# Patient Record
Sex: MALE | Race: WHITE | NOT HISPANIC OR LATINO | URBAN - METROPOLITAN AREA
[De-identification: names, ages, dates, MRNs, and addresses within clinical notes are randomized per-mention and may not be internally consistent; named-entity substitution may affect disease eponyms.]

---

## 2017-02-28 PROBLEM — Z86.718 HISTORY OF DEEP VENOUS THROMBOSIS: Status: RESOLVED | Noted: 2017-02-28 | Resolved: 2017-02-28

## 2017-02-28 PROBLEM — Z86.711 HISTORY OF PULMONARY EMBOLISM: Status: RESOLVED | Noted: 2017-02-28 | Resolved: 2017-02-28

## 2017-02-28 PROBLEM — I63.9 CVA (CEREBRAL INFARCTION): Status: RESOLVED | Noted: 2017-02-28 | Resolved: 2017-02-28

## 2017-02-28 PROBLEM — Z87.891 FORMER SMOKER: Status: ACTIVE | Noted: 2017-02-28

## 2017-02-28 PROBLEM — Z78.9 ALCOHOL INGESTION: Status: ACTIVE | Noted: 2017-02-28

## 2017-02-28 PROBLEM — Z86.79 HISTORY OF CONGESTIVE HEART FAILURE: Status: RESOLVED | Noted: 2017-02-28 | Resolved: 2017-02-28

## 2017-02-28 PROBLEM — Z87.09 HISTORY OF PLEURAL EFFUSION: Status: RESOLVED | Noted: 2017-02-28 | Resolved: 2017-02-28

## 2017-02-28 PROBLEM — E11.9 DIABETES MELLITUS, TYPE 2: Status: ACTIVE | Noted: 2017-02-28

## 2017-02-28 PROBLEM — C90.00 MULTIPLE MYELOMA: Status: ACTIVE | Noted: 2017-02-28

## 2017-02-28 PROBLEM — Z82.49 FAMILY HISTORY OF CAD (CORONARY ARTERY DISEASE): Status: ACTIVE | Noted: 2017-02-28

## 2017-02-28 PROBLEM — E78.5 DYSLIPIDEMIA: Status: ACTIVE | Noted: 2017-02-28

## 2017-02-28 PROBLEM — R05 CHRONIC COUGH: Status: RESOLVED | Noted: 2017-02-28 | Resolved: 2017-02-28

## 2017-02-28 PROBLEM — F41.9 ANXIETY: Status: ACTIVE | Noted: 2017-02-28

## 2017-02-28 PROBLEM — Z87.09 HISTORY OF CHRONIC OBSTRUCTIVE LUNG DISEASE: Status: RESOLVED | Noted: 2017-02-28 | Resolved: 2017-02-28

## 2017-02-28 PROBLEM — I34.0 MODERATE MITRAL REGURGITATION: Status: ACTIVE | Noted: 2017-02-28

## 2017-02-28 RX ORDER — GLIMEPIRIDE 1 MG/1
1 TABLET ORAL DAILY
Refills: 0 | Status: ACTIVE | COMMUNITY

## 2017-03-02 ENCOUNTER — INPATIENT (INPATIENT)
Facility: HOSPITAL | Age: 67
LOS: 17 days | Discharge: HOME | DRG: 266 | End: 2017-03-20
Attending: THORACIC SURGERY (CARDIOTHORACIC VASCULAR SURGERY) | Admitting: THORACIC SURGERY (CARDIOTHORACIC VASCULAR SURGERY)
Payer: MEDICARE

## 2017-03-02 ENCOUNTER — APPOINTMENT (OUTPATIENT)
Dept: CARDIOTHORACIC SURGERY | Facility: CLINIC | Age: 67
End: 2017-03-02

## 2017-03-02 VITALS — HEART RATE: 85 BPM | RESPIRATION RATE: 27 BRPM | OXYGEN SATURATION: 100 %

## 2017-03-02 VITALS
HEIGHT: 75 IN | HEART RATE: 88 BPM | TEMPERATURE: 98.8 F | OXYGEN SATURATION: 91 % | BODY MASS INDEX: 25.61 KG/M2 | SYSTOLIC BLOOD PRESSURE: 160 MMHG | DIASTOLIC BLOOD PRESSURE: 66 MMHG | WEIGHT: 206 LBS | RESPIRATION RATE: 26 BRPM

## 2017-03-02 VITALS — SYSTOLIC BLOOD PRESSURE: 146 MMHG | DIASTOLIC BLOOD PRESSURE: 61 MMHG

## 2017-03-02 DIAGNOSIS — Z85.038 PERSONAL HISTORY OF OTHER MALIGNANT NEOPLASM OF LARGE INTESTINE: ICD-10-CM

## 2017-03-02 DIAGNOSIS — I63.9 CEREBRAL INFARCTION, UNSPECIFIED: ICD-10-CM

## 2017-03-02 DIAGNOSIS — Z86.79 PERSONAL HISTORY OF OTHER DISEASES OF THE CIRCULATORY SYSTEM: ICD-10-CM

## 2017-03-02 DIAGNOSIS — Z86.711 PERSONAL HISTORY OF PULMONARY EMBOLISM: ICD-10-CM

## 2017-03-02 DIAGNOSIS — E78.5 HYPERLIPIDEMIA, UNSPECIFIED: ICD-10-CM

## 2017-03-02 DIAGNOSIS — C90.00 MULTIPLE MYELOMA NOT HAVING ACHIEVED REMISSION: ICD-10-CM

## 2017-03-02 DIAGNOSIS — E11.9 TYPE 2 DIABETES MELLITUS W/OUT COMPLICATIONS: ICD-10-CM

## 2017-03-02 DIAGNOSIS — R06.02 SHORTNESS OF BREATH: ICD-10-CM

## 2017-03-02 DIAGNOSIS — Z87.09 PERSONAL HISTORY OF OTHER DISEASES OF THE RESPIRATORY SYSTEM: ICD-10-CM

## 2017-03-02 DIAGNOSIS — Z87.891 PERSONAL HISTORY OF NICOTINE DEPENDENCE: ICD-10-CM

## 2017-03-02 DIAGNOSIS — I35.1 NONRHEUMATIC AORTIC (VALVE) INSUFFICIENCY: ICD-10-CM

## 2017-03-02 DIAGNOSIS — Z90.49 ACQUIRED ABSENCE OF OTHER SPECIFIED PARTS OF DIGESTIVE TRACT: Chronic | ICD-10-CM

## 2017-03-02 DIAGNOSIS — Z95.1 PRESENCE OF AORTOCORONARY BYPASS GRAFT: Chronic | ICD-10-CM

## 2017-03-02 DIAGNOSIS — Z78.9 OTHER SPECIFIED HEALTH STATUS: ICD-10-CM

## 2017-03-02 DIAGNOSIS — Z95.2 PRESENCE OF PROSTHETIC HEART VALVE: Chronic | ICD-10-CM

## 2017-03-02 DIAGNOSIS — R05 COUGH: ICD-10-CM

## 2017-03-02 DIAGNOSIS — F41.9 ANXIETY DISORDER, UNSPECIFIED: ICD-10-CM

## 2017-03-02 DIAGNOSIS — Z99.81 DEPENDENCE ON SUPPLEMENTAL OXYGEN: ICD-10-CM

## 2017-03-02 DIAGNOSIS — I34.0 NONRHEUMATIC MITRAL (VALVE) INSUFFICIENCY: ICD-10-CM

## 2017-03-02 DIAGNOSIS — Z82.49 FAMILY HISTORY OF ISCHEMIC HEART DISEASE AND OTHER DISEASES OF THE CIRCULATORY SYSTEM: ICD-10-CM

## 2017-03-02 DIAGNOSIS — Z86.718 PERSONAL HISTORY OF OTHER VENOUS THROMBOSIS AND EMBOLISM: ICD-10-CM

## 2017-03-02 LAB
ALBUMIN SERPL ELPH-MCNC: 3.4 G/DL — SIGNIFICANT CHANGE UP (ref 3.3–5)
ALP SERPL-CCNC: 75 U/L — SIGNIFICANT CHANGE UP (ref 40–120)
ALT FLD-CCNC: 20 U/L RC — SIGNIFICANT CHANGE UP (ref 10–45)
ANION GAP SERPL CALC-SCNC: 12 MMOL/L — SIGNIFICANT CHANGE UP (ref 5–17)
APPEARANCE UR: CLEAR — SIGNIFICANT CHANGE UP
APTT BLD: 46 SEC — HIGH (ref 27.5–37.4)
APTT BLD: 49.7 SEC — HIGH (ref 27.5–37.4)
AST SERPL-CCNC: 20 U/L — SIGNIFICANT CHANGE UP (ref 10–40)
BASOPHILS # BLD AUTO: 0 K/UL — SIGNIFICANT CHANGE UP (ref 0–0.2)
BASOPHILS NFR BLD AUTO: 0 % — SIGNIFICANT CHANGE UP (ref 0–2)
BILIRUB SERPL-MCNC: 0.6 MG/DL — SIGNIFICANT CHANGE UP (ref 0.2–1.2)
BILIRUB UR-MCNC: NEGATIVE — SIGNIFICANT CHANGE UP
BLD GP AB SCN SERPL QL: NEGATIVE — SIGNIFICANT CHANGE UP
BUN SERPL-MCNC: 12 MG/DL — SIGNIFICANT CHANGE UP (ref 7–23)
CALCIUM SERPL-MCNC: 8.5 MG/DL — SIGNIFICANT CHANGE UP (ref 8.4–10.5)
CHLORIDE SERPL-SCNC: 96 MMOL/L — SIGNIFICANT CHANGE UP (ref 96–108)
CK MB CFR SERPL CALC: 1.5 NG/ML — SIGNIFICANT CHANGE UP (ref 0–6.7)
CK SERPL-CCNC: 21 U/L — LOW (ref 30–200)
CO2 SERPL-SCNC: 30 MMOL/L — SIGNIFICANT CHANGE UP (ref 22–31)
COLOR SPEC: YELLOW — SIGNIFICANT CHANGE UP
CREAT SERPL-MCNC: 0.56 MG/DL — SIGNIFICANT CHANGE UP (ref 0.5–1.3)
DIFF PNL FLD: NEGATIVE — SIGNIFICANT CHANGE UP
EOSINOPHIL # BLD AUTO: 0.1 K/UL — SIGNIFICANT CHANGE UP (ref 0–0.5)
EOSINOPHIL NFR BLD AUTO: 0.7 % — SIGNIFICANT CHANGE UP (ref 0–6)
GAS PNL BLDA: SIGNIFICANT CHANGE UP
GLUCOSE SERPL-MCNC: 180 MG/DL — HIGH (ref 70–99)
GLUCOSE UR QL: 300
HCT VFR BLD CALC: 33.3 % — LOW (ref 39–50)
HGB BLD-MCNC: 11.2 G/DL — LOW (ref 13–17)
INR BLD: 1.98 RATIO — HIGH (ref 0.88–1.16)
KETONES UR-MCNC: NEGATIVE — SIGNIFICANT CHANGE UP
LEUKOCYTE ESTERASE UR-ACNC: NEGATIVE — SIGNIFICANT CHANGE UP
LYMPHOCYTES # BLD AUTO: 0.5 K/UL — LOW (ref 1–3.3)
LYMPHOCYTES # BLD AUTO: 6.7 % — LOW (ref 13–44)
MCHC RBC-ENTMCNC: 29.9 PG — SIGNIFICANT CHANGE UP (ref 27–34)
MCHC RBC-ENTMCNC: 33.6 GM/DL — SIGNIFICANT CHANGE UP (ref 32–36)
MCV RBC AUTO: 89 FL — SIGNIFICANT CHANGE UP (ref 80–100)
MONOCYTES # BLD AUTO: 1.1 K/UL — HIGH (ref 0–0.9)
MONOCYTES NFR BLD AUTO: 14.1 % — HIGH (ref 2–14)
NEUTROPHILS # BLD AUTO: 5.9 K/UL — SIGNIFICANT CHANGE UP (ref 1.8–7.4)
NEUTROPHILS NFR BLD AUTO: 78.5 % — HIGH (ref 43–77)
NITRITE UR-MCNC: NEGATIVE — SIGNIFICANT CHANGE UP
NT-PROBNP SERPL-SCNC: 4351 PG/ML — HIGH (ref 0–300)
PH UR: 6 — SIGNIFICANT CHANGE UP (ref 4.8–8)
PLATELET # BLD AUTO: 92 K/UL — LOW (ref 150–400)
POTASSIUM SERPL-MCNC: 4.4 MMOL/L — SIGNIFICANT CHANGE UP (ref 3.5–5.3)
POTASSIUM SERPL-SCNC: 4.4 MMOL/L — SIGNIFICANT CHANGE UP (ref 3.5–5.3)
PROCALCITONIN SERPL-MCNC: <0.05 NG/ML — SIGNIFICANT CHANGE UP (ref 0–0.05)
PROT SERPL-MCNC: 6.3 G/DL — SIGNIFICANT CHANGE UP (ref 6–8.3)
PROT UR-MCNC: SIGNIFICANT CHANGE UP
PROTHROM AB SERPL-ACNC: 21.7 SEC — HIGH (ref 10–13.1)
RBC # BLD: 3.74 M/UL — LOW (ref 4.2–5.8)
RBC # FLD: 16.5 % — HIGH (ref 10.3–14.5)
RH IG SCN BLD-IMP: NEGATIVE — SIGNIFICANT CHANGE UP
RH IG SCN BLD-IMP: NEGATIVE — SIGNIFICANT CHANGE UP
SODIUM SERPL-SCNC: 138 MMOL/L — SIGNIFICANT CHANGE UP (ref 135–145)
SP GR SPEC: >1.03 — HIGH (ref 1.01–1.02)
TROPONIN T SERPL-MCNC: <0.01 NG/ML — SIGNIFICANT CHANGE UP (ref 0–0.06)
UROBILINOGEN FLD QL: NEGATIVE — SIGNIFICANT CHANGE UP
WBC # BLD: 7.5 K/UL — SIGNIFICANT CHANGE UP (ref 3.8–10.5)
WBC # FLD AUTO: 7.5 K/UL — SIGNIFICANT CHANGE UP (ref 3.8–10.5)

## 2017-03-02 PROCEDURE — 71010: CPT | Mod: 26,76

## 2017-03-02 PROCEDURE — 36556 INSERT NON-TUNNEL CV CATH: CPT

## 2017-03-02 PROCEDURE — 93010 ELECTROCARDIOGRAM REPORT: CPT

## 2017-03-02 PROCEDURE — 93306 TTE W/DOPPLER COMPLETE: CPT | Mod: 26

## 2017-03-02 PROCEDURE — 71275 CT ANGIOGRAPHY CHEST: CPT | Mod: 26

## 2017-03-02 RX ORDER — VECURONIUM BROMIDE 20 MG/1
5 INJECTION, POWDER, FOR SOLUTION INTRAVENOUS ONCE
Qty: 0 | Refills: 0 | Status: COMPLETED | OUTPATIENT
Start: 2017-03-02 | End: 2017-03-02

## 2017-03-02 RX ORDER — HYDRALAZINE HCL 50 MG
10 TABLET ORAL ONCE
Qty: 0 | Refills: 0 | Status: COMPLETED | OUTPATIENT
Start: 2017-03-02 | End: 2017-03-02

## 2017-03-02 RX ORDER — CELECOXIB 200 MG/1
200 CAPSULE ORAL
Refills: 0 | Status: DISCONTINUED | COMMUNITY
End: 2017-03-02

## 2017-03-02 RX ORDER — POMALIDOMIDE 4 MG/1
CAPSULE ORAL
Refills: 0 | Status: DISCONTINUED | COMMUNITY
End: 2017-03-02

## 2017-03-02 RX ORDER — NICARDIPINE HYDROCHLORIDE 30 MG/1
3 CAPSULE, EXTENDED RELEASE ORAL
Qty: 50 | Refills: 0 | Status: DISCONTINUED | OUTPATIENT
Start: 2017-03-02 | End: 2017-03-06

## 2017-03-02 RX ORDER — BUDESONIDE, MICRONIZED 100 %
0.5 POWDER (GRAM) MISCELLANEOUS EVERY 12 HOURS
Qty: 0 | Refills: 0 | Status: DISCONTINUED | OUTPATIENT
Start: 2017-03-02 | End: 2017-03-06

## 2017-03-02 RX ORDER — DEXAMETHASONE 4 MG/1
4 TABLET ORAL
Refills: 0 | Status: ACTIVE | COMMUNITY

## 2017-03-02 RX ORDER — SITAGLIPTIN AND METFORMIN HYDROCHLORIDE 50; 1000 MG/1; MG/1
TABLET, FILM COATED ORAL
Refills: 0 | Status: DISCONTINUED | COMMUNITY
End: 2017-03-02

## 2017-03-02 RX ORDER — SITAGLIPTIN AND METFORMIN HYDROCHLORIDE 100; 1000 MG/1; MG/1
100-1000 TABLET, FILM COATED, EXTENDED RELEASE ORAL DAILY
Refills: 0 | Status: ACTIVE | COMMUNITY

## 2017-03-02 RX ORDER — PROPOFOL 10 MG/ML
8.92 INJECTION, EMULSION INTRAVENOUS
Qty: 500 | Refills: 0 | Status: DISCONTINUED | OUTPATIENT
Start: 2017-03-02 | End: 2017-03-05

## 2017-03-02 RX ORDER — AZITHROMYCIN 500 MG/1
500 TABLET, FILM COATED ORAL EVERY 24 HOURS
Qty: 0 | Refills: 0 | Status: DISCONTINUED | OUTPATIENT
Start: 2017-03-02 | End: 2017-03-03

## 2017-03-02 RX ORDER — IPRATROPIUM/ALBUTEROL SULFATE 18-103MCG
3 AEROSOL WITH ADAPTER (GRAM) INHALATION EVERY 4 HOURS
Qty: 0 | Refills: 0 | Status: DISCONTINUED | OUTPATIENT
Start: 2017-03-02 | End: 2017-03-06

## 2017-03-02 RX ORDER — FENTANYL CITRATE 50 UG/ML
100 INJECTION INTRAVENOUS ONCE
Qty: 0 | Refills: 0 | Status: DISCONTINUED | OUTPATIENT
Start: 2017-03-02 | End: 2017-03-02

## 2017-03-02 RX ORDER — PANTOPRAZOLE SODIUM 20 MG/1
40 TABLET, DELAYED RELEASE ORAL DAILY
Qty: 0 | Refills: 0 | Status: DISCONTINUED | OUTPATIENT
Start: 2017-03-02 | End: 2017-03-02

## 2017-03-02 RX ORDER — PANTOPRAZOLE SODIUM 20 MG/1
40 TABLET, DELAYED RELEASE ORAL EVERY 24 HOURS
Qty: 0 | Refills: 0 | Status: DISCONTINUED | OUTPATIENT
Start: 2017-03-02 | End: 2017-03-06

## 2017-03-02 RX ORDER — IPRATROPIUM/ALBUTEROL SULFATE 18-103MCG
3 AEROSOL WITH ADAPTER (GRAM) INHALATION EVERY 6 HOURS
Qty: 0 | Refills: 0 | Status: DISCONTINUED | OUTPATIENT
Start: 2017-03-02 | End: 2017-03-02

## 2017-03-02 RX ORDER — PHENYLEPHRINE HYDROCHLORIDE 10 MG/ML
0.29 INJECTION INTRAVENOUS
Qty: 80 | Refills: 0 | Status: DISCONTINUED | OUTPATIENT
Start: 2017-03-02 | End: 2017-03-05

## 2017-03-02 RX ORDER — SUCCINYLCHOLINE CHLORIDE 100 MG/5ML
5 SYRINGE (ML) INTRAVENOUS ONCE
Qty: 0 | Refills: 0 | Status: COMPLETED | OUTPATIENT
Start: 2017-03-02 | End: 2017-03-02

## 2017-03-02 RX ORDER — HEPARIN SODIUM 5000 [USP'U]/ML
1200 INJECTION INTRAVENOUS; SUBCUTANEOUS
Qty: 25000 | Refills: 0 | Status: DISCONTINUED | OUTPATIENT
Start: 2017-03-02 | End: 2017-03-03

## 2017-03-02 RX ORDER — CEFTRIAXONE 500 MG/1
1 INJECTION, POWDER, FOR SOLUTION INTRAMUSCULAR; INTRAVENOUS EVERY 24 HOURS
Qty: 0 | Refills: 0 | Status: DISCONTINUED | OUTPATIENT
Start: 2017-03-02 | End: 2017-03-03

## 2017-03-02 RX ORDER — FUROSEMIDE 40 MG
40 TABLET ORAL ONCE
Qty: 0 | Refills: 0 | Status: COMPLETED | OUTPATIENT
Start: 2017-03-02 | End: 2017-03-02

## 2017-03-02 RX ORDER — BENZONATATE 200 MG
200 CAPSULE ORAL
Refills: 0 | Status: DISCONTINUED | COMMUNITY
End: 2017-03-02

## 2017-03-02 RX ORDER — FENTANYL CITRATE 50 UG/ML
50 INJECTION INTRAVENOUS ONCE
Qty: 0 | Refills: 0 | Status: DISCONTINUED | OUTPATIENT
Start: 2017-03-02 | End: 2017-03-02

## 2017-03-02 RX ORDER — HEPARIN SODIUM 5000 [USP'U]/ML
120000 INJECTION INTRAVENOUS; SUBCUTANEOUS
Qty: 25000 | Refills: 0 | Status: DISCONTINUED | OUTPATIENT
Start: 2017-03-02 | End: 2017-03-02

## 2017-03-02 RX ORDER — TIOTROPIUM BROMIDE 18 UG/1
1 CAPSULE ORAL; RESPIRATORY (INHALATION) DAILY
Qty: 0 | Refills: 0 | Status: DISCONTINUED | OUTPATIENT
Start: 2017-03-02 | End: 2017-03-02

## 2017-03-02 RX ORDER — SODIUM CHLORIDE 9 MG/ML
3 INJECTION INTRAMUSCULAR; INTRAVENOUS; SUBCUTANEOUS EVERY 8 HOURS
Qty: 0 | Refills: 0 | Status: DISCONTINUED | OUTPATIENT
Start: 2017-03-02 | End: 2017-03-15

## 2017-03-02 RX ORDER — WARFARIN SODIUM 6 MG/1
TABLET ORAL DAILY
Refills: 0 | Status: DISCONTINUED | COMMUNITY
End: 2017-03-02

## 2017-03-02 RX ORDER — INSULIN HUMAN 100 [IU]/ML
6 INJECTION, SOLUTION SUBCUTANEOUS
Qty: 100 | Refills: 0 | Status: DISCONTINUED | OUTPATIENT
Start: 2017-03-02 | End: 2017-03-03

## 2017-03-02 RX ORDER — ASPIRIN ENTERIC COATED TABLETS 81 MG 81 MG/1
81 TABLET, DELAYED RELEASE ORAL
Refills: 0 | Status: ACTIVE | COMMUNITY

## 2017-03-02 RX ORDER — ALBUTEROL 90 UG/1
1 AEROSOL, METERED ORAL EVERY 4 HOURS
Qty: 0 | Refills: 0 | Status: DISCONTINUED | OUTPATIENT
Start: 2017-03-02 | End: 2017-03-02

## 2017-03-02 RX ORDER — PREGABALIN 200 MG/1
200 CAPSULE ORAL TWICE DAILY
Refills: 0 | Status: ACTIVE | COMMUNITY

## 2017-03-02 RX ORDER — PROPOFOL 10 MG/ML
10 INJECTION, EMULSION INTRAVENOUS ONCE
Qty: 0 | Refills: 0 | Status: COMPLETED | OUTPATIENT
Start: 2017-03-02 | End: 2017-03-02

## 2017-03-02 RX ORDER — ETOMIDATE 2 MG/ML
10 INJECTION INTRAVENOUS ONCE
Qty: 0 | Refills: 0 | Status: COMPLETED | OUTPATIENT
Start: 2017-03-02 | End: 2017-03-02

## 2017-03-02 RX ORDER — POMALIDOMIDE 4 MG/1
4 CAPSULE ORAL
Refills: 0 | Status: ACTIVE | COMMUNITY

## 2017-03-02 RX ADMIN — FENTANYL CITRATE 100 MICROGRAM(S): 50 INJECTION INTRAVENOUS at 16:30

## 2017-03-02 RX ADMIN — Medication 100 MILLIGRAM(S): at 16:00

## 2017-03-02 RX ADMIN — Medication 3 MILLILITER(S): at 23:11

## 2017-03-02 RX ADMIN — Medication 40 MILLIGRAM(S): at 16:10

## 2017-03-02 RX ADMIN — Medication 3 MILLILITER(S): at 17:09

## 2017-03-02 RX ADMIN — VECURONIUM BROMIDE 5 MILLIGRAM(S): 20 INJECTION, POWDER, FOR SOLUTION INTRAVENOUS at 22:22

## 2017-03-02 RX ADMIN — FENTANYL CITRATE 100 MICROGRAM(S): 50 INJECTION INTRAVENOUS at 16:45

## 2017-03-02 RX ADMIN — NICARDIPINE HYDROCHLORIDE 15 MG/HR: 30 CAPSULE, EXTENDED RELEASE ORAL at 18:50

## 2017-03-02 RX ADMIN — HEPARIN SODIUM 120000 UNIT(S)/HR: 5000 INJECTION INTRAVENOUS; SUBCUTANEOUS at 19:07

## 2017-03-02 RX ADMIN — CEFTRIAXONE 100 GRAM(S): 500 INJECTION, POWDER, FOR SOLUTION INTRAMUSCULAR; INTRAVENOUS at 21:38

## 2017-03-02 RX ADMIN — HEPARIN SODIUM 1200 UNIT(S)/HR: 5000 INJECTION INTRAVENOUS; SUBCUTANEOUS at 22:50

## 2017-03-02 RX ADMIN — PROPOFOL 10 MILLIGRAM(S): 10 INJECTION, EMULSION INTRAVENOUS at 16:40

## 2017-03-02 RX ADMIN — Medication 40 MILLIGRAM(S): at 16:40

## 2017-03-02 RX ADMIN — AZITHROMYCIN 250 MILLIGRAM(S): 500 TABLET, FILM COATED ORAL at 22:22

## 2017-03-02 RX ADMIN — PROPOFOL 5 MICROGRAM(S)/KG/MIN: 10 INJECTION, EMULSION INTRAVENOUS at 18:50

## 2017-03-02 RX ADMIN — FENTANYL CITRATE 50 MICROGRAM(S): 50 INJECTION INTRAVENOUS at 19:00

## 2017-03-02 RX ADMIN — SODIUM CHLORIDE 3 MILLILITER(S): 9 INJECTION INTRAMUSCULAR; INTRAVENOUS; SUBCUTANEOUS at 22:17

## 2017-03-02 RX ADMIN — PROPOFOL 5 MICROGRAM(S)/KG/MIN: 10 INJECTION, EMULSION INTRAVENOUS at 21:38

## 2017-03-02 RX ADMIN — Medication 10 MILLIGRAM(S): at 16:15

## 2017-03-02 RX ADMIN — FENTANYL CITRATE 50 MICROGRAM(S): 50 INJECTION INTRAVENOUS at 18:45

## 2017-03-02 RX ADMIN — Medication 5 MILLIGRAM(S): at 16:40

## 2017-03-02 RX ADMIN — INSULIN HUMAN 6 UNIT(S)/HR: 100 INJECTION, SOLUTION SUBCUTANEOUS at 22:50

## 2017-03-02 RX ADMIN — VECURONIUM BROMIDE 5 MILLIGRAM(S): 20 INJECTION, POWDER, FOR SOLUTION INTRAVENOUS at 16:40

## 2017-03-02 RX ADMIN — PANTOPRAZOLE SODIUM 40 MILLIGRAM(S): 20 TABLET, DELAYED RELEASE ORAL at 19:07

## 2017-03-02 NOTE — PATIENT PROFILE ADULT. - TEACHING/LEARNING LEARNING PREFERENCES
audio/verbal instruction/skill demonstration/group instruction/video/individual instruction/written material/computer/internet/pictorial

## 2017-03-02 NOTE — H&P ADULT. - HISTORY OF PRESENT ILLNESS
This patient is a 67 year old male with PMH of AI, CAD s/p AVR/CABG with Dr. Adame in 2011, multiple myeloma, colon cancer s/p resection, DVT and B/L PE 1/20/2017, DM2, HTN, HLD, MR, CVA, COPD who was admitted to the CTU from the cardiothoracic office at [facility] complaining of worsening SOB for 3 days. The patient admits to SOB/dyspnea with minimal activities with an associated cough that produces yellow sputum. He also admitted to PND - sleeping upright in chair, fever of 102 fahrenheit and generalized weakness. The patient denies CP, palpitations, N/V, lightheadedness.

## 2017-03-02 NOTE — H&P ADULT. - PROBLEM SELECTOR PLAN 1
1. intubated for severe respiratory distress  2. CTA chest with IV ctx to r/o PE  3. TTE, EKG, CXR, LABS, BCX, UA  4. Heparin for thrombus seen on TTE, discussed with Dr. Adame  5. Antibiotics: ceftriaxone, azithromycin for potential endocarditis  6. MONICA 3/3/17

## 2017-03-02 NOTE — H&P ADULT. - PMH
Anxiety disorder, unspecified type    Aortic valve insufficiency, unspecified etiology    Chronic obstructive pulmonary disease, unspecified COPD type    Congestive heart failure, unspecified congestive heart failure chronicity, unspecified congestive heart failure type    Coronary artery disease involving native coronary artery of native heart, angina presence unspecified    Deep vein thrombosis (DVT) of lower extremity, unspecified chronicity, unspecified laterality, unspecified vein    Essential hypertension    History of CVA (cerebrovascular accident)    Hyperlipidemia, unspecified hyperlipidemia type    Malignant neoplasm of ascending colon    Mitral valve insufficiency, unspecified etiology    Multiple myeloma, remission status unspecified    Other pulmonary embolism without acute cor pulmonale, unspecified chronicity    Type 2 diabetes mellitus without complication, with long-term current use of insulin

## 2017-03-02 NOTE — PATIENT PROFILE ADULT. - ABILITY TO HEAR (WITH HEARING AID OR HEARING APPLIANCE IF NORMALLY USED):
Mildly to Moderately Impaired: difficulty hearing in some environments or speaker may need to increase volume or speak distinctly/hearing aids

## 2017-03-03 LAB
ALBUMIN SERPL ELPH-MCNC: 3.4 G/DL — SIGNIFICANT CHANGE UP (ref 3.3–5)
ALP SERPL-CCNC: 72 U/L — SIGNIFICANT CHANGE UP (ref 40–120)
ALT FLD-CCNC: 20 U/L RC — SIGNIFICANT CHANGE UP (ref 10–45)
ANION GAP SERPL CALC-SCNC: 12 MMOL/L — SIGNIFICANT CHANGE UP (ref 5–17)
APTT BLD: 49.8 SEC — HIGH (ref 27.5–37.4)
APTT BLD: 55.9 SEC — HIGH (ref 27.5–37.4)
APTT BLD: 59.8 SEC — HIGH (ref 27.5–37.4)
AST SERPL-CCNC: 18 U/L — SIGNIFICANT CHANGE UP (ref 10–40)
BILIRUB SERPL-MCNC: 0.5 MG/DL — SIGNIFICANT CHANGE UP (ref 0.2–1.2)
BUN SERPL-MCNC: 18 MG/DL — SIGNIFICANT CHANGE UP (ref 7–23)
CALCIUM SERPL-MCNC: 8.6 MG/DL — SIGNIFICANT CHANGE UP (ref 8.4–10.5)
CHLORIDE SERPL-SCNC: 97 MMOL/L — SIGNIFICANT CHANGE UP (ref 96–108)
CK MB CFR SERPL CALC: 1.7 NG/ML — SIGNIFICANT CHANGE UP (ref 0–6.7)
CK MB CFR SERPL CALC: 1.8 NG/ML — SIGNIFICANT CHANGE UP (ref 0–6.7)
CK SERPL-CCNC: 17 U/L — LOW (ref 30–200)
CK SERPL-CCNC: 21 U/L — LOW (ref 30–200)
CO2 SERPL-SCNC: 29 MMOL/L — SIGNIFICANT CHANGE UP (ref 22–31)
CREAT SERPL-MCNC: 0.65 MG/DL — SIGNIFICANT CHANGE UP (ref 0.5–1.3)
FLUAV H1 2009 PAND RNA SPEC QL NAA+PROBE: DETECTED
GAS PNL BLDA: SIGNIFICANT CHANGE UP
GLUCOSE SERPL-MCNC: 167 MG/DL — HIGH (ref 70–99)
GRAM STN FLD: SIGNIFICANT CHANGE UP
GRAM STN FLD: SIGNIFICANT CHANGE UP
HCT VFR BLD CALC: 33.1 % — LOW (ref 39–50)
HGB BLD-MCNC: 10.9 G/DL — LOW (ref 13–17)
MCHC RBC-ENTMCNC: 29.4 PG — SIGNIFICANT CHANGE UP (ref 27–34)
MCHC RBC-ENTMCNC: 33 GM/DL — SIGNIFICANT CHANGE UP (ref 32–36)
MCV RBC AUTO: 89 FL — SIGNIFICANT CHANGE UP (ref 80–100)
MRSA PCR RESULT.: SIGNIFICANT CHANGE UP
PLATELET # BLD AUTO: 123 K/UL — LOW (ref 150–400)
POTASSIUM SERPL-MCNC: 3.9 MMOL/L — SIGNIFICANT CHANGE UP (ref 3.5–5.3)
POTASSIUM SERPL-SCNC: 3.9 MMOL/L — SIGNIFICANT CHANGE UP (ref 3.5–5.3)
PROT SERPL-MCNC: 6.2 G/DL — SIGNIFICANT CHANGE UP (ref 6–8.3)
RAPID RVP RESULT: DETECTED
RBC # BLD: 3.72 M/UL — LOW (ref 4.2–5.8)
RBC # FLD: 16.6 % — HIGH (ref 10.3–14.5)
S AUREUS DNA NOSE QL NAA+PROBE: SIGNIFICANT CHANGE UP
SODIUM SERPL-SCNC: 138 MMOL/L — SIGNIFICANT CHANGE UP (ref 135–145)
SPECIMEN SOURCE: SIGNIFICANT CHANGE UP
SPECIMEN SOURCE: SIGNIFICANT CHANGE UP
TROPONIN T SERPL-MCNC: <0.01 NG/ML — SIGNIFICANT CHANGE UP (ref 0–0.06)
TROPONIN T SERPL-MCNC: <0.01 NG/ML — SIGNIFICANT CHANGE UP (ref 0–0.06)
WBC # BLD: 8.9 K/UL — SIGNIFICANT CHANGE UP (ref 3.8–10.5)
WBC # FLD AUTO: 8.9 K/UL — SIGNIFICANT CHANGE UP (ref 3.8–10.5)

## 2017-03-03 PROCEDURE — 93325 DOPPLER ECHO COLOR FLOW MAPG: CPT | Mod: 26

## 2017-03-03 PROCEDURE — 71010: CPT | Mod: 26,77,76

## 2017-03-03 PROCEDURE — 71010: CPT | Mod: 26

## 2017-03-03 PROCEDURE — 99222 1ST HOSP IP/OBS MODERATE 55: CPT

## 2017-03-03 PROCEDURE — 93320 DOPPLER ECHO COMPLETE: CPT | Mod: 26

## 2017-03-03 PROCEDURE — 93312 ECHO TRANSESOPHAGEAL: CPT | Mod: 26

## 2017-03-03 RX ORDER — CHLORHEXIDINE GLUCONATE 213 G/1000ML
5 SOLUTION TOPICAL EVERY 4 HOURS
Qty: 0 | Refills: 0 | Status: DISCONTINUED | OUTPATIENT
Start: 2017-03-03 | End: 2017-03-06

## 2017-03-03 RX ORDER — METOCLOPRAMIDE HCL 10 MG
10 TABLET ORAL EVERY 8 HOURS
Qty: 0 | Refills: 0 | Status: COMPLETED | OUTPATIENT
Start: 2017-03-03 | End: 2017-03-03

## 2017-03-03 RX ORDER — ALBUMIN HUMAN 25 %
250 VIAL (ML) INTRAVENOUS ONCE
Qty: 0 | Refills: 0 | Status: COMPLETED | OUTPATIENT
Start: 2017-03-03 | End: 2017-03-03

## 2017-03-03 RX ORDER — VECURONIUM BROMIDE 20 MG/1
10 INJECTION, POWDER, FOR SOLUTION INTRAVENOUS ONCE
Qty: 0 | Refills: 0 | Status: COMPLETED | OUTPATIENT
Start: 2017-03-03 | End: 2017-03-03

## 2017-03-03 RX ORDER — DEXTROSE 50 % IN WATER 50 %
1 SYRINGE (ML) INTRAVENOUS ONCE
Qty: 0 | Refills: 0 | Status: DISCONTINUED | OUTPATIENT
Start: 2017-03-03 | End: 2017-03-12

## 2017-03-03 RX ORDER — FUROSEMIDE 40 MG
20 TABLET ORAL ONCE
Qty: 0 | Refills: 0 | Status: COMPLETED | OUTPATIENT
Start: 2017-03-03 | End: 2017-03-03

## 2017-03-03 RX ORDER — SODIUM CHLORIDE 9 MG/ML
1000 INJECTION, SOLUTION INTRAVENOUS
Qty: 0 | Refills: 0 | Status: DISCONTINUED | OUTPATIENT
Start: 2017-03-03 | End: 2017-03-12

## 2017-03-03 RX ORDER — HEPARIN SODIUM 5000 [USP'U]/ML
1500 INJECTION INTRAVENOUS; SUBCUTANEOUS
Qty: 25000 | Refills: 0 | Status: DISCONTINUED | OUTPATIENT
Start: 2017-03-03 | End: 2017-03-05

## 2017-03-03 RX ORDER — ONDANSETRON 8 MG/1
4 TABLET, FILM COATED ORAL ONCE
Qty: 0 | Refills: 0 | Status: COMPLETED | OUTPATIENT
Start: 2017-03-03 | End: 2017-03-03

## 2017-03-03 RX ORDER — CEFTRIAXONE 500 MG/1
1 INJECTION, POWDER, FOR SOLUTION INTRAMUSCULAR; INTRAVENOUS EVERY 24 HOURS
Qty: 0 | Refills: 0 | Status: DISCONTINUED | OUTPATIENT
Start: 2017-03-03 | End: 2017-03-03

## 2017-03-03 RX ORDER — INSULIN LISPRO 100/ML
VIAL (ML) SUBCUTANEOUS
Qty: 0 | Refills: 0 | Status: DISCONTINUED | OUTPATIENT
Start: 2017-03-03 | End: 2017-03-04

## 2017-03-03 RX ORDER — INSULIN LISPRO 100/ML
VIAL (ML) SUBCUTANEOUS EVERY 4 HOURS
Qty: 0 | Refills: 0 | Status: DISCONTINUED | OUTPATIENT
Start: 2017-03-03 | End: 2017-03-04

## 2017-03-03 RX ORDER — AZITHROMYCIN 500 MG/1
500 TABLET, FILM COATED ORAL EVERY 24 HOURS
Qty: 0 | Refills: 0 | Status: DISCONTINUED | OUTPATIENT
Start: 2017-03-03 | End: 2017-03-03

## 2017-03-03 RX ORDER — DEXTROSE 50 % IN WATER 50 %
12.5 SYRINGE (ML) INTRAVENOUS ONCE
Qty: 0 | Refills: 0 | Status: DISCONTINUED | OUTPATIENT
Start: 2017-03-03 | End: 2017-03-12

## 2017-03-03 RX ORDER — WARFARIN SODIUM 2.5 MG/1
5 TABLET ORAL ONCE
Qty: 0 | Refills: 0 | Status: COMPLETED | OUTPATIENT
Start: 2017-03-03 | End: 2017-03-03

## 2017-03-03 RX ORDER — DEXTROSE 50 % IN WATER 50 %
25 SYRINGE (ML) INTRAVENOUS ONCE
Qty: 0 | Refills: 0 | Status: DISCONTINUED | OUTPATIENT
Start: 2017-03-03 | End: 2017-03-12

## 2017-03-03 RX ORDER — DEXMEDETOMIDINE HYDROCHLORIDE IN 0.9% SODIUM CHLORIDE 4 UG/ML
0.5 INJECTION INTRAVENOUS
Qty: 200 | Refills: 0 | Status: DISCONTINUED | OUTPATIENT
Start: 2017-03-03 | End: 2017-03-07

## 2017-03-03 RX ORDER — POTASSIUM CHLORIDE 20 MEQ
10 PACKET (EA) ORAL
Qty: 0 | Refills: 0 | Status: COMPLETED | OUTPATIENT
Start: 2017-03-03 | End: 2017-03-03

## 2017-03-03 RX ORDER — GLUCAGON INJECTION, SOLUTION 0.5 MG/.1ML
1 INJECTION, SOLUTION SUBCUTANEOUS ONCE
Qty: 0 | Refills: 0 | Status: DISCONTINUED | OUTPATIENT
Start: 2017-03-03 | End: 2017-03-12

## 2017-03-03 RX ADMIN — Medication 3 MILLILITER(S): at 18:16

## 2017-03-03 RX ADMIN — VECURONIUM BROMIDE 10 MILLIGRAM(S): 20 INJECTION, POWDER, FOR SOLUTION INTRAVENOUS at 13:10

## 2017-03-03 RX ADMIN — Medication 0.5 MILLIGRAM(S): at 05:49

## 2017-03-03 RX ADMIN — WARFARIN SODIUM 5 MILLIGRAM(S): 2.5 TABLET ORAL at 22:20

## 2017-03-03 RX ADMIN — HEPARIN SODIUM 1600 UNIT(S)/HR: 5000 INJECTION INTRAVENOUS; SUBCUTANEOUS at 10:32

## 2017-03-03 RX ADMIN — Medication 40 MILLIGRAM(S): at 01:13

## 2017-03-03 RX ADMIN — Medication 75 MILLIGRAM(S): at 18:09

## 2017-03-03 RX ADMIN — PROPOFOL 5 MICROGRAM(S)/KG/MIN: 10 INJECTION, EMULSION INTRAVENOUS at 17:01

## 2017-03-03 RX ADMIN — Medication 3 MILLILITER(S): at 22:36

## 2017-03-03 RX ADMIN — HEPARIN SODIUM 1400 UNIT(S)/HR: 5000 INJECTION INTRAVENOUS; SUBCUTANEOUS at 03:29

## 2017-03-03 RX ADMIN — CHLORHEXIDINE GLUCONATE 5 MILLILITER(S): 213 SOLUTION TOPICAL at 14:26

## 2017-03-03 RX ADMIN — PANTOPRAZOLE SODIUM 40 MILLIGRAM(S): 20 TABLET, DELAYED RELEASE ORAL at 12:02

## 2017-03-03 RX ADMIN — SODIUM CHLORIDE 3 MILLILITER(S): 9 INJECTION INTRAMUSCULAR; INTRAVENOUS; SUBCUTANEOUS at 05:03

## 2017-03-03 RX ADMIN — Medication 10 MILLIGRAM(S): at 02:44

## 2017-03-03 RX ADMIN — CHLORHEXIDINE GLUCONATE 5 MILLILITER(S): 213 SOLUTION TOPICAL at 22:20

## 2017-03-03 RX ADMIN — CHLORHEXIDINE GLUCONATE 5 MILLILITER(S): 213 SOLUTION TOPICAL at 17:37

## 2017-03-03 RX ADMIN — PANTOPRAZOLE SODIUM 40 MILLIGRAM(S): 20 TABLET, DELAYED RELEASE ORAL at 06:11

## 2017-03-03 RX ADMIN — Medication 3 MILLILITER(S): at 05:38

## 2017-03-03 RX ADMIN — Medication 0.5 MILLIGRAM(S): at 18:16

## 2017-03-03 RX ADMIN — Medication 3 MILLILITER(S): at 11:04

## 2017-03-03 RX ADMIN — Medication 40 MILLIGRAM(S): at 17:37

## 2017-03-03 RX ADMIN — Medication 1: at 14:23

## 2017-03-03 RX ADMIN — SODIUM CHLORIDE 3 MILLILITER(S): 9 INJECTION INTRAMUSCULAR; INTRAVENOUS; SUBCUTANEOUS at 22:23

## 2017-03-03 RX ADMIN — Medication 10 MILLIGRAM(S): at 22:20

## 2017-03-03 RX ADMIN — DEXMEDETOMIDINE HYDROCHLORIDE IN 0.9% SODIUM CHLORIDE 11.68 MICROGRAM(S)/KG/HR: 4 INJECTION INTRAVENOUS at 17:01

## 2017-03-03 RX ADMIN — Medication 75 MILLIGRAM(S): at 02:45

## 2017-03-03 RX ADMIN — PHENYLEPHRINE HYDROCHLORIDE 10 MICROGRAM(S)/KG/MIN: 10 INJECTION INTRAVENOUS at 22:21

## 2017-03-03 RX ADMIN — Medication 3 MILLILITER(S): at 02:27

## 2017-03-03 RX ADMIN — Medication 2 MILLIGRAM(S): at 03:10

## 2017-03-03 RX ADMIN — CHLORHEXIDINE GLUCONATE 5 MILLILITER(S): 213 SOLUTION TOPICAL at 12:08

## 2017-03-03 RX ADMIN — ONDANSETRON 4 MILLIGRAM(S): 8 TABLET, FILM COATED ORAL at 02:44

## 2017-03-03 RX ADMIN — Medication 40 MILLIGRAM(S): at 12:02

## 2017-03-03 RX ADMIN — PHENYLEPHRINE HYDROCHLORIDE 10 MICROGRAM(S)/KG/MIN: 10 INJECTION INTRAVENOUS at 17:02

## 2017-03-03 RX ADMIN — Medication 2: at 22:20

## 2017-03-03 RX ADMIN — Medication 10 MILLIGRAM(S): at 14:23

## 2017-03-03 RX ADMIN — PROPOFOL 5 MICROGRAM(S)/KG/MIN: 10 INJECTION, EMULSION INTRAVENOUS at 22:21

## 2017-03-03 RX ADMIN — Medication 50 MILLIEQUIVALENT(S): at 03:30

## 2017-03-03 RX ADMIN — Medication 2: at 18:09

## 2017-03-03 RX ADMIN — Medication 125 MILLILITER(S): at 06:11

## 2017-03-03 RX ADMIN — Medication 50 MILLIEQUIVALENT(S): at 03:10

## 2017-03-03 RX ADMIN — Medication 3 MILLILITER(S): at 13:32

## 2017-03-03 RX ADMIN — SODIUM CHLORIDE 3 MILLILITER(S): 9 INJECTION INTRAMUSCULAR; INTRAVENOUS; SUBCUTANEOUS at 14:26

## 2017-03-03 RX ADMIN — Medication 20 MILLIGRAM(S): at 16:58

## 2017-03-03 NOTE — DIETITIAN INITIAL EVALUATION ADULT. - OTHER INFO
Limited nutrition and weight history at this time as pt unable to participate in interview and no family present at bedside. Per d/w team pt NPO for MONICA later today. No known food allergies documented in chart. Per H&P, micronutrient supplementation consisted of vitamin D3 and align. Nutrition consult received for HbA1c>7%, however, noted HbA1c results pending. Limited nutrition and weight history at this time as pt unable to participate in interview and no family present at bedside. Per d/w team pt NPO for MONICA later today. No known food allergies documented in chart. Per H&P, micronutrient supplementation consisted of vitamin D3 and align.

## 2017-03-03 NOTE — DIETITIAN INITIAL EVALUATION ADULT. - NS AS NUTRI INTERV ENTERAL NUTRITION
If PO intake remains contraindicated recommend initiating enteral nutrition support with Glucerna1.2 at 30ml/hr. Recommend increase by 10ml every 4 hours to goal rate 70ml/hr to provide 2016kcal and 101gm prot (~22kcal/kg and 1.1gm prot/kg adm wt 93.4kg).

## 2017-03-03 NOTE — DIETITIAN INITIAL EVALUATION ADULT. - PROBLEM SELECTOR PLAN 1
1. intubated for severe respiratory distress  2. CTA chest with IV ctx to r/o PE  3. TTE, EKG, CXR, LABS, BCX, UA  4. Heparin for thrombus seen on TTE, discussed with Dr. Aadme  5. Antibiotics: ceftriaxone, azithromycin for potential endocarditis  6. MONICA 3/3/17

## 2017-03-03 NOTE — DIETITIAN INITIAL EVALUATION ADULT. - ENERGY NEEDS
Ht:6ft3in, Wt:205.9pounds, BMI:25.8,   IBW:196pounds (+/-10%), 105%IBW  Pertinent Information: Pt adm with worsening SOB/respiratory distress as per chart. Per d/w team- echo suspicious for right atrial thrombus, possible COPD exacerbation, flu positive, plan for MONICA today.

## 2017-03-04 LAB
ALBUMIN SERPL ELPH-MCNC: 3.2 G/DL — LOW (ref 3.3–5)
ALP SERPL-CCNC: 67 U/L — SIGNIFICANT CHANGE UP (ref 40–120)
ALT FLD-CCNC: 18 U/L RC — SIGNIFICANT CHANGE UP (ref 10–45)
ANION GAP SERPL CALC-SCNC: 16 MMOL/L — SIGNIFICANT CHANGE UP (ref 5–17)
APTT BLD: 55.3 SEC — HIGH (ref 27.5–37.4)
APTT BLD: 55.8 SEC — HIGH (ref 27.5–37.4)
AST SERPL-CCNC: 16 U/L — SIGNIFICANT CHANGE UP (ref 10–40)
BILIRUB SERPL-MCNC: 0.6 MG/DL — SIGNIFICANT CHANGE UP (ref 0.2–1.2)
BUN SERPL-MCNC: 31 MG/DL — HIGH (ref 7–23)
CALCIUM SERPL-MCNC: 8.6 MG/DL — SIGNIFICANT CHANGE UP (ref 8.4–10.5)
CHLORIDE SERPL-SCNC: 98 MMOL/L — SIGNIFICANT CHANGE UP (ref 96–108)
CO2 SERPL-SCNC: 29 MMOL/L — SIGNIFICANT CHANGE UP (ref 22–31)
CREAT SERPL-MCNC: 0.72 MG/DL — SIGNIFICANT CHANGE UP (ref 0.5–1.3)
CULTURE RESULTS: SIGNIFICANT CHANGE UP
GAS PNL BLDA: SIGNIFICANT CHANGE UP
GLUCOSE SERPL-MCNC: 259 MG/DL — HIGH (ref 70–99)
HBA1C BLD-MCNC: 7.1 % — HIGH (ref 4–5.6)
HBA1C BLD-MCNC: 7.4 % — HIGH (ref 4–5.6)
HCT VFR BLD CALC: 31 % — LOW (ref 39–50)
HGB BLD-MCNC: 10.4 G/DL — LOW (ref 13–17)
IGA FLD-MCNC: 157 MG/DL — SIGNIFICANT CHANGE UP (ref 68–378)
IGG FLD-MCNC: 872 MG/DL — SIGNIFICANT CHANGE UP (ref 694–1618)
IGM SERPL-MCNC: 24 MG/DL — LOW (ref 40–230)
INR BLD: 1.58 RATIO — HIGH (ref 0.88–1.16)
KAPPA LC SER QL IFE: 2.57 MG/DL — HIGH (ref 0.33–1.94)
KAPPA LC SER QL IFE: 2.57 MG/DL — HIGH (ref 0.33–1.94)
KAPPA/LAMBDA FREE LIGHT CHAIN RATIO, SERUM: 2.38 RATIO — HIGH (ref 0.26–1.65)
KAPPA/LAMBDA FREE LIGHT CHAIN RATIO, SERUM: 2.38 RATIO — HIGH (ref 0.26–1.65)
LAMBDA LC SER QL IFE: 1.08 MG/DL — SIGNIFICANT CHANGE UP (ref 0.57–2.63)
LAMBDA LC SER QL IFE: 1.08 MG/DL — SIGNIFICANT CHANGE UP (ref 0.57–2.63)
MCHC RBC-ENTMCNC: 29.6 PG — SIGNIFICANT CHANGE UP (ref 27–34)
MCHC RBC-ENTMCNC: 33.7 GM/DL — SIGNIFICANT CHANGE UP (ref 32–36)
MCV RBC AUTO: 87.9 FL — SIGNIFICANT CHANGE UP (ref 80–100)
PLATELET # BLD AUTO: 110 K/UL — LOW (ref 150–400)
POTASSIUM SERPL-MCNC: 3.8 MMOL/L — SIGNIFICANT CHANGE UP (ref 3.5–5.3)
POTASSIUM SERPL-SCNC: 3.8 MMOL/L — SIGNIFICANT CHANGE UP (ref 3.5–5.3)
PROT SERPL-MCNC: 6.1 G/DL — SIGNIFICANT CHANGE UP (ref 6–8.3)
PROTHROM AB SERPL-ACNC: 17.3 SEC — HIGH (ref 10–13.1)
RBC # BLD: 3.52 M/UL — LOW (ref 4.2–5.8)
RBC # FLD: 16.9 % — HIGH (ref 10.3–14.5)
SODIUM SERPL-SCNC: 143 MMOL/L — SIGNIFICANT CHANGE UP (ref 135–145)
SPECIMEN SOURCE: SIGNIFICANT CHANGE UP
WBC # BLD: 8.1 K/UL — SIGNIFICANT CHANGE UP (ref 3.8–10.5)
WBC # FLD AUTO: 8.1 K/UL — SIGNIFICANT CHANGE UP (ref 3.8–10.5)

## 2017-03-04 PROCEDURE — 99291 CRITICAL CARE FIRST HOUR: CPT

## 2017-03-04 PROCEDURE — 71010: CPT | Mod: 26

## 2017-03-04 PROCEDURE — 99292 CRITICAL CARE ADDL 30 MIN: CPT

## 2017-03-04 RX ORDER — POTASSIUM CHLORIDE 20 MEQ
10 PACKET (EA) ORAL ONCE
Qty: 0 | Refills: 0 | Status: COMPLETED | OUTPATIENT
Start: 2017-03-04 | End: 2017-03-04

## 2017-03-04 RX ORDER — WARFARIN SODIUM 2.5 MG/1
5 TABLET ORAL ONCE
Qty: 0 | Refills: 0 | Status: COMPLETED | OUTPATIENT
Start: 2017-03-04 | End: 2017-03-04

## 2017-03-04 RX ORDER — VANCOMYCIN HCL 1 G
1000 VIAL (EA) INTRAVENOUS EVERY 12 HOURS
Qty: 0 | Refills: 0 | Status: DISCONTINUED | OUTPATIENT
Start: 2017-03-04 | End: 2017-03-06

## 2017-03-04 RX ORDER — VANCOMYCIN HCL 1 G
VIAL (EA) INTRAVENOUS
Qty: 0 | Refills: 0 | Status: DISCONTINUED | OUTPATIENT
Start: 2017-03-04 | End: 2017-03-06

## 2017-03-04 RX ORDER — ASPIRIN/CALCIUM CARB/MAGNESIUM 324 MG
325 TABLET ORAL DAILY
Qty: 0 | Refills: 0 | Status: DISCONTINUED | OUTPATIENT
Start: 2017-03-04 | End: 2017-03-09

## 2017-03-04 RX ORDER — INSULIN HUMAN 100 [IU]/ML
1 INJECTION, SOLUTION SUBCUTANEOUS
Qty: 100 | Refills: 0 | Status: DISCONTINUED | OUTPATIENT
Start: 2017-03-04 | End: 2017-03-07

## 2017-03-04 RX ORDER — POTASSIUM CHLORIDE 20 MEQ
10 PACKET (EA) ORAL ONCE
Qty: 0 | Refills: 0 | Status: DISCONTINUED | OUTPATIENT
Start: 2017-03-04 | End: 2017-03-04

## 2017-03-04 RX ORDER — INSULIN LISPRO 100/ML
VIAL (ML) SUBCUTANEOUS
Qty: 0 | Refills: 0 | Status: DISCONTINUED | OUTPATIENT
Start: 2017-03-04 | End: 2017-03-06

## 2017-03-04 RX ORDER — VANCOMYCIN HCL 1 G
1000 VIAL (EA) INTRAVENOUS ONCE
Qty: 0 | Refills: 0 | Status: COMPLETED | OUTPATIENT
Start: 2017-03-04 | End: 2017-03-04

## 2017-03-04 RX ADMIN — Medication 3 MILLILITER(S): at 14:50

## 2017-03-04 RX ADMIN — Medication 50 MILLIEQUIVALENT(S): at 08:36

## 2017-03-04 RX ADMIN — Medication 40 MILLIGRAM(S): at 11:54

## 2017-03-04 RX ADMIN — Medication 75 MILLIGRAM(S): at 06:14

## 2017-03-04 RX ADMIN — CHLORHEXIDINE GLUCONATE 5 MILLILITER(S): 213 SOLUTION TOPICAL at 11:53

## 2017-03-04 RX ADMIN — SODIUM CHLORIDE 3 MILLILITER(S): 9 INJECTION INTRAMUSCULAR; INTRAVENOUS; SUBCUTANEOUS at 22:48

## 2017-03-04 RX ADMIN — Medication 325 MILLIGRAM(S): at 11:54

## 2017-03-04 RX ADMIN — Medication 250 MILLIGRAM(S): at 01:53

## 2017-03-04 RX ADMIN — INSULIN HUMAN 1 UNIT(S)/HR: 100 INJECTION, SOLUTION SUBCUTANEOUS at 06:13

## 2017-03-04 RX ADMIN — CHLORHEXIDINE GLUCONATE 5 MILLILITER(S): 213 SOLUTION TOPICAL at 15:19

## 2017-03-04 RX ADMIN — PANTOPRAZOLE SODIUM 40 MILLIGRAM(S): 20 TABLET, DELAYED RELEASE ORAL at 11:54

## 2017-03-04 RX ADMIN — Medication 3 MILLILITER(S): at 06:11

## 2017-03-04 RX ADMIN — Medication 3 MILLILITER(S): at 22:24

## 2017-03-04 RX ADMIN — CHLORHEXIDINE GLUCONATE 5 MILLILITER(S): 213 SOLUTION TOPICAL at 22:52

## 2017-03-04 RX ADMIN — Medication 3 MILLILITER(S): at 01:18

## 2017-03-04 RX ADMIN — Medication 12: at 04:46

## 2017-03-04 RX ADMIN — Medication 3 MILLILITER(S): at 18:45

## 2017-03-04 RX ADMIN — SODIUM CHLORIDE 3 MILLILITER(S): 9 INJECTION INTRAMUSCULAR; INTRAVENOUS; SUBCUTANEOUS at 06:11

## 2017-03-04 RX ADMIN — Medication 40 MILLIGRAM(S): at 00:20

## 2017-03-04 RX ADMIN — Medication 4: at 02:38

## 2017-03-04 RX ADMIN — CHLORHEXIDINE GLUCONATE 5 MILLILITER(S): 213 SOLUTION TOPICAL at 18:09

## 2017-03-04 RX ADMIN — Medication 40 MILLIGRAM(S): at 18:09

## 2017-03-04 RX ADMIN — Medication 50 MILLIEQUIVALENT(S): at 02:41

## 2017-03-04 RX ADMIN — CHLORHEXIDINE GLUCONATE 5 MILLILITER(S): 213 SOLUTION TOPICAL at 05:27

## 2017-03-04 RX ADMIN — Medication 40 MILLIGRAM(S): at 05:27

## 2017-03-04 RX ADMIN — SODIUM CHLORIDE 3 MILLILITER(S): 9 INJECTION INTRAMUSCULAR; INTRAVENOUS; SUBCUTANEOUS at 15:18

## 2017-03-04 RX ADMIN — Medication 75 MILLIGRAM(S): at 18:07

## 2017-03-04 RX ADMIN — Medication 0.5 MILLIGRAM(S): at 18:45

## 2017-03-04 RX ADMIN — CHLORHEXIDINE GLUCONATE 5 MILLILITER(S): 213 SOLUTION TOPICAL at 01:54

## 2017-03-04 RX ADMIN — Medication 250 MILLIGRAM(S): at 18:08

## 2017-03-04 RX ADMIN — Medication 0.5 MILLIGRAM(S): at 06:11

## 2017-03-04 RX ADMIN — WARFARIN SODIUM 5 MILLIGRAM(S): 2.5 TABLET ORAL at 22:50

## 2017-03-04 RX ADMIN — Medication 3 MILLILITER(S): at 10:17

## 2017-03-04 NOTE — PROVIDER CONTACT NOTE (OTHER) - ACTION/TREATMENT ORDERED:
Glucose Checks changed to q 2. Correctional scale changed to higher scale for more coverage.  1x 10 mEq K+

## 2017-03-04 NOTE — PROVIDER CONTACT NOTE (OTHER) - BACKGROUND
3-2 admitted to CTU, intubated for respiratory distress. + for type A flu, awaiting TAVR for aortic insufficiency

## 2017-03-05 LAB
ALBUMIN SERPL ELPH-MCNC: 3.2 G/DL — LOW (ref 3.3–5)
ALP SERPL-CCNC: 60 U/L — SIGNIFICANT CHANGE UP (ref 40–120)
ALT FLD-CCNC: 13 U/L RC — SIGNIFICANT CHANGE UP (ref 10–45)
ANION GAP SERPL CALC-SCNC: 11 MMOL/L — SIGNIFICANT CHANGE UP (ref 5–17)
APTT BLD: 36.7 SEC — SIGNIFICANT CHANGE UP (ref 27.5–37.4)
APTT BLD: 50.8 SEC — HIGH (ref 27.5–37.4)
AST SERPL-CCNC: 13 U/L — SIGNIFICANT CHANGE UP (ref 10–40)
BILIRUB SERPL-MCNC: 0.4 MG/DL — SIGNIFICANT CHANGE UP (ref 0.2–1.2)
BUN SERPL-MCNC: 33 MG/DL — HIGH (ref 7–23)
CALCIUM SERPL-MCNC: 8.7 MG/DL — SIGNIFICANT CHANGE UP (ref 8.4–10.5)
CHLORIDE SERPL-SCNC: 100 MMOL/L — SIGNIFICANT CHANGE UP (ref 96–108)
CO2 SERPL-SCNC: 31 MMOL/L — SIGNIFICANT CHANGE UP (ref 22–31)
CREAT SERPL-MCNC: 0.65 MG/DL — SIGNIFICANT CHANGE UP (ref 0.5–1.3)
CULTURE RESULTS: SIGNIFICANT CHANGE UP
GAS PNL BLDA: SIGNIFICANT CHANGE UP
GLUCOSE SERPL-MCNC: 160 MG/DL — HIGH (ref 70–99)
HCT VFR BLD CALC: 30.9 % — LOW (ref 39–50)
HGB BLD-MCNC: 9.8 G/DL — LOW (ref 13–17)
INR BLD: 1.55 RATIO — HIGH (ref 0.88–1.16)
MCHC RBC-ENTMCNC: 28.4 PG — SIGNIFICANT CHANGE UP (ref 27–34)
MCHC RBC-ENTMCNC: 31.8 GM/DL — LOW (ref 32–36)
MCV RBC AUTO: 89.3 FL — SIGNIFICANT CHANGE UP (ref 80–100)
PLATELET # BLD AUTO: 70 K/UL — LOW (ref 150–400)
POTASSIUM SERPL-MCNC: 4.2 MMOL/L — SIGNIFICANT CHANGE UP (ref 3.5–5.3)
POTASSIUM SERPL-SCNC: 4.2 MMOL/L — SIGNIFICANT CHANGE UP (ref 3.5–5.3)
PROT SERPL-MCNC: 5.9 G/DL — LOW (ref 6–8.3)
PROTHROM AB SERPL-ACNC: 17 SEC — HIGH (ref 10–13.1)
RBC # BLD: 3.46 M/UL — LOW (ref 4.2–5.8)
RBC # FLD: 16.9 % — HIGH (ref 10.3–14.5)
SODIUM SERPL-SCNC: 142 MMOL/L — SIGNIFICANT CHANGE UP (ref 135–145)
SPECIMEN SOURCE: SIGNIFICANT CHANGE UP
WBC # BLD: 3.1 K/UL — LOW (ref 3.8–10.5)
WBC # FLD AUTO: 3.1 K/UL — LOW (ref 3.8–10.5)

## 2017-03-05 PROCEDURE — 99291 CRITICAL CARE FIRST HOUR: CPT

## 2017-03-05 PROCEDURE — 71010: CPT | Mod: 26

## 2017-03-05 RX ORDER — HEPARIN SODIUM 5000 [USP'U]/ML
1500 INJECTION INTRAVENOUS; SUBCUTANEOUS
Qty: 25000 | Refills: 0 | Status: DISCONTINUED | OUTPATIENT
Start: 2017-03-05 | End: 2017-03-05

## 2017-03-05 RX ORDER — ARGATROBAN 50 MG/50ML
1 INJECTION, SOLUTION INTRAVENOUS
Qty: 250 | Refills: 0 | Status: DISCONTINUED | OUTPATIENT
Start: 2017-03-05 | End: 2017-03-06

## 2017-03-05 RX ORDER — ALPRAZOLAM 0.25 MG
0.25 TABLET ORAL ONCE
Qty: 0 | Refills: 0 | Status: DISCONTINUED | OUTPATIENT
Start: 2017-03-05 | End: 2017-03-05

## 2017-03-05 RX ADMIN — Medication 3 MILLILITER(S): at 17:10

## 2017-03-05 RX ADMIN — ARGATROBAN 5.6 MICROGRAM(S)/KG/MIN: 50 INJECTION, SOLUTION INTRAVENOUS at 21:49

## 2017-03-05 RX ADMIN — SODIUM CHLORIDE 3 MILLILITER(S): 9 INJECTION INTRAMUSCULAR; INTRAVENOUS; SUBCUTANEOUS at 21:32

## 2017-03-05 RX ADMIN — SODIUM CHLORIDE 3 MILLILITER(S): 9 INJECTION INTRAMUSCULAR; INTRAVENOUS; SUBCUTANEOUS at 05:25

## 2017-03-05 RX ADMIN — Medication 75 MILLIGRAM(S): at 21:39

## 2017-03-05 RX ADMIN — CHLORHEXIDINE GLUCONATE 5 MILLILITER(S): 213 SOLUTION TOPICAL at 03:00

## 2017-03-05 RX ADMIN — CHLORHEXIDINE GLUCONATE 5 MILLILITER(S): 213 SOLUTION TOPICAL at 11:53

## 2017-03-05 RX ADMIN — Medication 0.25 MILLIGRAM(S): at 22:24

## 2017-03-05 RX ADMIN — Medication 3 MILLILITER(S): at 05:34

## 2017-03-05 RX ADMIN — PROPOFOL 5 MICROGRAM(S)/KG/MIN: 10 INJECTION, EMULSION INTRAVENOUS at 05:22

## 2017-03-05 RX ADMIN — Medication 3 MILLILITER(S): at 10:06

## 2017-03-05 RX ADMIN — SODIUM CHLORIDE 3 MILLILITER(S): 9 INJECTION INTRAMUSCULAR; INTRAVENOUS; SUBCUTANEOUS at 12:40

## 2017-03-05 RX ADMIN — Medication 0.5 MILLIGRAM(S): at 17:10

## 2017-03-05 RX ADMIN — Medication 3 MILLILITER(S): at 01:29

## 2017-03-05 RX ADMIN — Medication 100 MILLIGRAM(S): at 22:24

## 2017-03-05 RX ADMIN — Medication 20 MILLIGRAM(S): at 11:54

## 2017-03-05 RX ADMIN — PANTOPRAZOLE SODIUM 40 MILLIGRAM(S): 20 TABLET, DELAYED RELEASE ORAL at 11:53

## 2017-03-05 RX ADMIN — Medication 325 MILLIGRAM(S): at 16:22

## 2017-03-05 RX ADMIN — HEPARIN SODIUM 15 UNIT(S)/HR: 5000 INJECTION INTRAVENOUS; SUBCUTANEOUS at 05:22

## 2017-03-05 RX ADMIN — Medication 40 MILLIGRAM(S): at 01:18

## 2017-03-05 RX ADMIN — ARGATROBAN 2.8 MICROGRAM(S)/KG/MIN: 50 INJECTION, SOLUTION INTRAVENOUS at 13:18

## 2017-03-05 RX ADMIN — CHLORHEXIDINE GLUCONATE 5 MILLILITER(S): 213 SOLUTION TOPICAL at 05:22

## 2017-03-05 RX ADMIN — Medication 20 MILLIGRAM(S): at 19:01

## 2017-03-05 RX ADMIN — Medication 0.5 MILLIGRAM(S): at 05:34

## 2017-03-05 RX ADMIN — Medication 40 MILLIGRAM(S): at 05:22

## 2017-03-05 RX ADMIN — Medication 250 MILLIGRAM(S): at 05:23

## 2017-03-05 RX ADMIN — Medication 250 MILLIGRAM(S): at 19:00

## 2017-03-05 RX ADMIN — INSULIN HUMAN 1 UNIT(S)/HR: 100 INJECTION, SOLUTION SUBCUTANEOUS at 05:23

## 2017-03-05 RX ADMIN — Medication 75 MILLIGRAM(S): at 05:25

## 2017-03-05 RX ADMIN — Medication 3 MILLILITER(S): at 22:04

## 2017-03-05 NOTE — AIRWAY REMOVAL NOTE  ADULT & PEDS - ARTIFICAL AIRWAY REMOVAL COMMENTS
Written order for extubation verified. Pt was identified by full name and birthday compared to ID band.  Present during the procedure was Agatha SHOOK

## 2017-03-06 PROBLEM — Z99.81 O2 DEPENDENT: Status: ACTIVE | Noted: 2017-03-02

## 2017-03-06 PROBLEM — I35.1 MODERATE AORTIC INSUFFICIENCY: Status: ACTIVE | Noted: 2017-02-28

## 2017-03-06 LAB
ALBUMIN SERPL ELPH-MCNC: 3.1 G/DL — LOW (ref 3.3–5)
ALP SERPL-CCNC: 55 U/L — SIGNIFICANT CHANGE UP (ref 40–120)
ALT FLD-CCNC: 13 U/L RC — SIGNIFICANT CHANGE UP (ref 10–45)
ANION GAP SERPL CALC-SCNC: 8 MMOL/L — SIGNIFICANT CHANGE UP (ref 5–17)
APTT BLD: 42.1 SEC — HIGH (ref 27.5–37.4)
APTT BLD: 45.6 SEC — HIGH (ref 27.5–37.4)
APTT BLD: 59 SEC — HIGH (ref 27.5–37.4)
AST SERPL-CCNC: 11 U/L — SIGNIFICANT CHANGE UP (ref 10–40)
BILIRUB SERPL-MCNC: 0.6 MG/DL — SIGNIFICANT CHANGE UP (ref 0.2–1.2)
BUN SERPL-MCNC: 33 MG/DL — HIGH (ref 7–23)
CALCIUM SERPL-MCNC: 8.3 MG/DL — LOW (ref 8.4–10.5)
CHLORIDE SERPL-SCNC: 105 MMOL/L — SIGNIFICANT CHANGE UP (ref 96–108)
CO2 SERPL-SCNC: 32 MMOL/L — HIGH (ref 22–31)
CREAT SERPL-MCNC: 0.56 MG/DL — SIGNIFICANT CHANGE UP (ref 0.5–1.3)
GAS PNL BLDA: SIGNIFICANT CHANGE UP
GLUCOSE SERPL-MCNC: 151 MG/DL — HIGH (ref 70–99)
HCT VFR BLD CALC: 29.5 % — LOW (ref 39–50)
HGB BLD-MCNC: 9.6 G/DL — LOW (ref 13–17)
INR BLD: 1.88 RATIO — HIGH (ref 0.88–1.16)
INR BLD: 2.8 RATIO — HIGH (ref 0.88–1.16)
MCHC RBC-ENTMCNC: 29.1 PG — SIGNIFICANT CHANGE UP (ref 27–34)
MCHC RBC-ENTMCNC: 32.3 GM/DL — SIGNIFICANT CHANGE UP (ref 32–36)
MCV RBC AUTO: 89.8 FL — SIGNIFICANT CHANGE UP (ref 80–100)
PF4 HEPARIN CMPLX AB SER-ACNC: NEGATIVE — SIGNIFICANT CHANGE UP
PF4 HEPARIN CMPLX AB SERPL QL IA: 0.12 ABS — SIGNIFICANT CHANGE UP
PLATELET # BLD AUTO: 71 K/UL — LOW (ref 150–400)
POTASSIUM SERPL-MCNC: 4.7 MMOL/L — SIGNIFICANT CHANGE UP (ref 3.5–5.3)
POTASSIUM SERPL-SCNC: 4.7 MMOL/L — SIGNIFICANT CHANGE UP (ref 3.5–5.3)
PROT SERPL-MCNC: 5.5 G/DL — LOW (ref 6–8.3)
PROT SERPL-MCNC: 6.1 G/DL — SIGNIFICANT CHANGE UP (ref 6–8.3)
PROT SERPL-MCNC: 6.1 G/DL — SIGNIFICANT CHANGE UP (ref 6–8.3)
PROTHROM AB SERPL-ACNC: 20.6 SEC — HIGH (ref 10–13.1)
PROTHROM AB SERPL-ACNC: 30.8 SEC — HIGH (ref 10–13.1)
RBC # BLD: 3.29 M/UL — LOW (ref 4.2–5.8)
RBC # FLD: 16.4 % — HIGH (ref 10.3–14.5)
SODIUM SERPL-SCNC: 145 MMOL/L — SIGNIFICANT CHANGE UP (ref 135–145)
WBC # BLD: 5.5 K/UL — SIGNIFICANT CHANGE UP (ref 3.8–10.5)
WBC # FLD AUTO: 5.5 K/UL — SIGNIFICANT CHANGE UP (ref 3.8–10.5)

## 2017-03-06 PROCEDURE — 71010: CPT | Mod: 26

## 2017-03-06 RX ORDER — ALBUTEROL 90 UG/1
1 AEROSOL, METERED ORAL EVERY 4 HOURS
Qty: 0 | Refills: 0 | Status: DISCONTINUED | OUTPATIENT
Start: 2017-03-06 | End: 2017-03-07

## 2017-03-06 RX ORDER — IPRATROPIUM/ALBUTEROL SULFATE 18-103MCG
3 AEROSOL WITH ADAPTER (GRAM) INHALATION EVERY 6 HOURS
Qty: 0 | Refills: 0 | Status: DISCONTINUED | OUTPATIENT
Start: 2017-03-06 | End: 2017-03-07

## 2017-03-06 RX ORDER — HEPARIN SODIUM 5000 [USP'U]/ML
1200 INJECTION INTRAVENOUS; SUBCUTANEOUS
Qty: 25000 | Refills: 0 | Status: DISCONTINUED | OUTPATIENT
Start: 2017-03-06 | End: 2017-03-07

## 2017-03-06 RX ORDER — CARVEDILOL PHOSPHATE 80 MG/1
6.25 CAPSULE, EXTENDED RELEASE ORAL EVERY 12 HOURS
Qty: 0 | Refills: 0 | Status: DISCONTINUED | OUTPATIENT
Start: 2017-03-06 | End: 2017-03-15

## 2017-03-06 RX ORDER — ALPRAZOLAM 0.25 MG
0.25 TABLET ORAL ONCE
Qty: 0 | Refills: 0 | Status: DISCONTINUED | OUTPATIENT
Start: 2017-03-06 | End: 2017-03-07

## 2017-03-06 RX ORDER — WARFARIN SODIUM 2.5 MG/1
5 TABLET ORAL ONCE
Qty: 0 | Refills: 0 | Status: COMPLETED | OUTPATIENT
Start: 2017-03-06 | End: 2017-03-06

## 2017-03-06 RX ORDER — ARGATROBAN 50 MG/50ML
2 INJECTION, SOLUTION INTRAVENOUS
Qty: 250 | Refills: 0 | Status: DISCONTINUED | OUTPATIENT
Start: 2017-03-06 | End: 2017-03-06

## 2017-03-06 RX ORDER — PANTOPRAZOLE SODIUM 20 MG/1
40 TABLET, DELAYED RELEASE ORAL
Qty: 0 | Refills: 0 | Status: DISCONTINUED | OUTPATIENT
Start: 2017-03-06 | End: 2017-03-15

## 2017-03-06 RX ORDER — ARGATROBAN 50 MG/50ML
2.5 INJECTION, SOLUTION INTRAVENOUS
Qty: 250 | Refills: 0 | Status: DISCONTINUED | OUTPATIENT
Start: 2017-03-06 | End: 2017-03-06

## 2017-03-06 RX ADMIN — Medication 3 MILLILITER(S): at 02:26

## 2017-03-06 RX ADMIN — CARVEDILOL PHOSPHATE 6.25 MILLIGRAM(S): 80 CAPSULE, EXTENDED RELEASE ORAL at 17:27

## 2017-03-06 RX ADMIN — SODIUM CHLORIDE 3 MILLILITER(S): 9 INJECTION INTRAMUSCULAR; INTRAVENOUS; SUBCUTANEOUS at 21:52

## 2017-03-06 RX ADMIN — Medication 3 MILLILITER(S): at 17:58

## 2017-03-06 RX ADMIN — Medication 250 MILLIGRAM(S): at 05:34

## 2017-03-06 RX ADMIN — HEPARIN SODIUM 12 UNIT(S)/HR: 5000 INJECTION INTRAVENOUS; SUBCUTANEOUS at 17:00

## 2017-03-06 RX ADMIN — Medication 100 MILLIGRAM(S): at 05:34

## 2017-03-06 RX ADMIN — ARGATROBAN 11.21 MICROGRAM(S)/KG/MIN: 50 INJECTION, SOLUTION INTRAVENOUS at 03:30

## 2017-03-06 RX ADMIN — Medication 75 MILLIGRAM(S): at 17:28

## 2017-03-06 RX ADMIN — Medication 20 MILLIGRAM(S): at 01:05

## 2017-03-06 RX ADMIN — PANTOPRAZOLE SODIUM 40 MILLIGRAM(S): 20 TABLET, DELAYED RELEASE ORAL at 11:58

## 2017-03-06 RX ADMIN — SODIUM CHLORIDE 3 MILLILITER(S): 9 INJECTION INTRAMUSCULAR; INTRAVENOUS; SUBCUTANEOUS at 14:42

## 2017-03-06 RX ADMIN — Medication 3 MILLILITER(S): at 07:49

## 2017-03-06 RX ADMIN — Medication 325 MILLIGRAM(S): at 11:58

## 2017-03-06 RX ADMIN — Medication 20 MILLIGRAM(S): at 05:34

## 2017-03-06 RX ADMIN — CARVEDILOL PHOSPHATE 6.25 MILLIGRAM(S): 80 CAPSULE, EXTENDED RELEASE ORAL at 11:11

## 2017-03-06 RX ADMIN — SODIUM CHLORIDE 3 MILLILITER(S): 9 INJECTION INTRAMUSCULAR; INTRAVENOUS; SUBCUTANEOUS at 05:23

## 2017-03-06 RX ADMIN — Medication 0.5 MILLIGRAM(S): at 07:50

## 2017-03-06 RX ADMIN — ARGATROBAN 14.01 MICROGRAM(S)/KG/MIN: 50 INJECTION, SOLUTION INTRAVENOUS at 10:50

## 2017-03-06 RX ADMIN — Medication 75 MILLIGRAM(S): at 05:34

## 2017-03-07 LAB
% ALBUMIN: 50.2 % — SIGNIFICANT CHANGE UP
% ALPHA 1: 8 % — SIGNIFICANT CHANGE UP
% ALPHA 2: 12.9 % — SIGNIFICANT CHANGE UP
% BETA: 12.9 % — SIGNIFICANT CHANGE UP
% GAMMA: 16 % — SIGNIFICANT CHANGE UP
% M SPIKE: 7.1 % — SIGNIFICANT CHANGE UP
ALBUMIN SERPL ELPH-MCNC: 3 G/DL — LOW (ref 3.3–5)
ALBUMIN SERPL ELPH-MCNC: 3.1 G/DL — LOW (ref 3.6–5.5)
ALBUMIN/GLOB SERPL ELPH: 1 RATIO — SIGNIFICANT CHANGE UP
ALP SERPL-CCNC: 56 U/L — SIGNIFICANT CHANGE UP (ref 40–120)
ALPHA1 GLOB SERPL ELPH-MCNC: 0.5 G/DL — HIGH (ref 0.1–0.4)
ALPHA2 GLOB SERPL ELPH-MCNC: 0.8 G/DL — SIGNIFICANT CHANGE UP (ref 0.5–1)
ALT FLD-CCNC: 14 U/L RC — SIGNIFICANT CHANGE UP (ref 10–45)
ANION GAP SERPL CALC-SCNC: 10 MMOL/L — SIGNIFICANT CHANGE UP (ref 5–17)
APTT BLD: 39.3 SEC — HIGH (ref 27.5–37.4)
APTT BLD: 47.9 SEC — HIGH (ref 27.5–37.4)
APTT BLD: 54.2 SEC — HIGH (ref 27.5–37.4)
AST SERPL-CCNC: 17 U/L — SIGNIFICANT CHANGE UP (ref 10–40)
B-GLOBULIN SERPL ELPH-MCNC: 0.8 G/DL — SIGNIFICANT CHANGE UP (ref 0.5–1)
BILIRUB SERPL-MCNC: 0.6 MG/DL — SIGNIFICANT CHANGE UP (ref 0.2–1.2)
BUN SERPL-MCNC: 28 MG/DL — HIGH (ref 7–23)
CALCIUM SERPL-MCNC: 8.4 MG/DL — SIGNIFICANT CHANGE UP (ref 8.4–10.5)
CHLORIDE SERPL-SCNC: 101 MMOL/L — SIGNIFICANT CHANGE UP (ref 96–108)
CO2 SERPL-SCNC: 31 MMOL/L — SIGNIFICANT CHANGE UP (ref 22–31)
CREAT SERPL-MCNC: 0.59 MG/DL — SIGNIFICANT CHANGE UP (ref 0.5–1.3)
CULTURE RESULTS: SIGNIFICANT CHANGE UP
CULTURE RESULTS: SIGNIFICANT CHANGE UP
GAMMA GLOBULIN: 1 G/DL — SIGNIFICANT CHANGE UP (ref 0.6–1.6)
GAS PNL BLDA: SIGNIFICANT CHANGE UP
GLUCOSE SERPL-MCNC: 131 MG/DL — HIGH (ref 70–99)
HCT VFR BLD CALC: 30.4 % — LOW (ref 39–50)
HGB BLD-MCNC: 9.9 G/DL — LOW (ref 13–17)
INR BLD: 1.99 RATIO — HIGH (ref 0.88–1.16)
M-SPIKE: 0.4 G/DL — HIGH (ref 0–0)
MCHC RBC-ENTMCNC: 29.3 PG — SIGNIFICANT CHANGE UP (ref 27–34)
MCHC RBC-ENTMCNC: 32.6 GM/DL — SIGNIFICANT CHANGE UP (ref 32–36)
MCV RBC AUTO: 89.7 FL — SIGNIFICANT CHANGE UP (ref 80–100)
PLATELET # BLD AUTO: 80 K/UL — LOW (ref 150–400)
POTASSIUM SERPL-MCNC: 4.6 MMOL/L — SIGNIFICANT CHANGE UP (ref 3.5–5.3)
POTASSIUM SERPL-SCNC: 4.6 MMOL/L — SIGNIFICANT CHANGE UP (ref 3.5–5.3)
PROT PATTERN SERPL ELPH-IMP: SIGNIFICANT CHANGE UP
PROT SERPL-MCNC: 5.6 G/DL — LOW (ref 6–8.3)
PROTHROM AB SERPL-ACNC: 21.6 SEC — HIGH (ref 10–13.1)
RBC # BLD: 3.39 M/UL — LOW (ref 4.2–5.8)
RBC # FLD: 16.3 % — HIGH (ref 10.3–14.5)
SODIUM SERPL-SCNC: 142 MMOL/L — SIGNIFICANT CHANGE UP (ref 135–145)
SPECIMEN SOURCE: SIGNIFICANT CHANGE UP
SPECIMEN SOURCE: SIGNIFICANT CHANGE UP
WBC # BLD: 6.4 K/UL — SIGNIFICANT CHANGE UP (ref 3.8–10.5)
WBC # FLD AUTO: 6.4 K/UL — SIGNIFICANT CHANGE UP (ref 3.8–10.5)

## 2017-03-07 PROCEDURE — 71010: CPT | Mod: 26

## 2017-03-07 PROCEDURE — 99291 CRITICAL CARE FIRST HOUR: CPT

## 2017-03-07 RX ORDER — POTASSIUM CHLORIDE 20 MEQ
10 PACKET (EA) ORAL ONCE
Qty: 0 | Refills: 0 | Status: COMPLETED | OUTPATIENT
Start: 2017-03-07 | End: 2017-03-07

## 2017-03-07 RX ORDER — IPRATROPIUM BROMIDE 0.2 MG/ML
1 SOLUTION, NON-ORAL INHALATION EVERY 6 HOURS
Qty: 0 | Refills: 0 | Status: DISCONTINUED | OUTPATIENT
Start: 2017-03-07 | End: 2017-03-07

## 2017-03-07 RX ORDER — HEPARIN SODIUM 5000 [USP'U]/ML
3000 INJECTION INTRAVENOUS; SUBCUTANEOUS ONCE
Qty: 0 | Refills: 0 | Status: COMPLETED | OUTPATIENT
Start: 2017-03-07 | End: 2017-03-07

## 2017-03-07 RX ORDER — INSULIN LISPRO 100/ML
VIAL (ML) SUBCUTANEOUS
Qty: 0 | Refills: 0 | Status: DISCONTINUED | OUTPATIENT
Start: 2017-03-07 | End: 2017-03-12

## 2017-03-07 RX ORDER — NICARDIPINE HYDROCHLORIDE 30 MG/1
3 CAPSULE, EXTENDED RELEASE ORAL
Qty: 50 | Refills: 0 | Status: DISCONTINUED | OUTPATIENT
Start: 2017-03-07 | End: 2017-03-09

## 2017-03-07 RX ORDER — INSULIN LISPRO 100/ML
VIAL (ML) SUBCUTANEOUS AT BEDTIME
Qty: 0 | Refills: 0 | Status: DISCONTINUED | OUTPATIENT
Start: 2017-03-07 | End: 2017-03-12

## 2017-03-07 RX ORDER — HEPARIN SODIUM 5000 [USP'U]/ML
1300 INJECTION INTRAVENOUS; SUBCUTANEOUS
Qty: 25000 | Refills: 0 | Status: DISCONTINUED | OUTPATIENT
Start: 2017-03-07 | End: 2017-03-09

## 2017-03-07 RX ORDER — HYDRALAZINE HCL 50 MG
10 TABLET ORAL ONCE
Qty: 0 | Refills: 0 | Status: COMPLETED | OUTPATIENT
Start: 2017-03-07 | End: 2017-03-07

## 2017-03-07 RX ORDER — WARFARIN SODIUM 2.5 MG/1
5 TABLET ORAL ONCE
Qty: 0 | Refills: 0 | Status: DISCONTINUED | OUTPATIENT
Start: 2017-03-07 | End: 2017-03-07

## 2017-03-07 RX ORDER — POTASSIUM CHLORIDE 20 MEQ
40 PACKET (EA) ORAL ONCE
Qty: 0 | Refills: 0 | Status: COMPLETED | OUTPATIENT
Start: 2017-03-07 | End: 2017-03-07

## 2017-03-07 RX ORDER — FUROSEMIDE 40 MG
40 TABLET ORAL ONCE
Qty: 0 | Refills: 0 | Status: COMPLETED | OUTPATIENT
Start: 2017-03-07 | End: 2017-03-07

## 2017-03-07 RX ORDER — FENTANYL CITRATE 50 UG/ML
25 INJECTION INTRAVENOUS ONCE
Qty: 0 | Refills: 0 | Status: DISCONTINUED | OUTPATIENT
Start: 2017-03-07 | End: 2017-03-07

## 2017-03-07 RX ORDER — IPRATROPIUM BROMIDE 0.2 MG/ML
500 SOLUTION, NON-ORAL INHALATION EVERY 6 HOURS
Qty: 0 | Refills: 0 | Status: DISCONTINUED | OUTPATIENT
Start: 2017-03-07 | End: 2017-03-15

## 2017-03-07 RX ORDER — WARFARIN SODIUM 2.5 MG/1
2.5 TABLET ORAL ONCE
Qty: 0 | Refills: 0 | Status: COMPLETED | OUTPATIENT
Start: 2017-03-07 | End: 2017-03-07

## 2017-03-07 RX ADMIN — FENTANYL CITRATE 25 MICROGRAM(S): 50 INJECTION INTRAVENOUS at 20:15

## 2017-03-07 RX ADMIN — Medication 50 MILLIEQUIVALENT(S): at 13:15

## 2017-03-07 RX ADMIN — Medication 1: at 10:55

## 2017-03-07 RX ADMIN — HEPARIN SODIUM 18 UNIT(S)/HR: 5000 INJECTION INTRAVENOUS; SUBCUTANEOUS at 19:13

## 2017-03-07 RX ADMIN — Medication 325 MILLIGRAM(S): at 14:24

## 2017-03-07 RX ADMIN — HEPARIN SODIUM 13 UNIT(S)/HR: 5000 INJECTION INTRAVENOUS; SUBCUTANEOUS at 00:53

## 2017-03-07 RX ADMIN — HEPARIN SODIUM 15 UNIT(S)/HR: 5000 INJECTION INTRAVENOUS; SUBCUTANEOUS at 06:05

## 2017-03-07 RX ADMIN — CARVEDILOL PHOSPHATE 6.25 MILLIGRAM(S): 80 CAPSULE, EXTENDED RELEASE ORAL at 17:50

## 2017-03-07 RX ADMIN — Medication 500 MICROGRAM(S): at 17:09

## 2017-03-07 RX ADMIN — Medication 40 MILLIGRAM(S): at 09:47

## 2017-03-07 RX ADMIN — FENTANYL CITRATE 25 MICROGRAM(S): 50 INJECTION INTRAVENOUS at 20:30

## 2017-03-07 RX ADMIN — Medication 500 MICROGRAM(S): at 08:24

## 2017-03-07 RX ADMIN — CARVEDILOL PHOSPHATE 6.25 MILLIGRAM(S): 80 CAPSULE, EXTENDED RELEASE ORAL at 06:49

## 2017-03-07 RX ADMIN — Medication 75 MILLIGRAM(S): at 06:43

## 2017-03-07 RX ADMIN — Medication 10 MILLIGRAM(S): at 09:00

## 2017-03-07 RX ADMIN — HEPARIN SODIUM 3000 UNIT(S): 5000 INJECTION INTRAVENOUS; SUBCUTANEOUS at 05:44

## 2017-03-07 RX ADMIN — Medication 0.25 MILLIGRAM(S): at 00:05

## 2017-03-07 RX ADMIN — Medication 50 MILLIEQUIVALENT(S): at 15:13

## 2017-03-07 RX ADMIN — Medication 75 MILLIGRAM(S): at 17:51

## 2017-03-07 RX ADMIN — SODIUM CHLORIDE 3 MILLILITER(S): 9 INJECTION INTRAMUSCULAR; INTRAVENOUS; SUBCUTANEOUS at 21:59

## 2017-03-07 RX ADMIN — Medication 1: at 14:24

## 2017-03-07 RX ADMIN — PANTOPRAZOLE SODIUM 40 MILLIGRAM(S): 20 TABLET, DELAYED RELEASE ORAL at 14:24

## 2017-03-07 RX ADMIN — Medication 40 MILLIEQUIVALENT(S): at 18:26

## 2017-03-07 RX ADMIN — Medication 50 MILLIEQUIVALENT(S): at 13:45

## 2017-03-07 RX ADMIN — Medication 1: at 17:51

## 2017-03-07 RX ADMIN — Medication 500 MICROGRAM(S): at 12:43

## 2017-03-07 RX ADMIN — WARFARIN SODIUM 2.5 MILLIGRAM(S): 2.5 TABLET ORAL at 22:33

## 2017-03-07 RX ADMIN — WARFARIN SODIUM 5 MILLIGRAM(S): 2.5 TABLET ORAL at 00:05

## 2017-03-07 RX ADMIN — SODIUM CHLORIDE 3 MILLILITER(S): 9 INJECTION INTRAMUSCULAR; INTRAVENOUS; SUBCUTANEOUS at 13:15

## 2017-03-07 RX ADMIN — SODIUM CHLORIDE 3 MILLILITER(S): 9 INJECTION INTRAMUSCULAR; INTRAVENOUS; SUBCUTANEOUS at 05:38

## 2017-03-08 LAB
ALBUMIN SERPL ELPH-MCNC: 3.4 G/DL — SIGNIFICANT CHANGE UP (ref 3.3–5)
ALP SERPL-CCNC: 67 U/L — SIGNIFICANT CHANGE UP (ref 40–120)
ALT FLD-CCNC: 16 U/L RC — SIGNIFICANT CHANGE UP (ref 10–45)
ANION GAP SERPL CALC-SCNC: 11 MMOL/L — SIGNIFICANT CHANGE UP (ref 5–17)
APTT BLD: 63.7 SEC — HIGH (ref 27.5–37.4)
APTT BLD: 72 SEC — HIGH (ref 27.5–37.4)
AST SERPL-CCNC: 17 U/L — SIGNIFICANT CHANGE UP (ref 10–40)
BILIRUB SERPL-MCNC: 1 MG/DL — SIGNIFICANT CHANGE UP (ref 0.2–1.2)
BUN SERPL-MCNC: 20 MG/DL — SIGNIFICANT CHANGE UP (ref 7–23)
CALCIUM SERPL-MCNC: 8.8 MG/DL — SIGNIFICANT CHANGE UP (ref 8.4–10.5)
CHLORIDE SERPL-SCNC: 98 MMOL/L — SIGNIFICANT CHANGE UP (ref 96–108)
CO2 SERPL-SCNC: 29 MMOL/L — SIGNIFICANT CHANGE UP (ref 22–31)
CREAT SERPL-MCNC: 0.51 MG/DL — SIGNIFICANT CHANGE UP (ref 0.5–1.3)
GAS PNL BLDA: SIGNIFICANT CHANGE UP
GAS PNL BLDA: SIGNIFICANT CHANGE UP
GLUCOSE SERPL-MCNC: 174 MG/DL — HIGH (ref 70–99)
HCT VFR BLD CALC: 34.1 % — LOW (ref 39–50)
HGB BLD-MCNC: 11.5 G/DL — LOW (ref 13–17)
INR BLD: 1.9 RATIO — HIGH (ref 0.88–1.16)
MCHC RBC-ENTMCNC: 29.8 PG — SIGNIFICANT CHANGE UP (ref 27–34)
MCHC RBC-ENTMCNC: 33.7 GM/DL — SIGNIFICANT CHANGE UP (ref 32–36)
MCV RBC AUTO: 88.4 FL — SIGNIFICANT CHANGE UP (ref 80–100)
PLATELET # BLD AUTO: 100 K/UL — LOW (ref 150–400)
POTASSIUM SERPL-MCNC: 4.1 MMOL/L — SIGNIFICANT CHANGE UP (ref 3.5–5.3)
POTASSIUM SERPL-SCNC: 4.1 MMOL/L — SIGNIFICANT CHANGE UP (ref 3.5–5.3)
PROT SERPL-MCNC: 6.4 G/DL — SIGNIFICANT CHANGE UP (ref 6–8.3)
PROTHROM AB SERPL-ACNC: 20.6 SEC — HIGH (ref 10–13.1)
RBC # BLD: 3.86 M/UL — LOW (ref 4.2–5.8)
RBC # FLD: 16.8 % — HIGH (ref 10.3–14.5)
SODIUM SERPL-SCNC: 138 MMOL/L — SIGNIFICANT CHANGE UP (ref 135–145)
TSH SERPL-MCNC: 0.88 UIU/ML — SIGNIFICANT CHANGE UP (ref 0.27–4.2)
WBC # BLD: 7.2 K/UL — SIGNIFICANT CHANGE UP (ref 3.8–10.5)
WBC # FLD AUTO: 7.2 K/UL — SIGNIFICANT CHANGE UP (ref 3.8–10.5)

## 2017-03-08 RX ORDER — OXYCODONE HYDROCHLORIDE 5 MG/1
10 TABLET ORAL EVERY 6 HOURS
Qty: 0 | Refills: 0 | Status: DISCONTINUED | OUTPATIENT
Start: 2017-03-08 | End: 2017-03-15

## 2017-03-08 RX ORDER — FENTANYL CITRATE 50 UG/ML
25 INJECTION INTRAVENOUS ONCE
Qty: 0 | Refills: 0 | Status: DISCONTINUED | OUTPATIENT
Start: 2017-03-08 | End: 2017-03-08

## 2017-03-08 RX ORDER — WARFARIN SODIUM 2.5 MG/1
2.5 TABLET ORAL ONCE
Qty: 0 | Refills: 0 | Status: COMPLETED | OUTPATIENT
Start: 2017-03-08 | End: 2017-03-08

## 2017-03-08 RX ORDER — POTASSIUM CHLORIDE 20 MEQ
10 PACKET (EA) ORAL ONCE
Qty: 0 | Refills: 0 | Status: COMPLETED | OUTPATIENT
Start: 2017-03-08 | End: 2017-03-08

## 2017-03-08 RX ORDER — BUDESONIDE, MICRONIZED 100 %
0.5 POWDER (GRAM) MISCELLANEOUS EVERY 12 HOURS
Qty: 0 | Refills: 0 | Status: DISCONTINUED | OUTPATIENT
Start: 2017-03-08 | End: 2017-03-15

## 2017-03-08 RX ORDER — LISINOPRIL 2.5 MG/1
5 TABLET ORAL DAILY
Qty: 0 | Refills: 0 | Status: DISCONTINUED | OUTPATIENT
Start: 2017-03-08 | End: 2017-03-15

## 2017-03-08 RX ORDER — OXYCODONE HYDROCHLORIDE 5 MG/1
5 TABLET ORAL EVERY 6 HOURS
Qty: 0 | Refills: 0 | Status: DISCONTINUED | OUTPATIENT
Start: 2017-03-08 | End: 2017-03-15

## 2017-03-08 RX ORDER — LEVALBUTEROL 1.25 MG/.5ML
0.63 SOLUTION, CONCENTRATE RESPIRATORY (INHALATION) EVERY 6 HOURS
Qty: 0 | Refills: 0 | Status: DISCONTINUED | OUTPATIENT
Start: 2017-03-08 | End: 2017-03-15

## 2017-03-08 RX ORDER — POTASSIUM CHLORIDE 20 MEQ
10 PACKET (EA) ORAL ONCE
Qty: 0 | Refills: 0 | Status: DISCONTINUED | OUTPATIENT
Start: 2017-03-08 | End: 2017-03-08

## 2017-03-08 RX ADMIN — CARVEDILOL PHOSPHATE 6.25 MILLIGRAM(S): 80 CAPSULE, EXTENDED RELEASE ORAL at 06:15

## 2017-03-08 RX ADMIN — PANTOPRAZOLE SODIUM 40 MILLIGRAM(S): 20 TABLET, DELAYED RELEASE ORAL at 08:46

## 2017-03-08 RX ADMIN — Medication 1: at 18:16

## 2017-03-08 RX ADMIN — Medication 500 MICROGRAM(S): at 21:32

## 2017-03-08 RX ADMIN — CARVEDILOL PHOSPHATE 6.25 MILLIGRAM(S): 80 CAPSULE, EXTENDED RELEASE ORAL at 17:57

## 2017-03-08 RX ADMIN — Medication 325 MILLIGRAM(S): at 12:18

## 2017-03-08 RX ADMIN — OXYCODONE HYDROCHLORIDE 10 MILLIGRAM(S): 5 TABLET ORAL at 21:07

## 2017-03-08 RX ADMIN — LEVALBUTEROL 0.63 MILLIGRAM(S): 1.25 SOLUTION, CONCENTRATE RESPIRATORY (INHALATION) at 21:33

## 2017-03-08 RX ADMIN — LISINOPRIL 5 MILLIGRAM(S): 2.5 TABLET ORAL at 08:46

## 2017-03-08 RX ADMIN — OXYCODONE HYDROCHLORIDE 10 MILLIGRAM(S): 5 TABLET ORAL at 04:55

## 2017-03-08 RX ADMIN — OXYCODONE HYDROCHLORIDE 10 MILLIGRAM(S): 5 TABLET ORAL at 11:35

## 2017-03-08 RX ADMIN — OXYCODONE HYDROCHLORIDE 10 MILLIGRAM(S): 5 TABLET ORAL at 11:05

## 2017-03-08 RX ADMIN — NICARDIPINE HYDROCHLORIDE 15 MG/HR: 30 CAPSULE, EXTENDED RELEASE ORAL at 04:23

## 2017-03-08 RX ADMIN — Medication 500 MICROGRAM(S): at 00:28

## 2017-03-08 RX ADMIN — Medication 500 MICROGRAM(S): at 05:45

## 2017-03-08 RX ADMIN — SODIUM CHLORIDE 3 MILLILITER(S): 9 INJECTION INTRAMUSCULAR; INTRAVENOUS; SUBCUTANEOUS at 20:36

## 2017-03-08 RX ADMIN — LEVALBUTEROL 0.63 MILLIGRAM(S): 1.25 SOLUTION, CONCENTRATE RESPIRATORY (INHALATION) at 11:33

## 2017-03-08 RX ADMIN — WARFARIN SODIUM 2.5 MILLIGRAM(S): 2.5 TABLET ORAL at 20:35

## 2017-03-08 RX ADMIN — OXYCODONE HYDROCHLORIDE 10 MILLIGRAM(S): 5 TABLET ORAL at 20:35

## 2017-03-08 RX ADMIN — FENTANYL CITRATE 25 MICROGRAM(S): 50 INJECTION INTRAVENOUS at 01:36

## 2017-03-08 RX ADMIN — HEPARIN SODIUM 18 UNIT(S)/HR: 5000 INJECTION INTRAVENOUS; SUBCUTANEOUS at 02:02

## 2017-03-08 RX ADMIN — OXYCODONE HYDROCHLORIDE 10 MILLIGRAM(S): 5 TABLET ORAL at 04:25

## 2017-03-08 RX ADMIN — HEPARIN SODIUM 18 UNIT(S)/HR: 5000 INJECTION INTRAVENOUS; SUBCUTANEOUS at 09:00

## 2017-03-08 RX ADMIN — Medication 0.5 MILLIGRAM(S): at 21:32

## 2017-03-08 RX ADMIN — Medication 1: at 09:14

## 2017-03-08 RX ADMIN — Medication 1: at 14:17

## 2017-03-08 RX ADMIN — Medication 500 MICROGRAM(S): at 11:19

## 2017-03-08 RX ADMIN — SODIUM CHLORIDE 3 MILLILITER(S): 9 INJECTION INTRAMUSCULAR; INTRAVENOUS; SUBCUTANEOUS at 06:14

## 2017-03-08 RX ADMIN — SODIUM CHLORIDE 3 MILLILITER(S): 9 INJECTION INTRAMUSCULAR; INTRAVENOUS; SUBCUTANEOUS at 14:20

## 2017-03-08 RX ADMIN — Medication 100 MILLIEQUIVALENT(S): at 01:37

## 2017-03-08 RX ADMIN — FENTANYL CITRATE 25 MICROGRAM(S): 50 INJECTION INTRAVENOUS at 01:51

## 2017-03-09 LAB
ALBUMIN SERPL ELPH-MCNC: 3.1 G/DL — LOW (ref 3.3–5)
ALP SERPL-CCNC: 60 U/L — SIGNIFICANT CHANGE UP (ref 40–120)
ALT FLD-CCNC: 15 U/L RC — SIGNIFICANT CHANGE UP (ref 10–45)
ANION GAP SERPL CALC-SCNC: 10 MMOL/L — SIGNIFICANT CHANGE UP (ref 5–17)
APTT BLD: 28.2 SEC — SIGNIFICANT CHANGE UP (ref 27.5–37.4)
APTT BLD: 81 SEC — HIGH (ref 27.5–37.4)
AST SERPL-CCNC: 16 U/L — SIGNIFICANT CHANGE UP (ref 10–40)
BILIRUB SERPL-MCNC: 0.8 MG/DL — SIGNIFICANT CHANGE UP (ref 0.2–1.2)
BUN SERPL-MCNC: 22 MG/DL — SIGNIFICANT CHANGE UP (ref 7–23)
CALCIUM SERPL-MCNC: 8.6 MG/DL — SIGNIFICANT CHANGE UP (ref 8.4–10.5)
CHLORIDE SERPL-SCNC: 98 MMOL/L — SIGNIFICANT CHANGE UP (ref 96–108)
CO2 SERPL-SCNC: 30 MMOL/L — SIGNIFICANT CHANGE UP (ref 22–31)
CREAT SERPL-MCNC: 0.6 MG/DL — SIGNIFICANT CHANGE UP (ref 0.5–1.3)
CULTURE RESULTS: SIGNIFICANT CHANGE UP
CULTURE RESULTS: SIGNIFICANT CHANGE UP
GLUCOSE SERPL-MCNC: 155 MG/DL — HIGH (ref 70–99)
HCT VFR BLD CALC: 32.2 % — LOW (ref 39–50)
HGB BLD-MCNC: 10.4 G/DL — LOW (ref 13–17)
IGA FLD-MCNC: 198 MG/DL — SIGNIFICANT CHANGE UP (ref 68–378)
IGG FLD-MCNC: 1090 MG/DL — SIGNIFICANT CHANGE UP (ref 694–1618)
IGM SERPL-MCNC: 28 MG/DL — LOW (ref 40–230)
INR BLD: 1.97 RATIO — HIGH (ref 0.88–1.16)
INR BLD: 2.2 RATIO — HIGH (ref 0.88–1.16)
INTERPRETATION SERPL IFE-IMP: SIGNIFICANT CHANGE UP
KAPPA LC SER QL IFE: 3.56 MG/DL — HIGH (ref 0.33–1.94)
KAPPA/LAMBDA FREE LIGHT CHAIN RATIO, SERUM: 2.03 RATIO — HIGH (ref 0.26–1.65)
LAMBDA LC SER QL IFE: 1.75 MG/DL — SIGNIFICANT CHANGE UP (ref 0.57–2.63)
MCHC RBC-ENTMCNC: 28.7 PG — SIGNIFICANT CHANGE UP (ref 27–34)
MCHC RBC-ENTMCNC: 32.2 GM/DL — SIGNIFICANT CHANGE UP (ref 32–36)
MCV RBC AUTO: 89.2 FL — SIGNIFICANT CHANGE UP (ref 80–100)
PLATELET # BLD AUTO: 135 K/UL — LOW (ref 150–400)
POTASSIUM SERPL-MCNC: 4 MMOL/L — SIGNIFICANT CHANGE UP (ref 3.5–5.3)
POTASSIUM SERPL-SCNC: 4 MMOL/L — SIGNIFICANT CHANGE UP (ref 3.5–5.3)
PROT SERPL-MCNC: 5.9 G/DL — LOW (ref 6–8.3)
PROTHROM AB SERPL-ACNC: 21.4 SEC — HIGH (ref 10–13.1)
PROTHROM AB SERPL-ACNC: 24.2 SEC — HIGH (ref 10–13.1)
RBC # BLD: 3.61 M/UL — LOW (ref 4.2–5.8)
RBC # FLD: 16.6 % — HIGH (ref 10.3–14.5)
SODIUM SERPL-SCNC: 138 MMOL/L — SIGNIFICANT CHANGE UP (ref 135–145)
SPECIMEN SOURCE: SIGNIFICANT CHANGE UP
SPECIMEN SOURCE: SIGNIFICANT CHANGE UP
WBC # BLD: 6.6 K/UL — SIGNIFICANT CHANGE UP (ref 3.8–10.5)
WBC # FLD AUTO: 6.6 K/UL — SIGNIFICANT CHANGE UP (ref 3.8–10.5)

## 2017-03-09 RX ORDER — ASPIRIN/CALCIUM CARB/MAGNESIUM 324 MG
81 TABLET ORAL DAILY
Qty: 0 | Refills: 0 | Status: DISCONTINUED | OUTPATIENT
Start: 2017-03-09 | End: 2017-03-09

## 2017-03-09 RX ORDER — ASPIRIN/CALCIUM CARB/MAGNESIUM 324 MG
81 TABLET ORAL DAILY
Qty: 0 | Refills: 0 | Status: DISCONTINUED | OUTPATIENT
Start: 2017-03-09 | End: 2017-03-15

## 2017-03-09 RX ORDER — WARFARIN SODIUM 2.5 MG/1
3 TABLET ORAL ONCE
Qty: 0 | Refills: 0 | Status: COMPLETED | OUTPATIENT
Start: 2017-03-09 | End: 2017-03-09

## 2017-03-09 RX ADMIN — Medication 81 MILLIGRAM(S): at 12:32

## 2017-03-09 RX ADMIN — Medication 50 MILLIGRAM(S): at 10:10

## 2017-03-09 RX ADMIN — Medication 100 MILLIGRAM(S): at 12:31

## 2017-03-09 RX ADMIN — Medication 100 MILLIGRAM(S): at 17:35

## 2017-03-09 RX ADMIN — Medication 2: at 17:32

## 2017-03-09 RX ADMIN — CARVEDILOL PHOSPHATE 6.25 MILLIGRAM(S): 80 CAPSULE, EXTENDED RELEASE ORAL at 17:34

## 2017-03-09 RX ADMIN — HEPARIN SODIUM 18 UNIT(S)/HR: 5000 INJECTION INTRAVENOUS; SUBCUTANEOUS at 07:55

## 2017-03-09 RX ADMIN — LEVALBUTEROL 0.63 MILLIGRAM(S): 1.25 SOLUTION, CONCENTRATE RESPIRATORY (INHALATION) at 22:54

## 2017-03-09 RX ADMIN — SODIUM CHLORIDE 3 MILLILITER(S): 9 INJECTION INTRAMUSCULAR; INTRAVENOUS; SUBCUTANEOUS at 05:17

## 2017-03-09 RX ADMIN — PANTOPRAZOLE SODIUM 40 MILLIGRAM(S): 20 TABLET, DELAYED RELEASE ORAL at 05:18

## 2017-03-09 RX ADMIN — HEPARIN SODIUM 18 UNIT(S)/HR: 5000 INJECTION INTRAVENOUS; SUBCUTANEOUS at 04:26

## 2017-03-09 RX ADMIN — OXYCODONE HYDROCHLORIDE 10 MILLIGRAM(S): 5 TABLET ORAL at 20:11

## 2017-03-09 RX ADMIN — LEVALBUTEROL 0.63 MILLIGRAM(S): 1.25 SOLUTION, CONCENTRATE RESPIRATORY (INHALATION) at 12:31

## 2017-03-09 RX ADMIN — Medication 500 MICROGRAM(S): at 05:19

## 2017-03-09 RX ADMIN — Medication 1: at 07:56

## 2017-03-09 RX ADMIN — Medication 1: at 12:32

## 2017-03-09 RX ADMIN — LISINOPRIL 5 MILLIGRAM(S): 2.5 TABLET ORAL at 05:18

## 2017-03-09 RX ADMIN — SODIUM CHLORIDE 3 MILLILITER(S): 9 INJECTION INTRAMUSCULAR; INTRAVENOUS; SUBCUTANEOUS at 13:19

## 2017-03-09 RX ADMIN — WARFARIN SODIUM 3 MILLIGRAM(S): 2.5 TABLET ORAL at 22:48

## 2017-03-09 RX ADMIN — Medication 500 MICROGRAM(S): at 12:31

## 2017-03-09 RX ADMIN — OXYCODONE HYDROCHLORIDE 10 MILLIGRAM(S): 5 TABLET ORAL at 19:41

## 2017-03-09 RX ADMIN — Medication 0.5 MILLIGRAM(S): at 05:18

## 2017-03-09 RX ADMIN — SODIUM CHLORIDE 3 MILLILITER(S): 9 INJECTION INTRAMUSCULAR; INTRAVENOUS; SUBCUTANEOUS at 22:46

## 2017-03-09 RX ADMIN — LEVALBUTEROL 0.63 MILLIGRAM(S): 1.25 SOLUTION, CONCENTRATE RESPIRATORY (INHALATION) at 05:19

## 2017-03-09 RX ADMIN — Medication 0.5 MILLIGRAM(S): at 17:34

## 2017-03-09 RX ADMIN — CARVEDILOL PHOSPHATE 6.25 MILLIGRAM(S): 80 CAPSULE, EXTENDED RELEASE ORAL at 05:18

## 2017-03-09 RX ADMIN — Medication 500 MICROGRAM(S): at 22:54

## 2017-03-09 RX ADMIN — Medication 500 MICROGRAM(S): at 17:34

## 2017-03-09 RX ADMIN — LEVALBUTEROL 0.63 MILLIGRAM(S): 1.25 SOLUTION, CONCENTRATE RESPIRATORY (INHALATION) at 17:34

## 2017-03-10 LAB
ALBUMIN SERPL ELPH-MCNC: 3.3 G/DL — SIGNIFICANT CHANGE UP (ref 3.3–5)
ALP SERPL-CCNC: 62 U/L — SIGNIFICANT CHANGE UP (ref 40–120)
ALT FLD-CCNC: 18 U/L RC — SIGNIFICANT CHANGE UP (ref 10–45)
ANION GAP SERPL CALC-SCNC: 8 MMOL/L — SIGNIFICANT CHANGE UP (ref 5–17)
APTT BLD: 25.3 SEC — LOW (ref 27.5–37.4)
AST SERPL-CCNC: 17 U/L — SIGNIFICANT CHANGE UP (ref 10–40)
BILIRUB SERPL-MCNC: 0.7 MG/DL — SIGNIFICANT CHANGE UP (ref 0.2–1.2)
BUN SERPL-MCNC: 17 MG/DL — SIGNIFICANT CHANGE UP (ref 7–23)
CALCIUM SERPL-MCNC: 9.2 MG/DL — SIGNIFICANT CHANGE UP (ref 8.4–10.5)
CHLORIDE SERPL-SCNC: 98 MMOL/L — SIGNIFICANT CHANGE UP (ref 96–108)
CO2 SERPL-SCNC: 33 MMOL/L — HIGH (ref 22–31)
CREAT SERPL-MCNC: 0.53 MG/DL — SIGNIFICANT CHANGE UP (ref 0.5–1.3)
GLUCOSE SERPL-MCNC: 133 MG/DL — HIGH (ref 70–99)
HCT VFR BLD CALC: 30.2 % — LOW (ref 39–50)
HGB BLD-MCNC: 10.2 G/DL — LOW (ref 13–17)
INR BLD: 1.79 RATIO — HIGH (ref 0.88–1.16)
MCHC RBC-ENTMCNC: 29.9 PG — SIGNIFICANT CHANGE UP (ref 27–34)
MCHC RBC-ENTMCNC: 33.7 GM/DL — SIGNIFICANT CHANGE UP (ref 32–36)
MCV RBC AUTO: 88.9 FL — SIGNIFICANT CHANGE UP (ref 80–100)
PLATELET # BLD AUTO: 118 K/UL — LOW (ref 150–400)
POTASSIUM SERPL-MCNC: 4.7 MMOL/L — SIGNIFICANT CHANGE UP (ref 3.5–5.3)
POTASSIUM SERPL-SCNC: 4.7 MMOL/L — SIGNIFICANT CHANGE UP (ref 3.5–5.3)
PROT SERPL-MCNC: 5.9 G/DL — LOW (ref 6–8.3)
PROTHROM AB SERPL-ACNC: 19.4 SEC — HIGH (ref 10–13.1)
RBC # BLD: 3.4 M/UL — LOW (ref 4.2–5.8)
RBC # FLD: 17.2 % — HIGH (ref 10.3–14.5)
SODIUM SERPL-SCNC: 139 MMOL/L — SIGNIFICANT CHANGE UP (ref 135–145)
WBC # BLD: 6.2 K/UL — SIGNIFICANT CHANGE UP (ref 3.8–10.5)
WBC # FLD AUTO: 6.2 K/UL — SIGNIFICANT CHANGE UP (ref 3.8–10.5)

## 2017-03-10 PROCEDURE — 75572 CT HRT W/3D IMAGE: CPT | Mod: 26

## 2017-03-10 RX ORDER — WARFARIN SODIUM 2.5 MG/1
3 TABLET ORAL ONCE
Qty: 0 | Refills: 0 | Status: DISCONTINUED | OUTPATIENT
Start: 2017-03-10 | End: 2017-03-10

## 2017-03-10 RX ORDER — WARFARIN SODIUM 2.5 MG/1
4 TABLET ORAL ONCE
Qty: 0 | Refills: 0 | Status: COMPLETED | OUTPATIENT
Start: 2017-03-10 | End: 2017-03-10

## 2017-03-10 RX ADMIN — LEVALBUTEROL 0.63 MILLIGRAM(S): 1.25 SOLUTION, CONCENTRATE RESPIRATORY (INHALATION) at 17:09

## 2017-03-10 RX ADMIN — Medication 1: at 12:13

## 2017-03-10 RX ADMIN — SODIUM CHLORIDE 3 MILLILITER(S): 9 INJECTION INTRAMUSCULAR; INTRAVENOUS; SUBCUTANEOUS at 14:25

## 2017-03-10 RX ADMIN — OXYCODONE HYDROCHLORIDE 10 MILLIGRAM(S): 5 TABLET ORAL at 19:57

## 2017-03-10 RX ADMIN — CARVEDILOL PHOSPHATE 6.25 MILLIGRAM(S): 80 CAPSULE, EXTENDED RELEASE ORAL at 17:09

## 2017-03-10 RX ADMIN — Medication 500 MICROGRAM(S): at 17:08

## 2017-03-10 RX ADMIN — LEVALBUTEROL 0.63 MILLIGRAM(S): 1.25 SOLUTION, CONCENTRATE RESPIRATORY (INHALATION) at 12:14

## 2017-03-10 RX ADMIN — PANTOPRAZOLE SODIUM 40 MILLIGRAM(S): 20 TABLET, DELAYED RELEASE ORAL at 05:40

## 2017-03-10 RX ADMIN — WARFARIN SODIUM 4 MILLIGRAM(S): 2.5 TABLET ORAL at 22:22

## 2017-03-10 RX ADMIN — LISINOPRIL 5 MILLIGRAM(S): 2.5 TABLET ORAL at 05:40

## 2017-03-10 RX ADMIN — Medication 1: at 17:09

## 2017-03-10 RX ADMIN — Medication 0.5 MILLIGRAM(S): at 05:41

## 2017-03-10 RX ADMIN — Medication 500 MICROGRAM(S): at 22:22

## 2017-03-10 RX ADMIN — SODIUM CHLORIDE 3 MILLILITER(S): 9 INJECTION INTRAMUSCULAR; INTRAVENOUS; SUBCUTANEOUS at 05:39

## 2017-03-10 RX ADMIN — LEVALBUTEROL 0.63 MILLIGRAM(S): 1.25 SOLUTION, CONCENTRATE RESPIRATORY (INHALATION) at 05:42

## 2017-03-10 RX ADMIN — Medication 50 MILLIGRAM(S): at 05:43

## 2017-03-10 RX ADMIN — Medication 81 MILLIGRAM(S): at 12:14

## 2017-03-10 RX ADMIN — Medication 500 MICROGRAM(S): at 12:14

## 2017-03-10 RX ADMIN — SODIUM CHLORIDE 3 MILLILITER(S): 9 INJECTION INTRAMUSCULAR; INTRAVENOUS; SUBCUTANEOUS at 22:15

## 2017-03-10 RX ADMIN — Medication 500 MICROGRAM(S): at 05:42

## 2017-03-10 RX ADMIN — Medication 0.5 MILLIGRAM(S): at 17:09

## 2017-03-10 RX ADMIN — LEVALBUTEROL 0.63 MILLIGRAM(S): 1.25 SOLUTION, CONCENTRATE RESPIRATORY (INHALATION) at 22:22

## 2017-03-10 RX ADMIN — OXYCODONE HYDROCHLORIDE 10 MILLIGRAM(S): 5 TABLET ORAL at 20:30

## 2017-03-10 RX ADMIN — CARVEDILOL PHOSPHATE 6.25 MILLIGRAM(S): 80 CAPSULE, EXTENDED RELEASE ORAL at 05:40

## 2017-03-11 LAB
ANION GAP SERPL CALC-SCNC: 13 MMOL/L — SIGNIFICANT CHANGE UP (ref 5–17)
BUN SERPL-MCNC: 17 MG/DL — SIGNIFICANT CHANGE UP (ref 7–23)
CALCIUM SERPL-MCNC: 9.1 MG/DL — SIGNIFICANT CHANGE UP (ref 8.4–10.5)
CHLORIDE SERPL-SCNC: 97 MMOL/L — SIGNIFICANT CHANGE UP (ref 96–108)
CO2 SERPL-SCNC: 29 MMOL/L — SIGNIFICANT CHANGE UP (ref 22–31)
CREAT SERPL-MCNC: 0.58 MG/DL — SIGNIFICANT CHANGE UP (ref 0.5–1.3)
GLUCOSE SERPL-MCNC: 107 MG/DL — HIGH (ref 70–99)
HCT VFR BLD CALC: 31.8 % — LOW (ref 39–50)
HGB BLD-MCNC: 10.5 G/DL — LOW (ref 13–17)
INR BLD: 1.63 RATIO — HIGH (ref 0.88–1.16)
MCHC RBC-ENTMCNC: 29.6 PG — SIGNIFICANT CHANGE UP (ref 27–34)
MCHC RBC-ENTMCNC: 33.1 GM/DL — SIGNIFICANT CHANGE UP (ref 32–36)
MCV RBC AUTO: 89.2 FL — SIGNIFICANT CHANGE UP (ref 80–100)
PLATELET # BLD AUTO: 139 K/UL — LOW (ref 150–400)
POTASSIUM SERPL-MCNC: 4.2 MMOL/L — SIGNIFICANT CHANGE UP (ref 3.5–5.3)
POTASSIUM SERPL-SCNC: 4.2 MMOL/L — SIGNIFICANT CHANGE UP (ref 3.5–5.3)
PROTHROM AB SERPL-ACNC: 17.9 SEC — HIGH (ref 10–13.1)
RBC # BLD: 3.56 M/UL — LOW (ref 4.2–5.8)
RBC # FLD: 17.8 % — HIGH (ref 10.3–14.5)
SODIUM SERPL-SCNC: 139 MMOL/L — SIGNIFICANT CHANGE UP (ref 135–145)
WBC # BLD: 6 K/UL — SIGNIFICANT CHANGE UP (ref 3.8–10.5)
WBC # FLD AUTO: 6 K/UL — SIGNIFICANT CHANGE UP (ref 3.8–10.5)

## 2017-03-11 RX ORDER — HEPARIN SODIUM 5000 [USP'U]/ML
7500 INJECTION INTRAVENOUS; SUBCUTANEOUS EVERY 6 HOURS
Qty: 0 | Refills: 0 | Status: DISCONTINUED | OUTPATIENT
Start: 2017-03-11 | End: 2017-03-15

## 2017-03-11 RX ORDER — HEPARIN SODIUM 5000 [USP'U]/ML
7500 INJECTION INTRAVENOUS; SUBCUTANEOUS ONCE
Qty: 0 | Refills: 0 | Status: COMPLETED | OUTPATIENT
Start: 2017-03-11 | End: 2017-03-11

## 2017-03-11 RX ORDER — HEPARIN SODIUM 5000 [USP'U]/ML
INJECTION INTRAVENOUS; SUBCUTANEOUS
Qty: 25000 | Refills: 0 | Status: DISCONTINUED | OUTPATIENT
Start: 2017-03-11 | End: 2017-03-15

## 2017-03-11 RX ORDER — HEPARIN SODIUM 5000 [USP'U]/ML
3500 INJECTION INTRAVENOUS; SUBCUTANEOUS EVERY 6 HOURS
Qty: 0 | Refills: 0 | Status: DISCONTINUED | OUTPATIENT
Start: 2017-03-11 | End: 2017-03-15

## 2017-03-11 RX ORDER — WARFARIN SODIUM 2.5 MG/1
7.5 TABLET ORAL ONCE
Qty: 0 | Refills: 0 | Status: DISCONTINUED | OUTPATIENT
Start: 2017-03-11 | End: 2017-03-11

## 2017-03-11 RX ADMIN — HEPARIN SODIUM 1700 UNIT(S)/HR: 5000 INJECTION INTRAVENOUS; SUBCUTANEOUS at 18:59

## 2017-03-11 RX ADMIN — Medication 0.5 MILLIGRAM(S): at 06:26

## 2017-03-11 RX ADMIN — Medication 500 MICROGRAM(S): at 12:05

## 2017-03-11 RX ADMIN — Medication 0.5 MILLIGRAM(S): at 17:09

## 2017-03-11 RX ADMIN — Medication 500 MICROGRAM(S): at 06:26

## 2017-03-11 RX ADMIN — Medication 3: at 12:05

## 2017-03-11 RX ADMIN — SODIUM CHLORIDE 3 MILLILITER(S): 9 INJECTION INTRAMUSCULAR; INTRAVENOUS; SUBCUTANEOUS at 13:12

## 2017-03-11 RX ADMIN — LEVALBUTEROL 0.63 MILLIGRAM(S): 1.25 SOLUTION, CONCENTRATE RESPIRATORY (INHALATION) at 12:05

## 2017-03-11 RX ADMIN — Medication 2: at 17:04

## 2017-03-11 RX ADMIN — SODIUM CHLORIDE 3 MILLILITER(S): 9 INJECTION INTRAMUSCULAR; INTRAVENOUS; SUBCUTANEOUS at 20:49

## 2017-03-11 RX ADMIN — LEVALBUTEROL 0.63 MILLIGRAM(S): 1.25 SOLUTION, CONCENTRATE RESPIRATORY (INHALATION) at 17:08

## 2017-03-11 RX ADMIN — PANTOPRAZOLE SODIUM 40 MILLIGRAM(S): 20 TABLET, DELAYED RELEASE ORAL at 06:27

## 2017-03-11 RX ADMIN — LEVALBUTEROL 0.63 MILLIGRAM(S): 1.25 SOLUTION, CONCENTRATE RESPIRATORY (INHALATION) at 06:26

## 2017-03-11 RX ADMIN — CARVEDILOL PHOSPHATE 6.25 MILLIGRAM(S): 80 CAPSULE, EXTENDED RELEASE ORAL at 17:08

## 2017-03-11 RX ADMIN — LISINOPRIL 5 MILLIGRAM(S): 2.5 TABLET ORAL at 06:26

## 2017-03-11 RX ADMIN — HEPARIN SODIUM 7500 UNIT(S): 5000 INJECTION INTRAVENOUS; SUBCUTANEOUS at 19:09

## 2017-03-11 RX ADMIN — SODIUM CHLORIDE 3 MILLILITER(S): 9 INJECTION INTRAMUSCULAR; INTRAVENOUS; SUBCUTANEOUS at 06:22

## 2017-03-11 RX ADMIN — OXYCODONE HYDROCHLORIDE 10 MILLIGRAM(S): 5 TABLET ORAL at 06:26

## 2017-03-11 RX ADMIN — Medication 81 MILLIGRAM(S): at 12:05

## 2017-03-11 RX ADMIN — Medication 50 MILLIGRAM(S): at 06:27

## 2017-03-11 RX ADMIN — OXYCODONE HYDROCHLORIDE 10 MILLIGRAM(S): 5 TABLET ORAL at 07:00

## 2017-03-11 RX ADMIN — OXYCODONE HYDROCHLORIDE 10 MILLIGRAM(S): 5 TABLET ORAL at 18:15

## 2017-03-11 RX ADMIN — OXYCODONE HYDROCHLORIDE 10 MILLIGRAM(S): 5 TABLET ORAL at 18:45

## 2017-03-11 RX ADMIN — CARVEDILOL PHOSPHATE 6.25 MILLIGRAM(S): 80 CAPSULE, EXTENDED RELEASE ORAL at 06:27

## 2017-03-11 RX ADMIN — Medication 1: at 08:06

## 2017-03-11 RX ADMIN — Medication 500 MICROGRAM(S): at 17:08

## 2017-03-12 LAB
ANION GAP SERPL CALC-SCNC: 11 MMOL/L — SIGNIFICANT CHANGE UP (ref 5–17)
APTT BLD: 73.4 SEC — HIGH (ref 27.5–37.4)
BUN SERPL-MCNC: 15 MG/DL — SIGNIFICANT CHANGE UP (ref 7–23)
CALCIUM SERPL-MCNC: 8.5 MG/DL — SIGNIFICANT CHANGE UP (ref 8.4–10.5)
CHLORIDE SERPL-SCNC: 99 MMOL/L — SIGNIFICANT CHANGE UP (ref 96–108)
CO2 SERPL-SCNC: 29 MMOL/L — SIGNIFICANT CHANGE UP (ref 22–31)
CREAT SERPL-MCNC: 0.56 MG/DL — SIGNIFICANT CHANGE UP (ref 0.5–1.3)
GLUCOSE SERPL-MCNC: 167 MG/DL — HIGH (ref 70–99)
HCT VFR BLD CALC: 31.5 % — LOW (ref 39–50)
HGB BLD-MCNC: 10.3 G/DL — LOW (ref 13–17)
INR BLD: 1.76 RATIO — HIGH (ref 0.88–1.16)
MCHC RBC-ENTMCNC: 29.3 PG — SIGNIFICANT CHANGE UP (ref 27–34)
MCHC RBC-ENTMCNC: 32.6 GM/DL — SIGNIFICANT CHANGE UP (ref 32–36)
MCV RBC AUTO: 89.7 FL — SIGNIFICANT CHANGE UP (ref 80–100)
PLATELET # BLD AUTO: 149 K/UL — LOW (ref 150–400)
POTASSIUM SERPL-MCNC: 3.8 MMOL/L — SIGNIFICANT CHANGE UP (ref 3.5–5.3)
POTASSIUM SERPL-SCNC: 3.8 MMOL/L — SIGNIFICANT CHANGE UP (ref 3.5–5.3)
PROTHROM AB SERPL-ACNC: 19.1 SEC — HIGH (ref 10–13.1)
RBC # BLD: 3.51 M/UL — LOW (ref 4.2–5.8)
RBC # FLD: 17.8 % — HIGH (ref 10.3–14.5)
SODIUM SERPL-SCNC: 139 MMOL/L — SIGNIFICANT CHANGE UP (ref 135–145)
WBC # BLD: 5.9 K/UL — SIGNIFICANT CHANGE UP (ref 3.8–10.5)
WBC # FLD AUTO: 5.9 K/UL — SIGNIFICANT CHANGE UP (ref 3.8–10.5)

## 2017-03-12 RX ORDER — DEXTROSE 50 % IN WATER 50 %
25 SYRINGE (ML) INTRAVENOUS ONCE
Qty: 0 | Refills: 0 | Status: DISCONTINUED | OUTPATIENT
Start: 2017-03-12 | End: 2017-03-15

## 2017-03-12 RX ORDER — INSULIN LISPRO 100/ML
VIAL (ML) SUBCUTANEOUS
Qty: 0 | Refills: 0 | Status: DISCONTINUED | OUTPATIENT
Start: 2017-03-12 | End: 2017-03-15

## 2017-03-12 RX ORDER — DEXTROSE 50 % IN WATER 50 %
1 SYRINGE (ML) INTRAVENOUS ONCE
Qty: 0 | Refills: 0 | Status: DISCONTINUED | OUTPATIENT
Start: 2017-03-12 | End: 2017-03-15

## 2017-03-12 RX ORDER — GLUCAGON INJECTION, SOLUTION 0.5 MG/.1ML
1 INJECTION, SOLUTION SUBCUTANEOUS ONCE
Qty: 0 | Refills: 0 | Status: DISCONTINUED | OUTPATIENT
Start: 2017-03-12 | End: 2017-03-15

## 2017-03-12 RX ORDER — INSULIN GLARGINE 100 [IU]/ML
5 INJECTION, SOLUTION SUBCUTANEOUS AT BEDTIME
Qty: 0 | Refills: 0 | Status: DISCONTINUED | OUTPATIENT
Start: 2017-03-12 | End: 2017-03-14

## 2017-03-12 RX ORDER — POTASSIUM CHLORIDE 20 MEQ
20 PACKET (EA) ORAL ONCE
Qty: 0 | Refills: 0 | Status: COMPLETED | OUTPATIENT
Start: 2017-03-12 | End: 2017-03-12

## 2017-03-12 RX ORDER — DEXTROSE 50 % IN WATER 50 %
12.5 SYRINGE (ML) INTRAVENOUS ONCE
Qty: 0 | Refills: 0 | Status: DISCONTINUED | OUTPATIENT
Start: 2017-03-12 | End: 2017-03-15

## 2017-03-12 RX ORDER — SODIUM CHLORIDE 9 MG/ML
1000 INJECTION, SOLUTION INTRAVENOUS
Qty: 0 | Refills: 0 | Status: DISCONTINUED | OUTPATIENT
Start: 2017-03-12 | End: 2017-03-15

## 2017-03-12 RX ORDER — INSULIN LISPRO 100/ML
VIAL (ML) SUBCUTANEOUS AT BEDTIME
Qty: 0 | Refills: 0 | Status: DISCONTINUED | OUTPATIENT
Start: 2017-03-12 | End: 2017-03-15

## 2017-03-12 RX ORDER — INSULIN LISPRO 100/ML
2 VIAL (ML) SUBCUTANEOUS
Qty: 0 | Refills: 0 | Status: DISCONTINUED | OUTPATIENT
Start: 2017-03-12 | End: 2017-03-15

## 2017-03-12 RX ADMIN — Medication 500 MICROGRAM(S): at 01:21

## 2017-03-12 RX ADMIN — OXYCODONE HYDROCHLORIDE 10 MILLIGRAM(S): 5 TABLET ORAL at 20:20

## 2017-03-12 RX ADMIN — LEVALBUTEROL 0.63 MILLIGRAM(S): 1.25 SOLUTION, CONCENTRATE RESPIRATORY (INHALATION) at 11:08

## 2017-03-12 RX ADMIN — LEVALBUTEROL 0.63 MILLIGRAM(S): 1.25 SOLUTION, CONCENTRATE RESPIRATORY (INHALATION) at 17:06

## 2017-03-12 RX ADMIN — OXYCODONE HYDROCHLORIDE 10 MILLIGRAM(S): 5 TABLET ORAL at 19:48

## 2017-03-12 RX ADMIN — Medication 81 MILLIGRAM(S): at 11:09

## 2017-03-12 RX ADMIN — INSULIN GLARGINE 5 UNIT(S): 100 INJECTION, SOLUTION SUBCUTANEOUS at 21:51

## 2017-03-12 RX ADMIN — LEVALBUTEROL 0.63 MILLIGRAM(S): 1.25 SOLUTION, CONCENTRATE RESPIRATORY (INHALATION) at 23:42

## 2017-03-12 RX ADMIN — CARVEDILOL PHOSPHATE 6.25 MILLIGRAM(S): 80 CAPSULE, EXTENDED RELEASE ORAL at 05:48

## 2017-03-12 RX ADMIN — Medication 20 MILLIEQUIVALENT(S): at 05:47

## 2017-03-12 RX ADMIN — LEVALBUTEROL 0.63 MILLIGRAM(S): 1.25 SOLUTION, CONCENTRATE RESPIRATORY (INHALATION) at 05:49

## 2017-03-12 RX ADMIN — Medication 0.5 MILLIGRAM(S): at 05:48

## 2017-03-12 RX ADMIN — Medication 500 MICROGRAM(S): at 19:45

## 2017-03-12 RX ADMIN — OXYCODONE HYDROCHLORIDE 10 MILLIGRAM(S): 5 TABLET ORAL at 01:17

## 2017-03-12 RX ADMIN — OXYCODONE HYDROCHLORIDE 10 MILLIGRAM(S): 5 TABLET ORAL at 00:17

## 2017-03-12 RX ADMIN — OXYCODONE HYDROCHLORIDE 10 MILLIGRAM(S): 5 TABLET ORAL at 11:37

## 2017-03-12 RX ADMIN — OXYCODONE HYDROCHLORIDE 10 MILLIGRAM(S): 5 TABLET ORAL at 11:07

## 2017-03-12 RX ADMIN — Medication 500 MICROGRAM(S): at 23:41

## 2017-03-12 RX ADMIN — Medication 0.5 MILLIGRAM(S): at 17:06

## 2017-03-12 RX ADMIN — SODIUM CHLORIDE 3 MILLILITER(S): 9 INJECTION INTRAMUSCULAR; INTRAVENOUS; SUBCUTANEOUS at 05:53

## 2017-03-12 RX ADMIN — Medication 500 MICROGRAM(S): at 05:48

## 2017-03-12 RX ADMIN — SODIUM CHLORIDE 3 MILLILITER(S): 9 INJECTION INTRAMUSCULAR; INTRAVENOUS; SUBCUTANEOUS at 21:08

## 2017-03-12 RX ADMIN — LISINOPRIL 5 MILLIGRAM(S): 2.5 TABLET ORAL at 05:48

## 2017-03-12 RX ADMIN — HEPARIN SODIUM 1700 UNIT(S)/HR: 5000 INJECTION INTRAVENOUS; SUBCUTANEOUS at 01:58

## 2017-03-12 RX ADMIN — HEPARIN SODIUM 1700 UNIT(S)/HR: 5000 INJECTION INTRAVENOUS; SUBCUTANEOUS at 10:27

## 2017-03-12 RX ADMIN — Medication 2 UNIT(S): at 17:05

## 2017-03-12 RX ADMIN — Medication 4: at 11:53

## 2017-03-12 RX ADMIN — Medication 50 MILLIGRAM(S): at 05:49

## 2017-03-12 RX ADMIN — Medication 1: at 17:05

## 2017-03-12 RX ADMIN — LEVALBUTEROL 0.63 MILLIGRAM(S): 1.25 SOLUTION, CONCENTRATE RESPIRATORY (INHALATION) at 01:21

## 2017-03-12 RX ADMIN — SODIUM CHLORIDE 3 MILLILITER(S): 9 INJECTION INTRAMUSCULAR; INTRAVENOUS; SUBCUTANEOUS at 10:58

## 2017-03-12 RX ADMIN — PANTOPRAZOLE SODIUM 40 MILLIGRAM(S): 20 TABLET, DELAYED RELEASE ORAL at 06:43

## 2017-03-12 RX ADMIN — Medication 500 MICROGRAM(S): at 11:08

## 2017-03-12 RX ADMIN — CARVEDILOL PHOSPHATE 6.25 MILLIGRAM(S): 80 CAPSULE, EXTENDED RELEASE ORAL at 17:06

## 2017-03-12 NOTE — PROVIDER CONTACT NOTE (CHANGE IN STATUS NOTIFICATION) - ASSESSMENT
Pt in bed during the episode, pt denies chest pain, palpitations and or SOB.  /66, HR 88 O2sat 98% on room air.

## 2017-03-12 NOTE — PROVIDER CONTACT NOTE (CHANGE IN STATUS NOTIFICATION) - ACTION/TREATMENT ORDERED:
Apoorva DUBOIS notified no orders at this waiting on lab results.  Continue to monitor pt.  call bell in easy reach.

## 2017-03-13 LAB
ANION GAP SERPL CALC-SCNC: 13 MMOL/L — SIGNIFICANT CHANGE UP (ref 5–17)
APTT BLD: 69.3 SEC — HIGH (ref 27.5–37.4)
BUN SERPL-MCNC: 15 MG/DL — SIGNIFICANT CHANGE UP (ref 7–23)
CALCIUM SERPL-MCNC: 9.1 MG/DL — SIGNIFICANT CHANGE UP (ref 8.4–10.5)
CHLORIDE SERPL-SCNC: 99 MMOL/L — SIGNIFICANT CHANGE UP (ref 96–108)
CO2 SERPL-SCNC: 28 MMOL/L — SIGNIFICANT CHANGE UP (ref 22–31)
CREAT SERPL-MCNC: 0.6 MG/DL — SIGNIFICANT CHANGE UP (ref 0.5–1.3)
GLUCOSE SERPL-MCNC: 142 MG/DL — HIGH (ref 70–99)
HCT VFR BLD CALC: 32.9 % — LOW (ref 39–50)
HGB BLD-MCNC: 11.1 G/DL — LOW (ref 13–17)
INR BLD: 1.49 RATIO — HIGH (ref 0.88–1.16)
MCHC RBC-ENTMCNC: 30.6 PG — SIGNIFICANT CHANGE UP (ref 27–34)
MCHC RBC-ENTMCNC: 33.7 GM/DL — SIGNIFICANT CHANGE UP (ref 32–36)
MCV RBC AUTO: 90.8 FL — SIGNIFICANT CHANGE UP (ref 80–100)
PLATELET # BLD AUTO: 148 K/UL — LOW (ref 150–400)
POTASSIUM SERPL-MCNC: 4.1 MMOL/L — SIGNIFICANT CHANGE UP (ref 3.5–5.3)
POTASSIUM SERPL-SCNC: 4.1 MMOL/L — SIGNIFICANT CHANGE UP (ref 3.5–5.3)
PROTHROM AB SERPL-ACNC: 16.3 SEC — HIGH (ref 10–13.1)
RBC # BLD: 3.62 M/UL — LOW (ref 4.2–5.8)
RBC # FLD: 18.5 % — HIGH (ref 10.3–14.5)
SODIUM SERPL-SCNC: 140 MMOL/L — SIGNIFICANT CHANGE UP (ref 135–145)
WBC # BLD: 6.2 K/UL — SIGNIFICANT CHANGE UP (ref 3.8–10.5)
WBC # FLD AUTO: 6.2 K/UL — SIGNIFICANT CHANGE UP (ref 3.8–10.5)

## 2017-03-13 PROCEDURE — 93880 EXTRACRANIAL BILAT STUDY: CPT | Mod: 26

## 2017-03-13 RX ADMIN — Medication 2 UNIT(S): at 18:22

## 2017-03-13 RX ADMIN — OXYCODONE HYDROCHLORIDE 10 MILLIGRAM(S): 5 TABLET ORAL at 06:20

## 2017-03-13 RX ADMIN — Medication 2 UNIT(S): at 07:47

## 2017-03-13 RX ADMIN — OXYCODONE HYDROCHLORIDE 10 MILLIGRAM(S): 5 TABLET ORAL at 05:50

## 2017-03-13 RX ADMIN — SODIUM CHLORIDE 3 MILLILITER(S): 9 INJECTION INTRAMUSCULAR; INTRAVENOUS; SUBCUTANEOUS at 11:39

## 2017-03-13 RX ADMIN — Medication 81 MILLIGRAM(S): at 11:42

## 2017-03-13 RX ADMIN — Medication 500 MICROGRAM(S): at 18:27

## 2017-03-13 RX ADMIN — Medication 2 UNIT(S): at 11:41

## 2017-03-13 RX ADMIN — CARVEDILOL PHOSPHATE 6.25 MILLIGRAM(S): 80 CAPSULE, EXTENDED RELEASE ORAL at 18:25

## 2017-03-13 RX ADMIN — CARVEDILOL PHOSPHATE 6.25 MILLIGRAM(S): 80 CAPSULE, EXTENDED RELEASE ORAL at 05:51

## 2017-03-13 RX ADMIN — SODIUM CHLORIDE 3 MILLILITER(S): 9 INJECTION INTRAMUSCULAR; INTRAVENOUS; SUBCUTANEOUS at 22:26

## 2017-03-13 RX ADMIN — Medication 0: at 22:34

## 2017-03-13 RX ADMIN — INSULIN GLARGINE 5 UNIT(S): 100 INJECTION, SOLUTION SUBCUTANEOUS at 22:33

## 2017-03-13 RX ADMIN — LEVALBUTEROL 0.63 MILLIGRAM(S): 1.25 SOLUTION, CONCENTRATE RESPIRATORY (INHALATION) at 11:43

## 2017-03-13 RX ADMIN — OXYCODONE HYDROCHLORIDE 10 MILLIGRAM(S): 5 TABLET ORAL at 22:18

## 2017-03-13 RX ADMIN — Medication 500 MICROGRAM(S): at 05:51

## 2017-03-13 RX ADMIN — Medication 1: at 18:22

## 2017-03-13 RX ADMIN — LEVALBUTEROL 0.63 MILLIGRAM(S): 1.25 SOLUTION, CONCENTRATE RESPIRATORY (INHALATION) at 05:51

## 2017-03-13 RX ADMIN — LEVALBUTEROL 0.63 MILLIGRAM(S): 1.25 SOLUTION, CONCENTRATE RESPIRATORY (INHALATION) at 18:23

## 2017-03-13 RX ADMIN — Medication: at 11:43

## 2017-03-13 RX ADMIN — Medication 100 MILLIGRAM(S): at 18:24

## 2017-03-13 RX ADMIN — SODIUM CHLORIDE 3 MILLILITER(S): 9 INJECTION INTRAMUSCULAR; INTRAVENOUS; SUBCUTANEOUS at 05:49

## 2017-03-13 RX ADMIN — Medication 0.5 MILLIGRAM(S): at 05:51

## 2017-03-13 RX ADMIN — OXYCODONE HYDROCHLORIDE 10 MILLIGRAM(S): 5 TABLET ORAL at 23:03

## 2017-03-13 RX ADMIN — LISINOPRIL 5 MILLIGRAM(S): 2.5 TABLET ORAL at 05:51

## 2017-03-13 RX ADMIN — PANTOPRAZOLE SODIUM 40 MILLIGRAM(S): 20 TABLET, DELAYED RELEASE ORAL at 06:36

## 2017-03-13 RX ADMIN — Medication 100 MILLIGRAM(S): at 07:47

## 2017-03-13 RX ADMIN — Medication 50 MILLIGRAM(S): at 06:36

## 2017-03-13 RX ADMIN — Medication 500 MICROGRAM(S): at 11:41

## 2017-03-13 RX ADMIN — HEPARIN SODIUM 1700 UNIT(S)/HR: 5000 INJECTION INTRAVENOUS; SUBCUTANEOUS at 07:49

## 2017-03-14 LAB
ALBUMIN SERPL ELPH-MCNC: 3.2 G/DL — LOW (ref 3.3–5)
ALP SERPL-CCNC: 77 U/L — SIGNIFICANT CHANGE UP (ref 40–120)
ALT FLD-CCNC: 20 U/L RC — SIGNIFICANT CHANGE UP (ref 10–45)
ANION GAP SERPL CALC-SCNC: 15 MMOL/L — SIGNIFICANT CHANGE UP (ref 5–17)
APPEARANCE UR: CLEAR — SIGNIFICANT CHANGE UP
AST SERPL-CCNC: 13 U/L — SIGNIFICANT CHANGE UP (ref 10–40)
BILIRUB SERPL-MCNC: 0.5 MG/DL — SIGNIFICANT CHANGE UP (ref 0.2–1.2)
BILIRUB UR-MCNC: NEGATIVE — SIGNIFICANT CHANGE UP
BLD GP AB SCN SERPL QL: NEGATIVE — SIGNIFICANT CHANGE UP
BUN SERPL-MCNC: 17 MG/DL — SIGNIFICANT CHANGE UP (ref 7–23)
CALCIUM SERPL-MCNC: 8.7 MG/DL — SIGNIFICANT CHANGE UP (ref 8.4–10.5)
CHLORIDE SERPL-SCNC: 101 MMOL/L — SIGNIFICANT CHANGE UP (ref 96–108)
CO2 SERPL-SCNC: 25 MMOL/L — SIGNIFICANT CHANGE UP (ref 22–31)
COLOR SPEC: YELLOW — SIGNIFICANT CHANGE UP
CREAT SERPL-MCNC: 0.56 MG/DL — SIGNIFICANT CHANGE UP (ref 0.5–1.3)
DIFF PNL FLD: NEGATIVE — SIGNIFICANT CHANGE UP
GLUCOSE SERPL-MCNC: 156 MG/DL — HIGH (ref 70–99)
GLUCOSE UR QL: >1000
HCT VFR BLD CALC: 31.2 % — LOW (ref 39–50)
HGB BLD-MCNC: 9.8 G/DL — LOW (ref 13–17)
INR BLD: 1.41 RATIO — HIGH (ref 0.88–1.16)
KETONES UR-MCNC: NEGATIVE — SIGNIFICANT CHANGE UP
LEUKOCYTE ESTERASE UR-ACNC: NEGATIVE — SIGNIFICANT CHANGE UP
MCHC RBC-ENTMCNC: 28.7 PG — SIGNIFICANT CHANGE UP (ref 27–34)
MCHC RBC-ENTMCNC: 31.4 GM/DL — LOW (ref 32–36)
MCV RBC AUTO: 91.2 FL — SIGNIFICANT CHANGE UP (ref 80–100)
NITRITE UR-MCNC: NEGATIVE — SIGNIFICANT CHANGE UP
NT-PROBNP SERPL-SCNC: 3305 PG/ML — HIGH (ref 0–300)
PH UR: 6.5 — SIGNIFICANT CHANGE UP (ref 4.8–8)
PLATELET # BLD AUTO: 178 K/UL — SIGNIFICANT CHANGE UP (ref 150–400)
POTASSIUM SERPL-MCNC: 3.7 MMOL/L — SIGNIFICANT CHANGE UP (ref 3.5–5.3)
POTASSIUM SERPL-SCNC: 3.7 MMOL/L — SIGNIFICANT CHANGE UP (ref 3.5–5.3)
PROT SERPL-MCNC: 5.7 G/DL — LOW (ref 6–8.3)
PROT UR-MCNC: NEGATIVE — SIGNIFICANT CHANGE UP
PROTHROM AB SERPL-ACNC: 15.3 SEC — HIGH (ref 10–13.1)
RBC # BLD: 3.42 M/UL — LOW (ref 4.2–5.8)
RBC # FLD: 19.1 % — HIGH (ref 10.3–14.5)
RH IG SCN BLD-IMP: NEGATIVE — SIGNIFICANT CHANGE UP
SODIUM SERPL-SCNC: 141 MMOL/L — SIGNIFICANT CHANGE UP (ref 135–145)
SP GR SPEC: 1.02 — SIGNIFICANT CHANGE UP (ref 1.01–1.02)
UROBILINOGEN FLD QL: NEGATIVE — SIGNIFICANT CHANGE UP
WBC # BLD: 5.65 K/UL — SIGNIFICANT CHANGE UP (ref 3.8–10.5)
WBC # FLD AUTO: 5.65 K/UL — SIGNIFICANT CHANGE UP (ref 3.8–10.5)

## 2017-03-14 PROCEDURE — 71010: CPT | Mod: 26

## 2017-03-14 RX ORDER — INSULIN GLARGINE 100 [IU]/ML
2 INJECTION, SOLUTION SUBCUTANEOUS AT BEDTIME
Qty: 0 | Refills: 0 | Status: DISCONTINUED | OUTPATIENT
Start: 2017-03-14 | End: 2017-03-15

## 2017-03-14 RX ORDER — VANCOMYCIN HCL 1 G
1000 VIAL (EA) INTRAVENOUS ONCE
Qty: 0 | Refills: 0 | Status: DISCONTINUED | OUTPATIENT
Start: 2017-03-14 | End: 2017-03-15

## 2017-03-14 RX ORDER — HYDROCORTISONE 20 MG
100 TABLET ORAL EVERY 8 HOURS
Qty: 0 | Refills: 0 | Status: DISCONTINUED | OUTPATIENT
Start: 2017-03-14 | End: 2017-03-15

## 2017-03-14 RX ORDER — CHLORHEXIDINE GLUCONATE 213 G/1000ML
1 SOLUTION TOPICAL
Qty: 0 | Refills: 0 | Status: DISCONTINUED | OUTPATIENT
Start: 2017-03-14 | End: 2017-03-15

## 2017-03-14 RX ADMIN — LEVALBUTEROL 0.63 MILLIGRAM(S): 1.25 SOLUTION, CONCENTRATE RESPIRATORY (INHALATION) at 23:09

## 2017-03-14 RX ADMIN — INSULIN GLARGINE 2 UNIT(S): 100 INJECTION, SOLUTION SUBCUTANEOUS at 21:52

## 2017-03-14 RX ADMIN — SODIUM CHLORIDE 3 MILLILITER(S): 9 INJECTION INTRAMUSCULAR; INTRAVENOUS; SUBCUTANEOUS at 21:47

## 2017-03-14 RX ADMIN — LEVALBUTEROL 0.63 MILLIGRAM(S): 1.25 SOLUTION, CONCENTRATE RESPIRATORY (INHALATION) at 05:57

## 2017-03-14 RX ADMIN — Medication 2 UNIT(S): at 16:50

## 2017-03-14 RX ADMIN — Medication 2: at 16:50

## 2017-03-14 RX ADMIN — CARVEDILOL PHOSPHATE 6.25 MILLIGRAM(S): 80 CAPSULE, EXTENDED RELEASE ORAL at 18:23

## 2017-03-14 RX ADMIN — Medication 0.5 MILLIGRAM(S): at 18:23

## 2017-03-14 RX ADMIN — Medication 500 MICROGRAM(S): at 12:05

## 2017-03-14 RX ADMIN — LEVALBUTEROL 0.63 MILLIGRAM(S): 1.25 SOLUTION, CONCENTRATE RESPIRATORY (INHALATION) at 00:00

## 2017-03-14 RX ADMIN — Medication 500 MICROGRAM(S): at 05:57

## 2017-03-14 RX ADMIN — OXYCODONE HYDROCHLORIDE 10 MILLIGRAM(S): 5 TABLET ORAL at 22:15

## 2017-03-14 RX ADMIN — Medication 0.5 MILLIGRAM(S): at 00:15

## 2017-03-14 RX ADMIN — SODIUM CHLORIDE 3 MILLILITER(S): 9 INJECTION INTRAMUSCULAR; INTRAVENOUS; SUBCUTANEOUS at 05:50

## 2017-03-14 RX ADMIN — Medication 0.5 MILLIGRAM(S): at 05:58

## 2017-03-14 RX ADMIN — Medication 50 MILLIGRAM(S): at 05:58

## 2017-03-14 RX ADMIN — OXYCODONE HYDROCHLORIDE 10 MILLIGRAM(S): 5 TABLET ORAL at 04:29

## 2017-03-14 RX ADMIN — CARVEDILOL PHOSPHATE 6.25 MILLIGRAM(S): 80 CAPSULE, EXTENDED RELEASE ORAL at 05:58

## 2017-03-14 RX ADMIN — OXYCODONE HYDROCHLORIDE 10 MILLIGRAM(S): 5 TABLET ORAL at 21:45

## 2017-03-14 RX ADMIN — PANTOPRAZOLE SODIUM 40 MILLIGRAM(S): 20 TABLET, DELAYED RELEASE ORAL at 05:59

## 2017-03-14 RX ADMIN — Medication 2 UNIT(S): at 08:11

## 2017-03-14 RX ADMIN — CHLORHEXIDINE GLUCONATE 1 APPLICATION(S): 213 SOLUTION TOPICAL at 21:40

## 2017-03-14 RX ADMIN — Medication 500 MICROGRAM(S): at 23:10

## 2017-03-14 RX ADMIN — LISINOPRIL 5 MILLIGRAM(S): 2.5 TABLET ORAL at 05:58

## 2017-03-14 RX ADMIN — OXYCODONE HYDROCHLORIDE 10 MILLIGRAM(S): 5 TABLET ORAL at 05:14

## 2017-03-14 RX ADMIN — HEPARIN SODIUM 1700 UNIT(S)/HR: 5000 INJECTION INTRAVENOUS; SUBCUTANEOUS at 06:57

## 2017-03-14 RX ADMIN — Medication 500 MICROGRAM(S): at 18:23

## 2017-03-14 RX ADMIN — Medication 81 MILLIGRAM(S): at 12:03

## 2017-03-14 RX ADMIN — LEVALBUTEROL 0.63 MILLIGRAM(S): 1.25 SOLUTION, CONCENTRATE RESPIRATORY (INHALATION) at 18:23

## 2017-03-14 RX ADMIN — Medication 2: at 12:02

## 2017-03-14 RX ADMIN — Medication 2 UNIT(S): at 12:03

## 2017-03-14 RX ADMIN — Medication 1: at 08:11

## 2017-03-14 RX ADMIN — Medication 500 MICROGRAM(S): at 00:00

## 2017-03-14 RX ADMIN — SODIUM CHLORIDE 3 MILLILITER(S): 9 INJECTION INTRAMUSCULAR; INTRAVENOUS; SUBCUTANEOUS at 14:37

## 2017-03-14 RX ADMIN — LEVALBUTEROL 0.63 MILLIGRAM(S): 1.25 SOLUTION, CONCENTRATE RESPIRATORY (INHALATION) at 12:43

## 2017-03-15 ENCOUNTER — APPOINTMENT (OUTPATIENT)
Dept: CARDIOTHORACIC SURGERY | Facility: HOSPITAL | Age: 67
End: 2017-03-15

## 2017-03-15 LAB
ALBUMIN SERPL ELPH-MCNC: 3.5 G/DL — SIGNIFICANT CHANGE UP (ref 3.3–5)
ALP SERPL-CCNC: 84 U/L — SIGNIFICANT CHANGE UP (ref 40–120)
ALT FLD-CCNC: 17 U/L RC — SIGNIFICANT CHANGE UP (ref 10–45)
ANION GAP SERPL CALC-SCNC: 13 MMOL/L — SIGNIFICANT CHANGE UP (ref 5–17)
APTT BLD: 27.3 SEC — LOW (ref 27.5–37.4)
APTT BLD: 95.3 SEC — HIGH (ref 27.5–37.4)
AST SERPL-CCNC: 15 U/L — SIGNIFICANT CHANGE UP (ref 10–40)
BASOPHILS # BLD AUTO: 0 K/UL — SIGNIFICANT CHANGE UP (ref 0–0.2)
BASOPHILS NFR BLD AUTO: 0 % — SIGNIFICANT CHANGE UP (ref 0–2)
BILIRUB SERPL-MCNC: 1 MG/DL — SIGNIFICANT CHANGE UP (ref 0.2–1.2)
BUN SERPL-MCNC: 13 MG/DL — SIGNIFICANT CHANGE UP (ref 7–23)
CALCIUM SERPL-MCNC: 9.5 MG/DL — SIGNIFICANT CHANGE UP (ref 8.4–10.5)
CHLORIDE SERPL-SCNC: 99 MMOL/L — SIGNIFICANT CHANGE UP (ref 96–108)
CO2 SERPL-SCNC: 27 MMOL/L — SIGNIFICANT CHANGE UP (ref 22–31)
CREAT SERPL-MCNC: 0.61 MG/DL — SIGNIFICANT CHANGE UP (ref 0.5–1.3)
EOSINOPHIL # BLD AUTO: 0 K/UL — SIGNIFICANT CHANGE UP (ref 0–0.5)
EOSINOPHIL NFR BLD AUTO: 0.2 % — SIGNIFICANT CHANGE UP (ref 0–6)
GAS PNL BLDA: SIGNIFICANT CHANGE UP
GAS PNL BLDA: SIGNIFICANT CHANGE UP
GLUCOSE SERPL-MCNC: 249 MG/DL — HIGH (ref 70–99)
HCT VFR BLD CALC: 33.5 % — LOW (ref 39–50)
HCT VFR BLD CALC: 33.8 % — LOW (ref 39–50)
HGB BLD-MCNC: 10.8 G/DL — LOW (ref 13–17)
HGB BLD-MCNC: 10.9 G/DL — LOW (ref 13–17)
INR BLD: 1.47 RATIO — HIGH (ref 0.88–1.16)
LYMPHOCYTES # BLD AUTO: 0.4 K/UL — LOW (ref 1–3.3)
LYMPHOCYTES # BLD AUTO: 5.3 % — LOW (ref 13–44)
MCHC RBC-ENTMCNC: 29.5 PG — SIGNIFICANT CHANGE UP (ref 27–34)
MCHC RBC-ENTMCNC: 29.6 PG — SIGNIFICANT CHANGE UP (ref 27–34)
MCHC RBC-ENTMCNC: 32 GM/DL — SIGNIFICANT CHANGE UP (ref 32–36)
MCHC RBC-ENTMCNC: 32.4 GM/DL — SIGNIFICANT CHANGE UP (ref 32–36)
MCV RBC AUTO: 91.2 FL — SIGNIFICANT CHANGE UP (ref 80–100)
MCV RBC AUTO: 92.4 FL — SIGNIFICANT CHANGE UP (ref 80–100)
MONOCYTES # BLD AUTO: 0.3 K/UL — SIGNIFICANT CHANGE UP (ref 0–0.9)
MONOCYTES NFR BLD AUTO: 3.5 % — SIGNIFICANT CHANGE UP (ref 2–14)
NEUTROPHILS # BLD AUTO: 6.8 K/UL — SIGNIFICANT CHANGE UP (ref 1.8–7.4)
NEUTROPHILS NFR BLD AUTO: 91 % — HIGH (ref 43–77)
PLATELET # BLD AUTO: 130 K/UL — LOW (ref 150–400)
PLATELET # BLD AUTO: 147 K/UL — LOW (ref 150–400)
POTASSIUM SERPL-MCNC: 4.1 MMOL/L — SIGNIFICANT CHANGE UP (ref 3.5–5.3)
POTASSIUM SERPL-SCNC: 4.1 MMOL/L — SIGNIFICANT CHANGE UP (ref 3.5–5.3)
PROT SERPL-MCNC: 5.9 G/DL — LOW (ref 6–8.3)
PROTHROM AB SERPL-ACNC: 15.9 SEC — HIGH (ref 10–13.1)
RBC # BLD: 3.66 M/UL — LOW (ref 4.2–5.8)
RBC # BLD: 3.68 M/UL — LOW (ref 4.2–5.8)
RBC # FLD: 18.6 % — HIGH (ref 10.3–14.5)
RBC # FLD: 19.1 % — HIGH (ref 10.3–14.5)
SODIUM SERPL-SCNC: 139 MMOL/L — SIGNIFICANT CHANGE UP (ref 135–145)
WBC # BLD: 6.7 K/UL — SIGNIFICANT CHANGE UP (ref 3.8–10.5)
WBC # BLD: 7.4 K/UL — SIGNIFICANT CHANGE UP (ref 3.8–10.5)
WBC # FLD AUTO: 6.7 K/UL — SIGNIFICANT CHANGE UP (ref 3.8–10.5)
WBC # FLD AUTO: 7.4 K/UL — SIGNIFICANT CHANGE UP (ref 3.8–10.5)

## 2017-03-15 PROCEDURE — 93010 ELECTROCARDIOGRAM REPORT: CPT

## 2017-03-15 PROCEDURE — 33361 REPLACE AORTIC VALVE PERQ: CPT | Mod: 62,Q0

## 2017-03-15 PROCEDURE — 93355 ECHO TRANSESOPHAGEAL (TEE): CPT

## 2017-03-15 RX ORDER — SODIUM CHLORIDE 9 MG/ML
1000 INJECTION, SOLUTION INTRAVENOUS
Qty: 0 | Refills: 0 | Status: DISCONTINUED | OUTPATIENT
Start: 2017-03-15 | End: 2017-03-15

## 2017-03-15 RX ORDER — HYDRALAZINE HCL 50 MG
10 TABLET ORAL ONCE
Qty: 0 | Refills: 0 | Status: COMPLETED | OUTPATIENT
Start: 2017-03-15 | End: 2017-03-15

## 2017-03-15 RX ORDER — CHLORHEXIDINE GLUCONATE 213 G/1000ML
5 SOLUTION TOPICAL EVERY 4 HOURS
Qty: 0 | Refills: 0 | Status: DISCONTINUED | OUTPATIENT
Start: 2017-03-15 | End: 2017-03-15

## 2017-03-15 RX ORDER — DOCUSATE SODIUM 100 MG
100 CAPSULE ORAL THREE TIMES A DAY
Qty: 0 | Refills: 0 | Status: DISCONTINUED | OUTPATIENT
Start: 2017-03-15 | End: 2017-03-15

## 2017-03-15 RX ORDER — WARFARIN SODIUM 2.5 MG/1
5 TABLET ORAL ONCE
Qty: 0 | Refills: 0 | Status: COMPLETED | OUTPATIENT
Start: 2017-03-15 | End: 2017-03-15

## 2017-03-15 RX ORDER — POTASSIUM CHLORIDE 20 MEQ
10 PACKET (EA) ORAL
Qty: 0 | Refills: 0 | Status: DISCONTINUED | OUTPATIENT
Start: 2017-03-15 | End: 2017-03-15

## 2017-03-15 RX ORDER — DOCUSATE SODIUM 100 MG
100 CAPSULE ORAL THREE TIMES A DAY
Qty: 0 | Refills: 0 | Status: DISCONTINUED | OUTPATIENT
Start: 2017-03-15 | End: 2017-03-20

## 2017-03-15 RX ORDER — VANCOMYCIN HCL 1 G
1000 VIAL (EA) INTRAVENOUS EVERY 12 HOURS
Qty: 0 | Refills: 0 | Status: COMPLETED | OUTPATIENT
Start: 2017-03-15 | End: 2017-03-16

## 2017-03-15 RX ORDER — IPRATROPIUM/ALBUTEROL SULFATE 18-103MCG
3 AEROSOL WITH ADAPTER (GRAM) INHALATION EVERY 6 HOURS
Qty: 0 | Refills: 0 | Status: DISCONTINUED | OUTPATIENT
Start: 2017-03-15 | End: 2017-03-17

## 2017-03-15 RX ORDER — INSULIN HUMAN 100 [IU]/ML
10 INJECTION, SOLUTION SUBCUTANEOUS ONCE
Qty: 0 | Refills: 0 | Status: COMPLETED | OUTPATIENT
Start: 2017-03-15 | End: 2017-03-15

## 2017-03-15 RX ORDER — FAMOTIDINE 10 MG/ML
20 INJECTION INTRAVENOUS EVERY 12 HOURS
Qty: 0 | Refills: 0 | Status: DISCONTINUED | OUTPATIENT
Start: 2017-03-15 | End: 2017-03-15

## 2017-03-15 RX ORDER — FAMOTIDINE 10 MG/ML
20 INJECTION INTRAVENOUS DAILY
Qty: 0 | Refills: 0 | Status: DISCONTINUED | OUTPATIENT
Start: 2017-03-15 | End: 2017-03-20

## 2017-03-15 RX ORDER — ASPIRIN/CALCIUM CARB/MAGNESIUM 324 MG
325 TABLET ORAL DAILY
Qty: 0 | Refills: 0 | Status: DISCONTINUED | OUTPATIENT
Start: 2017-03-15 | End: 2017-03-15

## 2017-03-15 RX ORDER — POTASSIUM CHLORIDE 20 MEQ
10 PACKET (EA) ORAL ONCE
Qty: 0 | Refills: 0 | Status: DISCONTINUED | OUTPATIENT
Start: 2017-03-15 | End: 2017-03-15

## 2017-03-15 RX ORDER — INSULIN LISPRO 100/ML
VIAL (ML) SUBCUTANEOUS
Qty: 0 | Refills: 0 | Status: DISCONTINUED | OUTPATIENT
Start: 2017-03-15 | End: 2017-03-16

## 2017-03-15 RX ORDER — BUDESONIDE, MICRONIZED 100 %
0.5 POWDER (GRAM) MISCELLANEOUS EVERY 12 HOURS
Qty: 0 | Refills: 0 | Status: DISCONTINUED | OUTPATIENT
Start: 2017-03-15 | End: 2017-03-17

## 2017-03-15 RX ORDER — MEPERIDINE HYDROCHLORIDE 50 MG/ML
25 INJECTION INTRAMUSCULAR; INTRAVENOUS; SUBCUTANEOUS ONCE
Qty: 0 | Refills: 0 | Status: DISCONTINUED | OUTPATIENT
Start: 2017-03-15 | End: 2017-03-15

## 2017-03-15 RX ORDER — ASPIRIN/CALCIUM CARB/MAGNESIUM 324 MG
81 TABLET ORAL DAILY
Qty: 0 | Refills: 0 | Status: DISCONTINUED | OUTPATIENT
Start: 2017-03-15 | End: 2017-03-20

## 2017-03-15 RX ADMIN — INSULIN HUMAN 10 UNIT(S): 100 INJECTION, SOLUTION SUBCUTANEOUS at 12:42

## 2017-03-15 RX ADMIN — Medication 10 MILLIGRAM(S): at 15:58

## 2017-03-15 RX ADMIN — LISINOPRIL 5 MILLIGRAM(S): 2.5 TABLET ORAL at 05:19

## 2017-03-15 RX ADMIN — LEVALBUTEROL 0.63 MILLIGRAM(S): 1.25 SOLUTION, CONCENTRATE RESPIRATORY (INHALATION) at 05:21

## 2017-03-15 RX ADMIN — CARVEDILOL PHOSPHATE 6.25 MILLIGRAM(S): 80 CAPSULE, EXTENDED RELEASE ORAL at 05:19

## 2017-03-15 RX ADMIN — Medication 500 MICROGRAM(S): at 05:21

## 2017-03-15 RX ADMIN — Medication 3 MILLILITER(S): at 17:43

## 2017-03-15 RX ADMIN — Medication 81 MILLIGRAM(S): at 17:46

## 2017-03-15 RX ADMIN — Medication 50 MILLIGRAM(S): at 05:19

## 2017-03-15 RX ADMIN — SODIUM CHLORIDE 3 MILLILITER(S): 9 INJECTION INTRAMUSCULAR; INTRAVENOUS; SUBCUTANEOUS at 05:23

## 2017-03-15 RX ADMIN — CHLORHEXIDINE GLUCONATE 1 APPLICATION(S): 213 SOLUTION TOPICAL at 06:10

## 2017-03-15 RX ADMIN — Medication 8: at 21:30

## 2017-03-15 RX ADMIN — WARFARIN SODIUM 5 MILLIGRAM(S): 2.5 TABLET ORAL at 21:21

## 2017-03-15 RX ADMIN — Medication 40 MILLIGRAM(S): at 13:31

## 2017-03-15 RX ADMIN — PANTOPRAZOLE SODIUM 40 MILLIGRAM(S): 20 TABLET, DELAYED RELEASE ORAL at 05:19

## 2017-03-15 RX ADMIN — Medication 0.5 MILLIGRAM(S): at 05:20

## 2017-03-15 RX ADMIN — Medication 0.5 MILLIGRAM(S): at 17:43

## 2017-03-15 RX ADMIN — Medication 250 MILLIGRAM(S): at 21:20

## 2017-03-16 LAB
ALBUMIN SERPL ELPH-MCNC: 3.5 G/DL — SIGNIFICANT CHANGE UP (ref 3.3–5)
ALP SERPL-CCNC: 86 U/L — SIGNIFICANT CHANGE UP (ref 40–120)
ALT FLD-CCNC: 16 U/L RC — SIGNIFICANT CHANGE UP (ref 10–45)
ANION GAP SERPL CALC-SCNC: 13 MMOL/L — SIGNIFICANT CHANGE UP (ref 5–17)
APTT BLD: 44.2 SEC — HIGH (ref 27.5–37.4)
AST SERPL-CCNC: 11 U/L — SIGNIFICANT CHANGE UP (ref 10–40)
BILIRUB SERPL-MCNC: 0.7 MG/DL — SIGNIFICANT CHANGE UP (ref 0.2–1.2)
BUN SERPL-MCNC: 12 MG/DL — SIGNIFICANT CHANGE UP (ref 7–23)
CALCIUM SERPL-MCNC: 9.2 MG/DL — SIGNIFICANT CHANGE UP (ref 8.4–10.5)
CHLORIDE SERPL-SCNC: 99 MMOL/L — SIGNIFICANT CHANGE UP (ref 96–108)
CO2 SERPL-SCNC: 26 MMOL/L — SIGNIFICANT CHANGE UP (ref 22–31)
CREAT SERPL-MCNC: 0.61 MG/DL — SIGNIFICANT CHANGE UP (ref 0.5–1.3)
GLUCOSE SERPL-MCNC: 157 MG/DL — HIGH (ref 70–99)
HCT VFR BLD CALC: 33.9 % — LOW (ref 39–50)
HGB BLD-MCNC: 11 G/DL — LOW (ref 13–17)
INR BLD: 2.97 RATIO — HIGH (ref 0.88–1.16)
MCHC RBC-ENTMCNC: 29.9 PG — SIGNIFICANT CHANGE UP (ref 27–34)
MCHC RBC-ENTMCNC: 32.4 GM/DL — SIGNIFICANT CHANGE UP (ref 32–36)
MCV RBC AUTO: 92.1 FL — SIGNIFICANT CHANGE UP (ref 80–100)
PLATELET # BLD AUTO: 154 K/UL — SIGNIFICANT CHANGE UP (ref 150–400)
POTASSIUM SERPL-MCNC: 4.3 MMOL/L — SIGNIFICANT CHANGE UP (ref 3.5–5.3)
POTASSIUM SERPL-SCNC: 4.3 MMOL/L — SIGNIFICANT CHANGE UP (ref 3.5–5.3)
PROT SERPL-MCNC: 6 G/DL — SIGNIFICANT CHANGE UP (ref 6–8.3)
PROTHROM AB SERPL-ACNC: 32.7 SEC — HIGH (ref 10–13.1)
RBC # BLD: 3.69 M/UL — LOW (ref 4.2–5.8)
RBC # FLD: 18.5 % — HIGH (ref 10.3–14.5)
SODIUM SERPL-SCNC: 138 MMOL/L — SIGNIFICANT CHANGE UP (ref 135–145)
WBC # BLD: 10.2 K/UL — SIGNIFICANT CHANGE UP (ref 3.8–10.5)
WBC # FLD AUTO: 10.2 K/UL — SIGNIFICANT CHANGE UP (ref 3.8–10.5)

## 2017-03-16 PROCEDURE — 71010: CPT | Mod: 26

## 2017-03-16 PROCEDURE — 93010 ELECTROCARDIOGRAM REPORT: CPT

## 2017-03-16 PROCEDURE — 32120 RE-EXPLORATION OF CHEST: CPT

## 2017-03-16 PROCEDURE — 93306 TTE W/DOPPLER COMPLETE: CPT | Mod: 26

## 2017-03-16 RX ORDER — INSULIN LISPRO 100/ML
VIAL (ML) SUBCUTANEOUS
Qty: 0 | Refills: 0 | Status: DISCONTINUED | OUTPATIENT
Start: 2017-03-16 | End: 2017-03-19

## 2017-03-16 RX ORDER — ATORVASTATIN CALCIUM 80 MG/1
40 TABLET, FILM COATED ORAL AT BEDTIME
Qty: 0 | Refills: 0 | Status: DISCONTINUED | OUTPATIENT
Start: 2017-03-16 | End: 2017-03-20

## 2017-03-16 RX ORDER — TAMSULOSIN HYDROCHLORIDE 0.4 MG/1
0.4 CAPSULE ORAL AT BEDTIME
Qty: 0 | Refills: 0 | Status: DISCONTINUED | OUTPATIENT
Start: 2017-03-16 | End: 2017-03-20

## 2017-03-16 RX ORDER — CHOLECALCIFEROL (VITAMIN D3) 125 MCG
4000 CAPSULE ORAL DAILY
Qty: 0 | Refills: 0 | Status: DISCONTINUED | OUTPATIENT
Start: 2017-03-16 | End: 2017-03-20

## 2017-03-16 RX ORDER — WARFARIN SODIUM 2.5 MG/1
5 TABLET ORAL ONCE
Qty: 0 | Refills: 0 | Status: COMPLETED | OUTPATIENT
Start: 2017-03-16 | End: 2017-03-16

## 2017-03-16 RX ORDER — SODIUM CHLORIDE 9 MG/ML
3 INJECTION INTRAMUSCULAR; INTRAVENOUS; SUBCUTANEOUS EVERY 8 HOURS
Qty: 0 | Refills: 0 | Status: DISCONTINUED | OUTPATIENT
Start: 2017-03-16 | End: 2017-03-20

## 2017-03-16 RX ORDER — INSULIN LISPRO 100/ML
3 VIAL (ML) SUBCUTANEOUS
Qty: 0 | Refills: 0 | Status: DISCONTINUED | OUTPATIENT
Start: 2017-03-16 | End: 2017-03-19

## 2017-03-16 RX ORDER — HYDROMORPHONE HYDROCHLORIDE 2 MG/ML
2 INJECTION INTRAMUSCULAR; INTRAVENOUS; SUBCUTANEOUS
Qty: 0 | Refills: 0 | Status: DISCONTINUED | OUTPATIENT
Start: 2017-03-16 | End: 2017-03-20

## 2017-03-16 RX ORDER — INSULIN LISPRO 100/ML
VIAL (ML) SUBCUTANEOUS AT BEDTIME
Qty: 0 | Refills: 0 | Status: DISCONTINUED | OUTPATIENT
Start: 2017-03-16 | End: 2017-03-19

## 2017-03-16 RX ADMIN — Medication 5: at 09:22

## 2017-03-16 RX ADMIN — Medication 3 MILLILITER(S): at 01:11

## 2017-03-16 RX ADMIN — Medication 0.5 MILLIGRAM(S): at 17:33

## 2017-03-16 RX ADMIN — SODIUM CHLORIDE 3 MILLILITER(S): 9 INJECTION INTRAMUSCULAR; INTRAVENOUS; SUBCUTANEOUS at 13:35

## 2017-03-16 RX ADMIN — HYDROMORPHONE HYDROCHLORIDE 2 MILLIGRAM(S): 2 INJECTION INTRAMUSCULAR; INTRAVENOUS; SUBCUTANEOUS at 21:17

## 2017-03-16 RX ADMIN — Medication 40 MILLIGRAM(S): at 06:43

## 2017-03-16 RX ADMIN — Medication 100 MILLIGRAM(S): at 22:01

## 2017-03-16 RX ADMIN — FAMOTIDINE 20 MILLIGRAM(S): 10 INJECTION INTRAVENOUS at 12:19

## 2017-03-16 RX ADMIN — Medication 0.5 MILLIGRAM(S): at 05:17

## 2017-03-16 RX ADMIN — TAMSULOSIN HYDROCHLORIDE 0.4 MILLIGRAM(S): 0.4 CAPSULE ORAL at 17:33

## 2017-03-16 RX ADMIN — Medication 3 MILLILITER(S): at 17:33

## 2017-03-16 RX ADMIN — TAMSULOSIN HYDROCHLORIDE 0.4 MILLIGRAM(S): 0.4 CAPSULE ORAL at 22:01

## 2017-03-16 RX ADMIN — Medication 250 MILLIGRAM(S): at 09:17

## 2017-03-16 RX ADMIN — HYDROMORPHONE HYDROCHLORIDE 2 MILLIGRAM(S): 2 INJECTION INTRAMUSCULAR; INTRAVENOUS; SUBCUTANEOUS at 21:47

## 2017-03-16 RX ADMIN — SODIUM CHLORIDE 3 MILLILITER(S): 9 INJECTION INTRAMUSCULAR; INTRAVENOUS; SUBCUTANEOUS at 22:03

## 2017-03-16 RX ADMIN — Medication 3 MILLILITER(S): at 05:17

## 2017-03-16 RX ADMIN — Medication 100 MILLIGRAM(S): at 13:48

## 2017-03-16 RX ADMIN — ATORVASTATIN CALCIUM 40 MILLIGRAM(S): 80 TABLET, FILM COATED ORAL at 22:01

## 2017-03-16 RX ADMIN — WARFARIN SODIUM 5 MILLIGRAM(S): 2.5 TABLET ORAL at 22:01

## 2017-03-16 RX ADMIN — Medication 81 MILLIGRAM(S): at 12:19

## 2017-03-16 RX ADMIN — Medication 4000 UNIT(S): at 13:52

## 2017-03-16 RX ADMIN — Medication 3 MILLILITER(S): at 11:06

## 2017-03-16 RX ADMIN — Medication 100 MILLIGRAM(S): at 06:43

## 2017-03-16 RX ADMIN — Medication 5: at 13:48

## 2017-03-17 LAB
ALBUMIN SERPL ELPH-MCNC: 3.3 G/DL — SIGNIFICANT CHANGE UP (ref 3.3–5)
ALP SERPL-CCNC: 79 U/L — SIGNIFICANT CHANGE UP (ref 40–120)
ALT FLD-CCNC: 12 U/L RC — SIGNIFICANT CHANGE UP (ref 10–45)
ANION GAP SERPL CALC-SCNC: 10 MMOL/L — SIGNIFICANT CHANGE UP (ref 5–17)
AST SERPL-CCNC: 9 U/L — LOW (ref 10–40)
BILIRUB SERPL-MCNC: 0.7 MG/DL — SIGNIFICANT CHANGE UP (ref 0.2–1.2)
BUN SERPL-MCNC: 15 MG/DL — SIGNIFICANT CHANGE UP (ref 7–23)
CALCIUM SERPL-MCNC: 8.9 MG/DL — SIGNIFICANT CHANGE UP (ref 8.4–10.5)
CHLORIDE SERPL-SCNC: 99 MMOL/L — SIGNIFICANT CHANGE UP (ref 96–108)
CO2 SERPL-SCNC: 31 MMOL/L — SIGNIFICANT CHANGE UP (ref 22–31)
CREAT SERPL-MCNC: 0.49 MG/DL — LOW (ref 0.5–1.3)
GLUCOSE SERPL-MCNC: 143 MG/DL — HIGH (ref 70–99)
HCT VFR BLD CALC: 31 % — LOW (ref 39–50)
HGB BLD-MCNC: 10.3 G/DL — LOW (ref 13–17)
INR BLD: 1.52 RATIO — HIGH (ref 0.88–1.16)
MCHC RBC-ENTMCNC: 30.5 PG — SIGNIFICANT CHANGE UP (ref 27–34)
MCHC RBC-ENTMCNC: 33.3 GM/DL — SIGNIFICANT CHANGE UP (ref 32–36)
MCV RBC AUTO: 91.6 FL — SIGNIFICANT CHANGE UP (ref 80–100)
PLATELET # BLD AUTO: 114 K/UL — LOW (ref 150–400)
POTASSIUM SERPL-MCNC: 4.1 MMOL/L — SIGNIFICANT CHANGE UP (ref 3.5–5.3)
POTASSIUM SERPL-SCNC: 4.1 MMOL/L — SIGNIFICANT CHANGE UP (ref 3.5–5.3)
PROT SERPL-MCNC: 5.7 G/DL — LOW (ref 6–8.3)
PROTHROM AB SERPL-ACNC: 16.6 SEC — HIGH (ref 10–13.1)
RBC # BLD: 3.39 M/UL — LOW (ref 4.2–5.8)
RBC # FLD: 18.3 % — HIGH (ref 10.3–14.5)
SODIUM SERPL-SCNC: 140 MMOL/L — SIGNIFICANT CHANGE UP (ref 135–145)
WBC # BLD: 8.5 K/UL — SIGNIFICANT CHANGE UP (ref 3.8–10.5)
WBC # FLD AUTO: 8.5 K/UL — SIGNIFICANT CHANGE UP (ref 3.8–10.5)

## 2017-03-17 PROCEDURE — 99231 SBSQ HOSP IP/OBS SF/LOW 25: CPT

## 2017-03-17 PROCEDURE — 71010: CPT | Mod: 26

## 2017-03-17 RX ORDER — WARFARIN SODIUM 2.5 MG/1
6 TABLET ORAL ONCE
Qty: 0 | Refills: 0 | Status: COMPLETED | OUTPATIENT
Start: 2017-03-17 | End: 2017-03-17

## 2017-03-17 RX ORDER — FLUTICASONE FUROATE AND VILANTEROL TRIFENATATE 100; 25 UG/1; UG/1
1 POWDER RESPIRATORY (INHALATION) DAILY
Qty: 0 | Refills: 0 | Status: DISCONTINUED | OUTPATIENT
Start: 2017-03-18 | End: 2017-03-20

## 2017-03-17 RX ADMIN — HYDROMORPHONE HYDROCHLORIDE 2 MILLIGRAM(S): 2 INJECTION INTRAMUSCULAR; INTRAVENOUS; SUBCUTANEOUS at 03:09

## 2017-03-17 RX ADMIN — Medication 40 MILLIGRAM(S): at 05:38

## 2017-03-17 RX ADMIN — Medication 4000 UNIT(S): at 11:08

## 2017-03-17 RX ADMIN — FAMOTIDINE 20 MILLIGRAM(S): 10 INJECTION INTRAVENOUS at 11:08

## 2017-03-17 RX ADMIN — SODIUM CHLORIDE 3 MILLILITER(S): 9 INJECTION INTRAMUSCULAR; INTRAVENOUS; SUBCUTANEOUS at 22:15

## 2017-03-17 RX ADMIN — Medication 3 MILLILITER(S): at 05:38

## 2017-03-17 RX ADMIN — SODIUM CHLORIDE 3 MILLILITER(S): 9 INJECTION INTRAMUSCULAR; INTRAVENOUS; SUBCUTANEOUS at 05:45

## 2017-03-17 RX ADMIN — WARFARIN SODIUM 6 MILLIGRAM(S): 2.5 TABLET ORAL at 22:12

## 2017-03-17 RX ADMIN — Medication 3 UNIT(S): at 16:43

## 2017-03-17 RX ADMIN — HYDROMORPHONE HYDROCHLORIDE 2 MILLIGRAM(S): 2 INJECTION INTRAMUSCULAR; INTRAVENOUS; SUBCUTANEOUS at 22:12

## 2017-03-17 RX ADMIN — Medication 2: at 16:43

## 2017-03-17 RX ADMIN — Medication 4: at 11:04

## 2017-03-17 RX ADMIN — HYDROMORPHONE HYDROCHLORIDE 2 MILLIGRAM(S): 2 INJECTION INTRAMUSCULAR; INTRAVENOUS; SUBCUTANEOUS at 03:39

## 2017-03-17 RX ADMIN — Medication 0.5 MILLIGRAM(S): at 05:38

## 2017-03-17 RX ADMIN — ATORVASTATIN CALCIUM 40 MILLIGRAM(S): 80 TABLET, FILM COATED ORAL at 22:12

## 2017-03-17 RX ADMIN — Medication 100 MILLIGRAM(S): at 05:38

## 2017-03-17 RX ADMIN — SODIUM CHLORIDE 3 MILLILITER(S): 9 INJECTION INTRAMUSCULAR; INTRAVENOUS; SUBCUTANEOUS at 14:35

## 2017-03-17 RX ADMIN — TAMSULOSIN HYDROCHLORIDE 0.4 MILLIGRAM(S): 0.4 CAPSULE ORAL at 22:12

## 2017-03-17 RX ADMIN — Medication 3 UNIT(S): at 07:55

## 2017-03-17 RX ADMIN — Medication 1: at 07:55

## 2017-03-17 RX ADMIN — Medication 100 MILLIGRAM(S): at 22:12

## 2017-03-17 RX ADMIN — Medication 3 UNIT(S): at 11:05

## 2017-03-17 RX ADMIN — Medication 81 MILLIGRAM(S): at 11:07

## 2017-03-17 RX ADMIN — Medication 30 MILLIGRAM(S): at 11:10

## 2017-03-17 RX ADMIN — HYDROMORPHONE HYDROCHLORIDE 2 MILLIGRAM(S): 2 INJECTION INTRAMUSCULAR; INTRAVENOUS; SUBCUTANEOUS at 23:00

## 2017-03-17 NOTE — PHYSICAL THERAPY INITIAL EVALUATION ADULT - ADDITIONAL COMMENTS
Pt lives in a pvt house w/ wife. Pt w/ 3 steps to enter and 1 flt to bedroom. PTA indep w/o an assist device for all ADLs

## 2017-03-17 NOTE — PHYSICAL THERAPY INITIAL EVALUATION ADULT - PERTINENT HX OF CURRENT PROBLEM, REHAB EVAL
67 year old male with PMH of AI, CAD s/p AVR/CABG with Dr. Adame in 2011, multiple myeloma, colon cancer s/p resection, DVT and B/L PE 1/20/2017, DM2, HTN, HLD, MR, CVA, COPD who was admitted to the CTU from the cardiothoracic office at [facility] complaining of worsening SOB for 3 days.

## 2017-03-18 LAB
ANION GAP SERPL CALC-SCNC: 14 MMOL/L — SIGNIFICANT CHANGE UP (ref 5–17)
BUN SERPL-MCNC: 15 MG/DL — SIGNIFICANT CHANGE UP (ref 7–23)
CALCIUM SERPL-MCNC: 9 MG/DL — SIGNIFICANT CHANGE UP (ref 8.4–10.5)
CHLORIDE SERPL-SCNC: 97 MMOL/L — SIGNIFICANT CHANGE UP (ref 96–108)
CO2 SERPL-SCNC: 26 MMOL/L — SIGNIFICANT CHANGE UP (ref 22–31)
CREAT SERPL-MCNC: 0.5 MG/DL — SIGNIFICANT CHANGE UP (ref 0.5–1.3)
GLUCOSE SERPL-MCNC: 162 MG/DL — HIGH (ref 70–99)
HCT VFR BLD CALC: 31.4 % — LOW (ref 39–50)
HGB BLD-MCNC: 10.4 G/DL — LOW (ref 13–17)
INR BLD: 1.51 RATIO — HIGH (ref 0.88–1.16)
MCHC RBC-ENTMCNC: 30.7 PG — SIGNIFICANT CHANGE UP (ref 27–34)
MCHC RBC-ENTMCNC: 33.2 GM/DL — SIGNIFICANT CHANGE UP (ref 32–36)
MCV RBC AUTO: 92.4 FL — SIGNIFICANT CHANGE UP (ref 80–100)
PLATELET # BLD AUTO: 121 K/UL — LOW (ref 150–400)
POTASSIUM SERPL-MCNC: 3.9 MMOL/L — SIGNIFICANT CHANGE UP (ref 3.5–5.3)
POTASSIUM SERPL-SCNC: 3.9 MMOL/L — SIGNIFICANT CHANGE UP (ref 3.5–5.3)
PROTHROM AB SERPL-ACNC: 16.4 SEC — HIGH (ref 10–13.1)
RBC # BLD: 3.39 M/UL — LOW (ref 4.2–5.8)
RBC # FLD: 18.3 % — HIGH (ref 10.3–14.5)
SODIUM SERPL-SCNC: 137 MMOL/L — SIGNIFICANT CHANGE UP (ref 135–145)
WBC # BLD: 8 K/UL — SIGNIFICANT CHANGE UP (ref 3.8–10.5)
WBC # FLD AUTO: 8 K/UL — SIGNIFICANT CHANGE UP (ref 3.8–10.5)

## 2017-03-18 RX ORDER — WARFARIN SODIUM 2.5 MG/1
7.5 TABLET ORAL ONCE
Qty: 0 | Refills: 0 | Status: COMPLETED | OUTPATIENT
Start: 2017-03-18 | End: 2017-03-18

## 2017-03-18 RX ORDER — POTASSIUM CHLORIDE 20 MEQ
20 PACKET (EA) ORAL ONCE
Qty: 0 | Refills: 0 | Status: COMPLETED | OUTPATIENT
Start: 2017-03-18 | End: 2017-03-18

## 2017-03-18 RX ADMIN — HYDROMORPHONE HYDROCHLORIDE 2 MILLIGRAM(S): 2 INJECTION INTRAMUSCULAR; INTRAVENOUS; SUBCUTANEOUS at 22:00

## 2017-03-18 RX ADMIN — Medication 2: at 11:58

## 2017-03-18 RX ADMIN — Medication 100 MILLIGRAM(S): at 05:27

## 2017-03-18 RX ADMIN — Medication 30 MILLIGRAM(S): at 05:26

## 2017-03-18 RX ADMIN — FAMOTIDINE 20 MILLIGRAM(S): 10 INJECTION INTRAVENOUS at 11:59

## 2017-03-18 RX ADMIN — HYDROMORPHONE HYDROCHLORIDE 2 MILLIGRAM(S): 2 INJECTION INTRAMUSCULAR; INTRAVENOUS; SUBCUTANEOUS at 04:10

## 2017-03-18 RX ADMIN — Medication 20 MILLIEQUIVALENT(S): at 11:59

## 2017-03-18 RX ADMIN — Medication 4000 UNIT(S): at 11:59

## 2017-03-18 RX ADMIN — SODIUM CHLORIDE 3 MILLILITER(S): 9 INJECTION INTRAMUSCULAR; INTRAVENOUS; SUBCUTANEOUS at 13:06

## 2017-03-18 RX ADMIN — Medication 3 UNIT(S): at 16:51

## 2017-03-18 RX ADMIN — FLUTICASONE FUROATE AND VILANTEROL TRIFENATATE 1 PUFF(S): 100; 25 POWDER RESPIRATORY (INHALATION) at 12:00

## 2017-03-18 RX ADMIN — Medication 3 UNIT(S): at 11:59

## 2017-03-18 RX ADMIN — HYDROMORPHONE HYDROCHLORIDE 2 MILLIGRAM(S): 2 INJECTION INTRAMUSCULAR; INTRAVENOUS; SUBCUTANEOUS at 21:16

## 2017-03-18 RX ADMIN — SODIUM CHLORIDE 3 MILLILITER(S): 9 INJECTION INTRAMUSCULAR; INTRAVENOUS; SUBCUTANEOUS at 05:26

## 2017-03-18 RX ADMIN — Medication 100 MILLIGRAM(S): at 13:06

## 2017-03-18 RX ADMIN — ATORVASTATIN CALCIUM 40 MILLIGRAM(S): 80 TABLET, FILM COATED ORAL at 21:11

## 2017-03-18 RX ADMIN — Medication 81 MILLIGRAM(S): at 11:59

## 2017-03-18 RX ADMIN — Medication 3 UNIT(S): at 07:43

## 2017-03-18 RX ADMIN — WARFARIN SODIUM 7.5 MILLIGRAM(S): 2.5 TABLET ORAL at 21:12

## 2017-03-18 RX ADMIN — TAMSULOSIN HYDROCHLORIDE 0.4 MILLIGRAM(S): 0.4 CAPSULE ORAL at 21:12

## 2017-03-18 RX ADMIN — Medication 100 MILLIGRAM(S): at 21:12

## 2017-03-18 RX ADMIN — HYDROMORPHONE HYDROCHLORIDE 2 MILLIGRAM(S): 2 INJECTION INTRAMUSCULAR; INTRAVENOUS; SUBCUTANEOUS at 05:00

## 2017-03-18 RX ADMIN — Medication 1: at 07:43

## 2017-03-18 RX ADMIN — Medication 2: at 16:51

## 2017-03-18 RX ADMIN — SODIUM CHLORIDE 3 MILLILITER(S): 9 INJECTION INTRAMUSCULAR; INTRAVENOUS; SUBCUTANEOUS at 21:17

## 2017-03-19 LAB
ANION GAP SERPL CALC-SCNC: 15 MMOL/L — SIGNIFICANT CHANGE UP (ref 5–17)
BUN SERPL-MCNC: 14 MG/DL — SIGNIFICANT CHANGE UP (ref 7–23)
CALCIUM SERPL-MCNC: 9 MG/DL — SIGNIFICANT CHANGE UP (ref 8.4–10.5)
CHLORIDE SERPL-SCNC: 97 MMOL/L — SIGNIFICANT CHANGE UP (ref 96–108)
CO2 SERPL-SCNC: 25 MMOL/L — SIGNIFICANT CHANGE UP (ref 22–31)
CREAT SERPL-MCNC: 0.51 MG/DL — SIGNIFICANT CHANGE UP (ref 0.5–1.3)
GLUCOSE SERPL-MCNC: 142 MG/DL — HIGH (ref 70–99)
HCT VFR BLD CALC: 34.9 % — LOW (ref 39–50)
HGB BLD-MCNC: 11.4 G/DL — LOW (ref 13–17)
INR BLD: 1.68 RATIO — HIGH (ref 0.88–1.16)
MCHC RBC-ENTMCNC: 30.1 PG — SIGNIFICANT CHANGE UP (ref 27–34)
MCHC RBC-ENTMCNC: 32.7 GM/DL — SIGNIFICANT CHANGE UP (ref 32–36)
MCV RBC AUTO: 92 FL — SIGNIFICANT CHANGE UP (ref 80–100)
PLATELET # BLD AUTO: 112 K/UL — LOW (ref 150–400)
POTASSIUM SERPL-MCNC: 4.2 MMOL/L — SIGNIFICANT CHANGE UP (ref 3.5–5.3)
POTASSIUM SERPL-SCNC: 4.2 MMOL/L — SIGNIFICANT CHANGE UP (ref 3.5–5.3)
PROTHROM AB SERPL-ACNC: 18.4 SEC — HIGH (ref 10–13.1)
RBC # BLD: 3.79 M/UL — LOW (ref 4.2–5.8)
RBC # FLD: 18.2 % — HIGH (ref 10.3–14.5)
SODIUM SERPL-SCNC: 137 MMOL/L — SIGNIFICANT CHANGE UP (ref 135–145)
WBC # BLD: 7 K/UL — SIGNIFICANT CHANGE UP (ref 3.8–10.5)
WBC # FLD AUTO: 7 K/UL — SIGNIFICANT CHANGE UP (ref 3.8–10.5)

## 2017-03-19 RX ORDER — INSULIN LISPRO 100/ML
VIAL (ML) SUBCUTANEOUS
Qty: 0 | Refills: 0 | Status: DISCONTINUED | OUTPATIENT
Start: 2017-03-19 | End: 2017-03-20

## 2017-03-19 RX ORDER — INSULIN LISPRO 100/ML
VIAL (ML) SUBCUTANEOUS AT BEDTIME
Qty: 0 | Refills: 0 | Status: DISCONTINUED | OUTPATIENT
Start: 2017-03-19 | End: 2017-03-20

## 2017-03-19 RX ORDER — WARFARIN SODIUM 2.5 MG/1
7.5 TABLET ORAL ONCE
Qty: 0 | Refills: 0 | Status: COMPLETED | OUTPATIENT
Start: 2017-03-19 | End: 2017-03-19

## 2017-03-19 RX ADMIN — Medication 81 MILLIGRAM(S): at 11:50

## 2017-03-19 RX ADMIN — Medication 100 MILLIGRAM(S): at 11:50

## 2017-03-19 RX ADMIN — Medication 3 UNIT(S): at 07:59

## 2017-03-19 RX ADMIN — HYDROMORPHONE HYDROCHLORIDE 2 MILLIGRAM(S): 2 INJECTION INTRAMUSCULAR; INTRAVENOUS; SUBCUTANEOUS at 16:30

## 2017-03-19 RX ADMIN — Medication 3 UNIT(S): at 11:49

## 2017-03-19 RX ADMIN — Medication 4000 UNIT(S): at 11:49

## 2017-03-19 RX ADMIN — SODIUM CHLORIDE 3 MILLILITER(S): 9 INJECTION INTRAMUSCULAR; INTRAVENOUS; SUBCUTANEOUS at 05:00

## 2017-03-19 RX ADMIN — Medication 3: at 11:49

## 2017-03-19 RX ADMIN — SODIUM CHLORIDE 3 MILLILITER(S): 9 INJECTION INTRAMUSCULAR; INTRAVENOUS; SUBCUTANEOUS at 21:27

## 2017-03-19 RX ADMIN — HYDROMORPHONE HYDROCHLORIDE 2 MILLIGRAM(S): 2 INJECTION INTRAMUSCULAR; INTRAVENOUS; SUBCUTANEOUS at 23:46

## 2017-03-19 RX ADMIN — SODIUM CHLORIDE 3 MILLILITER(S): 9 INJECTION INTRAMUSCULAR; INTRAVENOUS; SUBCUTANEOUS at 11:14

## 2017-03-19 RX ADMIN — Medication 100 MILLIGRAM(S): at 05:00

## 2017-03-19 RX ADMIN — HYDROMORPHONE HYDROCHLORIDE 2 MILLIGRAM(S): 2 INJECTION INTRAMUSCULAR; INTRAVENOUS; SUBCUTANEOUS at 03:00

## 2017-03-19 RX ADMIN — HYDROMORPHONE HYDROCHLORIDE 2 MILLIGRAM(S): 2 INJECTION INTRAMUSCULAR; INTRAVENOUS; SUBCUTANEOUS at 02:04

## 2017-03-19 RX ADMIN — Medication 2: at 07:59

## 2017-03-19 RX ADMIN — Medication 30 MILLIGRAM(S): at 05:00

## 2017-03-19 RX ADMIN — FLUTICASONE FUROATE AND VILANTEROL TRIFENATATE 1 PUFF(S): 100; 25 POWDER RESPIRATORY (INHALATION) at 11:53

## 2017-03-19 RX ADMIN — WARFARIN SODIUM 7.5 MILLIGRAM(S): 2.5 TABLET ORAL at 21:26

## 2017-03-19 RX ADMIN — HYDROMORPHONE HYDROCHLORIDE 2 MILLIGRAM(S): 2 INJECTION INTRAMUSCULAR; INTRAVENOUS; SUBCUTANEOUS at 15:47

## 2017-03-19 RX ADMIN — FAMOTIDINE 20 MILLIGRAM(S): 10 INJECTION INTRAVENOUS at 11:49

## 2017-03-19 RX ADMIN — HYDROMORPHONE HYDROCHLORIDE 2 MILLIGRAM(S): 2 INJECTION INTRAMUSCULAR; INTRAVENOUS; SUBCUTANEOUS at 23:16

## 2017-03-19 RX ADMIN — Medication 100 MILLIGRAM(S): at 21:26

## 2017-03-19 RX ADMIN — ATORVASTATIN CALCIUM 40 MILLIGRAM(S): 80 TABLET, FILM COATED ORAL at 21:26

## 2017-03-19 RX ADMIN — TAMSULOSIN HYDROCHLORIDE 0.4 MILLIGRAM(S): 0.4 CAPSULE ORAL at 21:26

## 2017-03-19 NOTE — PROVIDER CONTACT NOTE (OTHER) - ASSESSMENT
Patient not in any distress. Patient's current HR is in the 80s
/35 MAP 68. Pt remains afebrile; 37.2C
0200 , Glucose checks scheduled for q 4. BG 4 hours ago also over 200. Urine output at least 30/hr.
CO2 61, pH 7.36, HCO3 33.7, pO2 145; ex-smoker; hx COPD; pt on precedex gtt and appears free from distress while on CPAP 5/5
patient was sleeping at the time of the incident. Patient denies any discomfort, v/s WDL, potasium 4.1, Na 140. Patient continue on heparin infusion, no adverse effect noted, will continue to monitor.

## 2017-03-19 NOTE — PROVIDER CONTACT NOTE (OTHER) - REASON
, BUN 31, K+ 3.8
CO2 61 on pre-extubation ABG
HR 39
Patient's HR went down to 41 bpm
Blood culture results

## 2017-03-20 ENCOUNTER — TRANSCRIPTION ENCOUNTER (OUTPATIENT)
Age: 67
End: 2017-03-20

## 2017-03-20 VITALS
OXYGEN SATURATION: 96 % | HEART RATE: 68 BPM | SYSTOLIC BLOOD PRESSURE: 132 MMHG | DIASTOLIC BLOOD PRESSURE: 77 MMHG | TEMPERATURE: 99 F | RESPIRATION RATE: 18 BRPM

## 2017-03-20 LAB
ANION GAP SERPL CALC-SCNC: 15 MMOL/L — SIGNIFICANT CHANGE UP (ref 5–17)
BUN SERPL-MCNC: 15 MG/DL — SIGNIFICANT CHANGE UP (ref 7–23)
CALCIUM SERPL-MCNC: 8.8 MG/DL — SIGNIFICANT CHANGE UP (ref 8.4–10.5)
CHLORIDE SERPL-SCNC: 97 MMOL/L — SIGNIFICANT CHANGE UP (ref 96–108)
CO2 SERPL-SCNC: 26 MMOL/L — SIGNIFICANT CHANGE UP (ref 22–31)
CREAT SERPL-MCNC: 0.55 MG/DL — SIGNIFICANT CHANGE UP (ref 0.5–1.3)
GLUCOSE SERPL-MCNC: 143 MG/DL — HIGH (ref 70–99)
HCT VFR BLD CALC: 33.9 % — LOW (ref 39–50)
HGB BLD-MCNC: 11.3 G/DL — LOW (ref 13–17)
INR BLD: 1.94 RATIO — HIGH (ref 0.88–1.16)
MCHC RBC-ENTMCNC: 30.8 PG — SIGNIFICANT CHANGE UP (ref 27–34)
MCHC RBC-ENTMCNC: 33.4 GM/DL — SIGNIFICANT CHANGE UP (ref 32–36)
MCV RBC AUTO: 92.1 FL — SIGNIFICANT CHANGE UP (ref 80–100)
PLATELET # BLD AUTO: 108 K/UL — LOW (ref 150–400)
POTASSIUM SERPL-MCNC: 4.3 MMOL/L — SIGNIFICANT CHANGE UP (ref 3.5–5.3)
POTASSIUM SERPL-SCNC: 4.3 MMOL/L — SIGNIFICANT CHANGE UP (ref 3.5–5.3)
PROTHROM AB SERPL-ACNC: 21.1 SEC — HIGH (ref 10–13.1)
RBC # BLD: 3.68 M/UL — LOW (ref 4.2–5.8)
RBC # FLD: 18.2 % — HIGH (ref 10.3–14.5)
SODIUM SERPL-SCNC: 138 MMOL/L — SIGNIFICANT CHANGE UP (ref 135–145)
WBC # BLD: 7.1 K/UL — SIGNIFICANT CHANGE UP (ref 3.8–10.5)
WBC # FLD AUTO: 7.1 K/UL — SIGNIFICANT CHANGE UP (ref 3.8–10.5)

## 2017-03-20 PROCEDURE — 94002 VENT MGMT INPAT INIT DAY: CPT

## 2017-03-20 PROCEDURE — 87641 MR-STAPH DNA AMP PROBE: CPT

## 2017-03-20 PROCEDURE — 99238 HOSP IP/OBS DSCHRG MGMT 30/<: CPT

## 2017-03-20 PROCEDURE — P9016: CPT

## 2017-03-20 PROCEDURE — 87633 RESP VIRUS 12-25 TARGETS: CPT

## 2017-03-20 PROCEDURE — 93320 DOPPLER ECHO COMPLETE: CPT

## 2017-03-20 PROCEDURE — 82435 ASSAY OF BLOOD CHLORIDE: CPT

## 2017-03-20 PROCEDURE — 80048 BASIC METABOLIC PNL TOTAL CA: CPT

## 2017-03-20 PROCEDURE — 87486 CHLMYD PNEUM DNA AMP PROBE: CPT

## 2017-03-20 PROCEDURE — 84132 ASSAY OF SERUM POTASSIUM: CPT

## 2017-03-20 PROCEDURE — 82784 ASSAY IGA/IGD/IGG/IGM EACH: CPT

## 2017-03-20 PROCEDURE — 75572 CT HRT W/3D IMAGE: CPT

## 2017-03-20 PROCEDURE — 94010 BREATHING CAPACITY TEST: CPT

## 2017-03-20 PROCEDURE — 97162 PT EVAL MOD COMPLEX 30 MIN: CPT

## 2017-03-20 PROCEDURE — 86850 RBC ANTIBODY SCREEN: CPT

## 2017-03-20 PROCEDURE — 82550 ASSAY OF CK (CPK): CPT

## 2017-03-20 PROCEDURE — 87040 BLOOD CULTURE FOR BACTERIA: CPT

## 2017-03-20 PROCEDURE — C1769: CPT

## 2017-03-20 PROCEDURE — 86334 IMMUNOFIX E-PHORESIS SERUM: CPT

## 2017-03-20 PROCEDURE — 85014 HEMATOCRIT: CPT

## 2017-03-20 PROCEDURE — 83605 ASSAY OF LACTIC ACID: CPT

## 2017-03-20 PROCEDURE — 93306 TTE W/DOPPLER COMPLETE: CPT

## 2017-03-20 PROCEDURE — 82565 ASSAY OF CREATININE: CPT

## 2017-03-20 PROCEDURE — C1887: CPT

## 2017-03-20 PROCEDURE — 85610 PROTHROMBIN TIME: CPT

## 2017-03-20 PROCEDURE — 94003 VENT MGMT INPAT SUBQ DAY: CPT

## 2017-03-20 PROCEDURE — 86923 COMPATIBILITY TEST ELECTRIC: CPT

## 2017-03-20 PROCEDURE — 71275 CT ANGIOGRAPHY CHEST: CPT

## 2017-03-20 PROCEDURE — 84443 ASSAY THYROID STIM HORMONE: CPT

## 2017-03-20 PROCEDURE — 94640 AIRWAY INHALATION TREATMENT: CPT

## 2017-03-20 PROCEDURE — C1889: CPT

## 2017-03-20 PROCEDURE — 84165 PROTEIN E-PHORESIS SERUM: CPT

## 2017-03-20 PROCEDURE — 93005 ELECTROCARDIOGRAM TRACING: CPT

## 2017-03-20 PROCEDURE — 71045 X-RAY EXAM CHEST 1 VIEW: CPT

## 2017-03-20 PROCEDURE — 84295 ASSAY OF SERUM SODIUM: CPT

## 2017-03-20 PROCEDURE — 94660 CPAP INITIATION&MGMT: CPT

## 2017-03-20 PROCEDURE — 82330 ASSAY OF CALCIUM: CPT

## 2017-03-20 PROCEDURE — 83036 HEMOGLOBIN GLYCOSYLATED A1C: CPT

## 2017-03-20 PROCEDURE — P9047: CPT

## 2017-03-20 PROCEDURE — C1760: CPT

## 2017-03-20 PROCEDURE — 93880 EXTRACRANIAL BILAT STUDY: CPT

## 2017-03-20 PROCEDURE — 86022 PLATELET ANTIBODIES: CPT

## 2017-03-20 PROCEDURE — C1894: CPT

## 2017-03-20 PROCEDURE — 87070 CULTURE OTHR SPECIMN AEROBIC: CPT

## 2017-03-20 PROCEDURE — 83880 ASSAY OF NATRIURETIC PEPTIDE: CPT

## 2017-03-20 PROCEDURE — 84145 PROCALCITONIN (PCT): CPT

## 2017-03-20 PROCEDURE — 82553 CREATINE MB FRACTION: CPT

## 2017-03-20 PROCEDURE — 93312 ECHO TRANSESOPHAGEAL: CPT

## 2017-03-20 PROCEDURE — 86900 BLOOD TYPING SEROLOGIC ABO: CPT

## 2017-03-20 PROCEDURE — 87581 M.PNEUMON DNA AMP PROBE: CPT

## 2017-03-20 PROCEDURE — 81003 URINALYSIS AUTO W/O SCOPE: CPT

## 2017-03-20 PROCEDURE — 82947 ASSAY GLUCOSE BLOOD QUANT: CPT

## 2017-03-20 PROCEDURE — 85730 THROMBOPLASTIN TIME PARTIAL: CPT

## 2017-03-20 PROCEDURE — 83521 IG LIGHT CHAINS FREE EACH: CPT

## 2017-03-20 PROCEDURE — 84155 ASSAY OF PROTEIN SERUM: CPT

## 2017-03-20 PROCEDURE — 84484 ASSAY OF TROPONIN QUANT: CPT

## 2017-03-20 PROCEDURE — 82803 BLOOD GASES ANY COMBINATION: CPT

## 2017-03-20 PROCEDURE — 80053 COMPREHEN METABOLIC PANEL: CPT

## 2017-03-20 PROCEDURE — 85027 COMPLETE CBC AUTOMATED: CPT

## 2017-03-20 PROCEDURE — 87640 STAPH A DNA AMP PROBE: CPT

## 2017-03-20 PROCEDURE — P9045: CPT

## 2017-03-20 PROCEDURE — 93355 ECHO TRANSESOPHAGEAL (TEE): CPT

## 2017-03-20 PROCEDURE — 93325 DOPPLER ECHO COLOR FLOW MAPG: CPT

## 2017-03-20 PROCEDURE — 86901 BLOOD TYPING SEROLOGIC RH(D): CPT

## 2017-03-20 PROCEDURE — 87798 DETECT AGENT NOS DNA AMP: CPT

## 2017-03-20 RX ORDER — TAMSULOSIN HYDROCHLORIDE 0.4 MG/1
1 CAPSULE ORAL
Qty: 30 | Refills: 0 | OUTPATIENT
Start: 2017-03-20 | End: 2017-04-19

## 2017-03-20 RX ORDER — ATORVASTATIN CALCIUM 80 MG/1
1 TABLET, FILM COATED ORAL
Qty: 0 | Refills: 0 | COMMUNITY

## 2017-03-20 RX ORDER — ENOXAPARIN SODIUM 100 MG/ML
80 INJECTION SUBCUTANEOUS ONCE
Qty: 0 | Refills: 0 | Status: DISCONTINUED | OUTPATIENT
Start: 2017-03-20 | End: 2017-03-20

## 2017-03-20 RX ORDER — DOCUSATE SODIUM 100 MG
1 CAPSULE ORAL
Qty: 30 | Refills: 0 | OUTPATIENT
Start: 2017-03-20 | End: 2017-03-30

## 2017-03-20 RX ORDER — DABIGATRAN ETEXILATE MESYLATE 150 MG/1
1 CAPSULE ORAL
Qty: 0 | Refills: 0 | COMMUNITY

## 2017-03-20 RX ORDER — DEXAMETHASONE 0.5 MG/5ML
16 ELIXIR ORAL
Qty: 0 | Refills: 0 | COMMUNITY

## 2017-03-20 RX ORDER — FAMOTIDINE 10 MG/ML
1 INJECTION INTRAVENOUS
Qty: 30 | Refills: 0 | OUTPATIENT
Start: 2017-03-20 | End: 2017-04-19

## 2017-03-20 RX ORDER — FUROSEMIDE 40 MG
1 TABLET ORAL
Qty: 0 | Refills: 0 | COMMUNITY

## 2017-03-20 RX ORDER — ENOXAPARIN SODIUM 100 MG/ML
90 INJECTION SUBCUTANEOUS ONCE
Qty: 0 | Refills: 0 | Status: DISCONTINUED | OUTPATIENT
Start: 2017-03-20 | End: 2017-03-20

## 2017-03-20 RX ORDER — WARFARIN SODIUM 2.5 MG/1
1 TABLET ORAL
Qty: 30 | Refills: 0 | OUTPATIENT
Start: 2017-03-20 | End: 2017-04-19

## 2017-03-20 RX ORDER — ASPIRIN/CALCIUM CARB/MAGNESIUM 324 MG
1 TABLET ORAL
Qty: 0 | Refills: 0 | COMMUNITY

## 2017-03-20 RX ORDER — HYDROMORPHONE HYDROCHLORIDE 2 MG/ML
1 INJECTION INTRAMUSCULAR; INTRAVENOUS; SUBCUTANEOUS
Qty: 10 | Refills: 0 | OUTPATIENT
Start: 2017-03-20

## 2017-03-20 RX ORDER — CARVEDILOL PHOSPHATE 80 MG/1
12.5 CAPSULE, EXTENDED RELEASE ORAL
Qty: 0 | Refills: 0 | COMMUNITY

## 2017-03-20 RX ORDER — ASPIRIN/CALCIUM CARB/MAGNESIUM 324 MG
1 TABLET ORAL
Qty: 30 | Refills: 0 | OUTPATIENT
Start: 2017-03-20 | End: 2017-04-19

## 2017-03-20 RX ORDER — ATORVASTATIN CALCIUM 80 MG/1
1 TABLET, FILM COATED ORAL
Qty: 30 | Refills: 0 | OUTPATIENT
Start: 2017-03-20 | End: 2017-04-19

## 2017-03-20 RX ADMIN — Medication 2: at 07:50

## 2017-03-20 RX ADMIN — Medication 81 MILLIGRAM(S): at 11:40

## 2017-03-20 RX ADMIN — Medication 4000 UNIT(S): at 11:40

## 2017-03-20 RX ADMIN — Medication 6: at 11:40

## 2017-03-20 RX ADMIN — Medication 30 MILLIGRAM(S): at 05:23

## 2017-03-20 RX ADMIN — FAMOTIDINE 20 MILLIGRAM(S): 10 INJECTION INTRAVENOUS at 11:40

## 2017-03-20 RX ADMIN — SODIUM CHLORIDE 3 MILLILITER(S): 9 INJECTION INTRAMUSCULAR; INTRAVENOUS; SUBCUTANEOUS at 05:23

## 2017-03-20 RX ADMIN — Medication 100 MILLIGRAM(S): at 05:22

## 2017-03-20 RX ADMIN — FLUTICASONE FUROATE AND VILANTEROL TRIFENATATE 1 PUFF(S): 100; 25 POWDER RESPIRATORY (INHALATION) at 11:40

## 2017-03-20 NOTE — DISCHARGE NOTE ADULT - CARE PROVIDER_API CALL
Heri Goldsmith (MD), Cardiovascular Disease; Interventional Cardiology  [address] [place] [address]  Phone: [phone]  Fax: [phone]    Wilson Whitt (), Internal Medicine  [address] [address]  Phone: [phone]  Fax: [phone]

## 2017-03-20 NOTE — DISCHARGE NOTE ADULT - CARE PROVIDERS DIRECT ADDRESSES
,[email or web address],DirectAddress_Unknown,DirectAddress_Unknown

## 2017-03-20 NOTE — DISCHARGE NOTE ADULT - HOSPITAL COURSE
This patient is a 67 year old male with PMH of AI, CAD s/p AVR/CABG with Dr. Adame in 2011, multiple myeloma, colon cancer s/p resection, DVT and B/L PE 1/20/2017, DM2, HTN, HLD, MR, CVA, COPD who was admitted to the CTU from the cardiothoracic office at [facility] complaining of worsening SOB for 3 days. The patient admits to SOB/dyspnea with minimal activities with an associated cough that produces yellow sputum. He also admitted to PND - sleeping upright in chair, fever of 102 fahrenheit and generalized weakness. The patient denies CP, palpitations, N/V, lightheadedness. This patient is a 67 year old male with PMH of AI, CAD s/p AVR/CABG with Dr. Adame in 2011, multiple myeloma, colon cancer s/p resection, DVT and B/L PE 1/20/2017, DM2, HTN, HLD, MR, CVA, COPD who was admitted to the CTU from the cardiothoracic office at [facility] complaining of worsening SOB for 3 days. The patient admits to SOB/dyspnea with minimal activities with an associated cough that produces yellow sputum. He also admitted to PND - sleeping upright in chair, fever of 102 fahrenheit and generalized weakness. The patient denies CP, palpitations, N/V, lightheadedness.   s/p 3/15/17 TAVR-core via right transfemoral with DR. Adame ef- nl  postop 3/16 echo neg pericardial effusion/ neg AR  urinary retention- allison reinsterted- flomax initiated. 3/18 allison d/c- pt voiding without difficulty  anticoagulation with asa and coumadin - history of PE- PCP- DR. Whitt to follow coumadin levels as an outpatient   3/20 discharge pt home as per Dr. Adame

## 2017-03-20 NOTE — DISCHARGE NOTE ADULT - MEDICATION SUMMARY - MEDICATIONS TO TAKE
I will START or STAY ON the medications listed below when I get home from the hospital:    freetext medication  -  -- 62.5 microgram(s) inhaled once a day  -- Indication: For inhaler- copd    predniSONE 20 mg oral tablet  -- 1 tab(s) by mouth once a day- on 3/21, 3/22, 3/23, and 3/24   -- Indication: For Steroid taper/ copd    predniSONE 10 mg oral tablet  -- 1 tab(s) by mouth once a day for 4 days only- starting 3/25, 3/26, 3/27, and 3/28  then stop   -- Indication: For Steroid taper/ copd    HYDROmorphone 2 mg oral tablet  -- 1 tab(s) by mouth every 6 hours, As Needed, Severe Pain (7 - 10) MDD:4  -- Indication: For pain    aspirin 81 mg oral delayed release tablet  -- 1 tab(s) by mouth once a day  -- Indication: For blood thinner    tamsulosin 0.4 mg oral capsule  -- 1 cap(s) by mouth once a day (at bedtime)  -- Indication: For prostate enlargement    Coumadin 6 mg oral tablet  -- 1 tab(s) by mouth once a day- dose to be adjusted by DR. Whitt  -- Indication: For blood thinner    Lyrica 200 mg oral capsule  -- 200 milligram(s) by mouth 2 times a day  -- Indication: For Anti-convulsant    glimepiride 1 mg oral tablet  -- 1 tab(s) by mouth once a day  -- Indication: For Diabetes    Janumet  mg-1000 mg oral tablet, extended release  -- 1 tab(s) by mouth once a day (in the evening)  -- Indication: For Diabetes    atorvastatin 40 mg oral tablet  -- 1 tab(s) by mouth once a day (at bedtime)  -- Indication: For Cholesterol    ProAir HFA 90 mcg/inh inhalation aerosol  -- 2 puff(s) inhaled 4 times a day  -- Indication: For bronchodilator/ copd    Spiriva 18 mcg inhalation capsule  -- 1 cap(s) inhaled once a day  -- Indication: For bronchodilator/copd    Symbicort 160 mcg-4.5 mcg/inh inhalation aerosol  -- 2 puff(s) inhaled 2 times a day  -- Indication: For bronchodilator/ copd    famotidine 20 mg oral tablet  -- 1 tab(s) by mouth once a day  -- Indication: For Antacid    docusate sodium 100 mg oral capsule  -- 1 cap(s) by mouth 3 times a day  -- Indication: For Stool softener    Pomalyst 4 mg oral capsule  -- 1 cap(s) by mouth once a day  -- on for 21 days/off for 7days  -- Indication: For Chemotherapy    Align 4 mg oral capsule  -- 1 cap(s) by mouth once a day  -- Indication: For probiotic    Vitamin D3 1000 intl units oral capsule  -- 4000 international unit(s) by mouth once a day  -- Indication: For vitamin supplement

## 2017-03-20 NOTE — DISCHARGE NOTE ADULT - NS AS DC VTE INSTRUCTION
Coumadin/Warfarin - Compliance.../Coumadin/Warfarin - Follow-up monitoring.../Coumadin/Warfarin - Dietary Advice.../Coumadin/Warfarin - Potential for adverse drug reactions and interactions

## 2017-03-20 NOTE — DISCHARGE NOTE ADULT - CARE PLAN
Principal Discharge DX:	S/P TAVR (transcatheter aortic valve replacement)  Goal:	complete recovery  Instructions for follow-up, activity and diet:	shower daily  weigh yourself daily  continue current prescriptions as ordered  increase activity as tolerated   no added salt; low fat; low cholesterol, low salt diet.   follow up with Cardiologist in 1-2 weeks. Call to schedule appointment.  follow up with cardiac surgeon Principal Discharge DX:	S/P TAVR (transcatheter aortic valve replacement)  Goal:	complete recovery  Instructions for follow-up, activity and diet:	shower daily  weigh yourself daily  continue current prescriptions as ordered  increase activity as tolerated   no added salt; low fat; low cholesterol, low salt diet. diabetic diet  check blood sugar levels before meals and at bedtime- record levels  coumadin bloodwork (INR levels) every thursday starting March 23rd, Fax levels to primary care doctor, DR. Whitt   office [phone]/   fax: [phone]  follow up with Cardiologist, DR. Goldsmith, in 7 days and again in 30 days for echocardiogram. Call to schedule appointment. [phone] Principal Discharge DX:	S/P TAVR (transcatheter aortic valve replacement)  Goal:	complete recovery  Instructions for follow-up, activity and diet:	shower daily  weigh yourself daily  continue current prescriptions as ordered  increase activity as tolerated   no added salt; low fat; low cholesterol, low salt diet. diabetic diet  check blood sugar levels before meals and at bedtime- record levels  coumadin bloodwork (INR levels) every thursday starting March 23rd, Fax levels to primary care doctor, DR. Whitt   office [phone]/   fax: [phone]  follow up with Cardiologist, DR. Goldsmith, in 7 days and again in 30 days for echocardiogram. Call to schedule appointment. [phone]

## 2017-03-20 NOTE — DISCHARGE NOTE ADULT - MEDICATION SUMMARY - MEDICATIONS TO STOP TAKING
I will STOP taking the medications listed below when I get home from the hospital:    Coreg CR  -- 12.5 milligram(s) by mouth 2 times a day    Lasix 40 mg oral tablet  -- 1 tab(s) by mouth once a day    Pradaxa 150 mg oral capsule  -- 1 cap(s) by mouth 2 times a day

## 2017-03-20 NOTE — DISCHARGE NOTE ADULT - NS AS ACTIVITY OBS
Do not make important decisions/Driving allowed/No Heavy lifting/straining/Showering allowed/Walking-Indoors allowed/Walking-Outdoors allowed/Return to Work/School allowed/Stairs allowed/Sex allowed/Do not drive or operate machinery

## 2017-03-20 NOTE — DISCHARGE NOTE ADULT - PLAN OF CARE
complete recovery shower daily  weigh yourself daily  continue current prescriptions as ordered  increase activity as tolerated   no added salt; low fat; low cholesterol, low salt diet.   follow up with Cardiologist in 1-2 weeks. Call to schedule appointment.  follow up with cardiac surgeon shower daily  weigh yourself daily  continue current prescriptions as ordered  increase activity as tolerated   no added salt; low fat; low cholesterol, low salt diet. diabetic diet  check blood sugar levels before meals and at bedtime- record levels  coumadin bloodwork (INR levels) every thursday starting March 23rd, Fax levels to primary care doctor, DR. Whitt   office [phone]/   fax: [phone]  follow up with Cardiologist, DR. Goldsmith, in 7 days and again in 30 days for echocardiogram. Call to schedule appointment. [phone]

## 2017-03-20 NOTE — DISCHARGE NOTE ADULT - ADDITIONAL INSTRUCTIONS
follow up with Dr. Goldsmith in 7 days and again in 30 days for echocardiogram. Call to schedule appointment. [phone]  follow up with Dr. Whitt for coumadin bloodwork (INR levels) every thursday starting March 23rd. fax levels to Dr. Whitt at [phone]

## 2017-03-20 NOTE — DISCHARGE NOTE ADULT - MEDICATION SUMMARY - MEDICATIONS TO CHANGE
I will SWITCH the dose or number of times a day I take the medications listed below when I get home from the hospital:    dexamethasone  -- 16 milligram(s) by mouth once a week  -- every friday    Lipitor 10 mg oral tablet  -- 1 tab(s) by mouth once a day

## 2017-03-20 NOTE — DISCHARGE NOTE ADULT - CONDITIONS AT DISCHARGE
Patient PIVL removed. 0 evidence of infiltration noted at discharge. Patient skin intact, vital signs stable, Patient with 0 complain of incisional  pain,  SOB, or chest pain at discharge. Incisions are clean dry intact presently no signs of oozing or infection present

## 2017-04-14 ENCOUNTER — APPOINTMENT (OUTPATIENT)
Dept: CARDIOLOGY | Facility: CLINIC | Age: 67
End: 2017-04-14

## 2017-04-14 ENCOUNTER — APPOINTMENT (OUTPATIENT)
Dept: CV DIAGNOSITCS | Facility: HOSPITAL | Age: 67
End: 2017-04-14

## 2017-04-14 ENCOUNTER — OUTPATIENT (OUTPATIENT)
Dept: OUTPATIENT SERVICES | Facility: HOSPITAL | Age: 67
LOS: 1 days | End: 2017-04-14
Payer: MEDICARE

## 2017-04-14 VITALS
SYSTOLIC BLOOD PRESSURE: 159 MMHG | HEART RATE: 78 BPM | HEIGHT: 75 IN | WEIGHT: 206 LBS | OXYGEN SATURATION: 97 % | BODY MASS INDEX: 25.61 KG/M2 | DIASTOLIC BLOOD PRESSURE: 81 MMHG

## 2017-04-14 DIAGNOSIS — Z90.49 ACQUIRED ABSENCE OF OTHER SPECIFIED PARTS OF DIGESTIVE TRACT: Chronic | ICD-10-CM

## 2017-04-14 DIAGNOSIS — Z95.1 PRESENCE OF AORTOCORONARY BYPASS GRAFT: Chronic | ICD-10-CM

## 2017-04-14 DIAGNOSIS — I10 ESSENTIAL (PRIMARY) HYPERTENSION: ICD-10-CM

## 2017-04-14 DIAGNOSIS — I34.0 NONRHEUMATIC MITRAL (VALVE) INSUFFICIENCY: ICD-10-CM

## 2017-04-14 DIAGNOSIS — Z95.2 PRESENCE OF PROSTHETIC HEART VALVE: ICD-10-CM

## 2017-04-14 DIAGNOSIS — Z95.2 PRESENCE OF PROSTHETIC HEART VALVE: Chronic | ICD-10-CM

## 2017-04-14 PROCEDURE — 93306 TTE W/DOPPLER COMPLETE: CPT | Mod: 26

## 2017-04-14 PROCEDURE — 93306 TTE W/DOPPLER COMPLETE: CPT

## 2017-04-14 RX ORDER — BENZONATATE 100 MG/1
100 CAPSULE ORAL
Qty: 60 | Refills: 0 | Status: DISCONTINUED | COMMUNITY
Start: 2017-01-25

## 2017-04-14 RX ORDER — PREDNISONE 20 MG/1
20 TABLET ORAL
Qty: 4 | Refills: 0 | Status: DISCONTINUED | COMMUNITY
Start: 2017-03-22

## 2017-04-14 RX ORDER — FUROSEMIDE 40 MG/1
40 TABLET ORAL DAILY
Qty: 14 | Refills: 0 | Status: DISCONTINUED | COMMUNITY
End: 2017-04-14

## 2017-04-14 RX ORDER — ATORVASTATIN CALCIUM 40 MG/1
40 TABLET, FILM COATED ORAL
Qty: 90 | Refills: 3 | Status: ACTIVE | COMMUNITY

## 2017-04-14 RX ORDER — CODEINE PHOSPHATE AND GUAIFENESIN 10; 100 MG/5ML; MG/5ML
100-10 LIQUID ORAL
Qty: 150 | Refills: 0 | Status: DISCONTINUED | COMMUNITY
Start: 2016-12-09

## 2017-04-14 RX ORDER — UMECLIDINIUM 62.5 UG/1
62.5 AEROSOL, POWDER ORAL DAILY
Refills: 0 | Status: ACTIVE | COMMUNITY
Start: 2017-04-14

## 2017-04-14 RX ORDER — CARVEDILOL 12.5 MG/1
12.5 TABLET, FILM COATED ORAL TWICE DAILY
Qty: 60 | Refills: 3 | Status: DISCONTINUED | COMMUNITY
End: 2017-04-14

## 2017-04-14 RX ORDER — CHOLECALCIFEROL (VITAMIN D3) 50 MCG
50 MCG TABLET ORAL DAILY
Refills: 0 | Status: ACTIVE | COMMUNITY
Start: 2017-04-14

## 2017-04-14 RX ORDER — PREDNISONE 10 MG/1
10 TABLET ORAL
Qty: 4 | Refills: 0 | Status: DISCONTINUED | COMMUNITY
Start: 2017-03-22

## 2017-04-14 RX ORDER — IPRATROPIUM/ALBUTEROL SULFATE 0.5-3MG/3
0.5-2.5 (3) AMPUL FOR NEBULIZATION (ML) INHALATION 4 TIMES DAILY
Refills: 0 | Status: DISCONTINUED | COMMUNITY
End: 2017-04-14

## 2017-04-14 RX ORDER — ALBUTEROL SULFATE 90 UG/1
108 (90 BASE) AEROSOL, METERED RESPIRATORY (INHALATION)
Qty: 27 | Refills: 0 | Status: DISCONTINUED | COMMUNITY
Start: 2016-11-10

## 2017-04-14 RX ORDER — BUDESONIDE AND FORMOTEROL FUMARATE DIHYDRATE 160; 4.5 UG/1; UG/1
160-4.5 AEROSOL RESPIRATORY (INHALATION) TWICE DAILY
Refills: 0 | Status: DISCONTINUED | COMMUNITY
End: 2017-04-14

## 2017-04-14 RX ORDER — ALBUTEROL SULFATE 2.5 MG/3ML
(2.5 MG/3ML) SOLUTION RESPIRATORY (INHALATION) 4 TIMES DAILY
Refills: 0 | Status: DISCONTINUED | COMMUNITY
End: 2017-04-14

## 2017-04-14 RX ORDER — NICOTINE 11MG/24HR
50 MCG PATCH, TRANSDERMAL 24 HOURS TRANSDERMAL DAILY
Refills: 0 | Status: DISCONTINUED | COMMUNITY
End: 2017-04-14

## 2017-04-14 RX ORDER — TIOTROPIUM BROMIDE 18 UG/1
18 CAPSULE ORAL; RESPIRATORY (INHALATION) DAILY
Refills: 0 | Status: DISCONTINUED | COMMUNITY
End: 2017-04-14

## 2017-04-14 RX ORDER — FLUTICASONE FUROATE AND VILANTEROL TRIFENATATE 100; 25 UG/1; UG/1
100-25 POWDER RESPIRATORY (INHALATION) DAILY
Refills: 0 | Status: ACTIVE | COMMUNITY
Start: 2017-04-14

## 2017-04-14 RX ORDER — BIFIDOBACTERIUM LONGUM 10MM CELL
4 CAPSULE ORAL
Refills: 0 | Status: DISCONTINUED | COMMUNITY
End: 2017-04-14

## 2017-04-14 RX ORDER — DABIGATRAN ETEXILATE MESYLATE 150 MG/1
150 CAPSULE ORAL TWICE DAILY
Refills: 0 | Status: DISCONTINUED | COMMUNITY
End: 2017-04-14

## 2017-04-14 RX ORDER — ALBUTEROL 90 MCG
AEROSOL (GRAM) INHALATION
Refills: 0 | Status: DISCONTINUED | COMMUNITY
End: 2017-04-14

## 2017-04-14 RX ORDER — LEVOFLOXACIN 500 MG/1
500 TABLET, FILM COATED ORAL
Qty: 7 | Refills: 0 | Status: DISCONTINUED | COMMUNITY
Start: 2016-12-09

## 2017-04-14 RX ORDER — GUAIFENESIN 600 MG/1
600 TABLET, EXTENDED RELEASE ORAL
Refills: 0 | Status: DISCONTINUED | COMMUNITY
End: 2017-04-14

## 2017-04-14 RX ORDER — WARFARIN 10 MG/1
10 TABLET ORAL
Qty: 30 | Refills: 0 | Status: ACTIVE | COMMUNITY
Start: 2017-01-25

## 2017-04-14 RX ORDER — RAMIPRIL 5 MG/1
5 CAPSULE ORAL
Qty: 30 | Refills: 0 | Status: DISCONTINUED | COMMUNITY
Start: 2017-04-02

## 2017-05-25 ENCOUNTER — RESULT REVIEW (OUTPATIENT)
Age: 67
End: 2017-05-25

## 2017-06-22 ENCOUNTER — NON-APPOINTMENT (OUTPATIENT)
Age: 67
End: 2017-06-22

## 2017-06-22 ENCOUNTER — INPATIENT (INPATIENT)
Facility: HOSPITAL | Age: 67
LOS: 2 days | Discharge: HOME | DRG: 243 | End: 2017-06-25
Attending: INTERNAL MEDICINE | Admitting: HOSPITALIST
Payer: MEDICARE

## 2017-06-22 ENCOUNTER — APPOINTMENT (OUTPATIENT)
Dept: CARDIOLOGY | Facility: CLINIC | Age: 67
End: 2017-06-22

## 2017-06-22 VITALS
OXYGEN SATURATION: 99 % | SYSTOLIC BLOOD PRESSURE: 122 MMHG | WEIGHT: 205 LBS | BODY MASS INDEX: 25.49 KG/M2 | HEART RATE: 64 BPM | HEIGHT: 75 IN | DIASTOLIC BLOOD PRESSURE: 68 MMHG

## 2017-06-22 VITALS
RESPIRATION RATE: 18 BRPM | DIASTOLIC BLOOD PRESSURE: 66 MMHG | SYSTOLIC BLOOD PRESSURE: 113 MMHG | TEMPERATURE: 98 F | OXYGEN SATURATION: 98 % | HEART RATE: 65 BPM

## 2017-06-22 DIAGNOSIS — I25.10 ATHEROSCLEROTIC HEART DISEASE OF NATIVE CORONARY ARTERY WITHOUT ANGINA PECTORIS: ICD-10-CM

## 2017-06-22 DIAGNOSIS — I44.7 LEFT BUNDLE-BRANCH BLOCK, UNSPECIFIED: ICD-10-CM

## 2017-06-22 DIAGNOSIS — R55 SYNCOPE AND COLLAPSE: ICD-10-CM

## 2017-06-22 DIAGNOSIS — I10 ESSENTIAL (PRIMARY) HYPERTENSION: ICD-10-CM

## 2017-06-22 DIAGNOSIS — E11.9 TYPE 2 DIABETES MELLITUS WITHOUT COMPLICATIONS: ICD-10-CM

## 2017-06-22 DIAGNOSIS — Z90.49 ACQUIRED ABSENCE OF OTHER SPECIFIED PARTS OF DIGESTIVE TRACT: Chronic | ICD-10-CM

## 2017-06-22 DIAGNOSIS — C90.00 MULTIPLE MYELOMA NOT HAVING ACHIEVED REMISSION: ICD-10-CM

## 2017-06-22 DIAGNOSIS — C18.2 MALIGNANT NEOPLASM OF ASCENDING COLON: ICD-10-CM

## 2017-06-22 DIAGNOSIS — Z29.9 ENCOUNTER FOR PROPHYLACTIC MEASURES, UNSPECIFIED: ICD-10-CM

## 2017-06-22 DIAGNOSIS — J44.9 CHRONIC OBSTRUCTIVE PULMONARY DISEASE, UNSPECIFIED: ICD-10-CM

## 2017-06-22 DIAGNOSIS — I26.99 OTHER PULMONARY EMBOLISM WITHOUT ACUTE COR PULMONALE: ICD-10-CM

## 2017-06-22 DIAGNOSIS — Z95.2 PRESENCE OF PROSTHETIC HEART VALVE: Chronic | ICD-10-CM

## 2017-06-22 DIAGNOSIS — Z95.1 PRESENCE OF AORTOCORONARY BYPASS GRAFT: Chronic | ICD-10-CM

## 2017-06-22 LAB
ALBUMIN SERPL ELPH-MCNC: 3.7 G/DL — SIGNIFICANT CHANGE UP (ref 3.3–5)
ALP SERPL-CCNC: 60 U/L — SIGNIFICANT CHANGE UP (ref 40–120)
ALT FLD-CCNC: 13 U/L RC — SIGNIFICANT CHANGE UP (ref 10–45)
ANION GAP SERPL CALC-SCNC: 12 MMOL/L — SIGNIFICANT CHANGE UP (ref 5–17)
APTT BLD: 33.1 SEC — SIGNIFICANT CHANGE UP (ref 27.5–37.4)
APTT BLD: 37 SEC — SIGNIFICANT CHANGE UP (ref 27.5–37.4)
AST SERPL-CCNC: 16 U/L — SIGNIFICANT CHANGE UP (ref 10–40)
BASOPHILS # BLD AUTO: 0 K/UL — SIGNIFICANT CHANGE UP (ref 0–0.2)
BASOPHILS NFR BLD AUTO: 0 % — SIGNIFICANT CHANGE UP (ref 0–2)
BILIRUB SERPL-MCNC: 0.6 MG/DL — SIGNIFICANT CHANGE UP (ref 0.2–1.2)
BUN SERPL-MCNC: 15 MG/DL — SIGNIFICANT CHANGE UP (ref 7–23)
CALCIUM SERPL-MCNC: 8.9 MG/DL — SIGNIFICANT CHANGE UP (ref 8.4–10.5)
CHLORIDE SERPL-SCNC: 102 MMOL/L — SIGNIFICANT CHANGE UP (ref 96–108)
CK MB BLD-MCNC: 4.4 % — HIGH (ref 0–3.5)
CK MB CFR SERPL CALC: 2.5 NG/ML — SIGNIFICANT CHANGE UP (ref 0–6.7)
CK SERPL-CCNC: 57 U/L — SIGNIFICANT CHANGE UP (ref 30–200)
CO2 SERPL-SCNC: 25 MMOL/L — SIGNIFICANT CHANGE UP (ref 22–31)
CREAT SERPL-MCNC: 0.6 MG/DL — SIGNIFICANT CHANGE UP (ref 0.5–1.3)
EOSINOPHIL # BLD AUTO: 0.1 K/UL — SIGNIFICANT CHANGE UP (ref 0–0.5)
EOSINOPHIL NFR BLD AUTO: 1.3 % — SIGNIFICANT CHANGE UP (ref 0–6)
GLUCOSE SERPL-MCNC: 127 MG/DL — HIGH (ref 70–99)
HCT VFR BLD CALC: 34.6 % — LOW (ref 39–50)
HGB BLD-MCNC: 11.7 G/DL — LOW (ref 13–17)
INR BLD: 1.71 RATIO — HIGH (ref 0.88–1.16)
INR BLD: 1.78 RATIO — HIGH (ref 0.88–1.16)
LYMPHOCYTES # BLD AUTO: 0.8 K/UL — LOW (ref 1–3.3)
LYMPHOCYTES # BLD AUTO: 13 % — SIGNIFICANT CHANGE UP (ref 13–44)
MCHC RBC-ENTMCNC: 31.4 PG — SIGNIFICANT CHANGE UP (ref 27–34)
MCHC RBC-ENTMCNC: 33.7 GM/DL — SIGNIFICANT CHANGE UP (ref 32–36)
MCV RBC AUTO: 93.1 FL — SIGNIFICANT CHANGE UP (ref 80–100)
MONOCYTES # BLD AUTO: 1.2 K/UL — HIGH (ref 0–0.9)
MONOCYTES NFR BLD AUTO: 20 % — HIGH (ref 2–14)
NEUTROPHILS # BLD AUTO: 4.1 K/UL — SIGNIFICANT CHANGE UP (ref 1.8–7.4)
NEUTROPHILS NFR BLD AUTO: 65.8 % — SIGNIFICANT CHANGE UP (ref 43–77)
NT-PROBNP SERPL-SCNC: 384 PG/ML — HIGH (ref 0–300)
PLATELET # BLD AUTO: 127 K/UL — LOW (ref 150–400)
POTASSIUM SERPL-MCNC: 4.2 MMOL/L — SIGNIFICANT CHANGE UP (ref 3.5–5.3)
POTASSIUM SERPL-SCNC: 4.2 MMOL/L — SIGNIFICANT CHANGE UP (ref 3.5–5.3)
PROT SERPL-MCNC: 6.8 G/DL — SIGNIFICANT CHANGE UP (ref 6–8.3)
PROTHROM AB SERPL-ACNC: 18.8 SEC — HIGH (ref 9.8–12.7)
PROTHROM AB SERPL-ACNC: 19.6 SEC — HIGH (ref 9.8–12.7)
RBC # BLD: 3.72 M/UL — LOW (ref 4.2–5.8)
RBC # FLD: 14.3 % — SIGNIFICANT CHANGE UP (ref 10.3–14.5)
SODIUM SERPL-SCNC: 139 MMOL/L — SIGNIFICANT CHANGE UP (ref 135–145)
TROPONIN T SERPL-MCNC: <0.01 NG/ML — SIGNIFICANT CHANGE UP (ref 0–0.06)
WBC # BLD: 6.2 K/UL — SIGNIFICANT CHANGE UP (ref 3.8–10.5)
WBC # FLD AUTO: 6.2 K/UL — SIGNIFICANT CHANGE UP (ref 3.8–10.5)

## 2017-06-22 PROCEDURE — 99285 EMERGENCY DEPT VISIT HI MDM: CPT | Mod: 25

## 2017-06-22 PROCEDURE — 99223 1ST HOSP IP/OBS HIGH 75: CPT

## 2017-06-22 PROCEDURE — 71010: CPT | Mod: 26

## 2017-06-22 PROCEDURE — 93010 ELECTROCARDIOGRAM REPORT: CPT

## 2017-06-22 RX ORDER — GLUCAGON INJECTION, SOLUTION 0.5 MG/.1ML
1 INJECTION, SOLUTION SUBCUTANEOUS ONCE
Qty: 0 | Refills: 0 | Status: DISCONTINUED | OUTPATIENT
Start: 2017-06-22 | End: 2017-06-25

## 2017-06-22 RX ORDER — CHOLECALCIFEROL (VITAMIN D3) 125 MCG
5000 CAPSULE ORAL DAILY
Qty: 0 | Refills: 0 | Status: DISCONTINUED | OUTPATIENT
Start: 2017-06-22 | End: 2017-06-25

## 2017-06-22 RX ORDER — HEPARIN SODIUM 5000 [USP'U]/ML
5000 INJECTION INTRAVENOUS; SUBCUTANEOUS EVERY 8 HOURS
Qty: 0 | Refills: 0 | Status: DISCONTINUED | OUTPATIENT
Start: 2017-06-22 | End: 2017-06-25

## 2017-06-22 RX ORDER — HYDROMORPHONE HYDROCHLORIDE 2 MG/ML
1 INJECTION INTRAMUSCULAR; INTRAVENOUS; SUBCUTANEOUS
Qty: 0 | Refills: 0 | COMMUNITY

## 2017-06-22 RX ORDER — DEXTROSE 50 % IN WATER 50 %
1 SYRINGE (ML) INTRAVENOUS ONCE
Qty: 0 | Refills: 0 | Status: DISCONTINUED | OUTPATIENT
Start: 2017-06-22 | End: 2017-06-25

## 2017-06-22 RX ORDER — FAMOTIDINE 20 MG/1
20 TABLET, FILM COATED ORAL
Refills: 0 | Status: DISCONTINUED | COMMUNITY
Start: 2017-04-14 | End: 2017-06-22

## 2017-06-22 RX ORDER — CELECOXIB 200 MG/1
1 CAPSULE ORAL
Qty: 0 | Refills: 0 | COMMUNITY

## 2017-06-22 RX ORDER — TIOTROPIUM BROMIDE 18 UG/1
1 CAPSULE ORAL; RESPIRATORY (INHALATION) DAILY
Qty: 0 | Refills: 0 | Status: DISCONTINUED | OUTPATIENT
Start: 2017-06-22 | End: 2017-06-25

## 2017-06-22 RX ORDER — CHOLECALCIFEROL (VITAMIN D3) 125 MCG
5 CAPSULE ORAL
Qty: 0 | Refills: 0 | COMMUNITY

## 2017-06-22 RX ORDER — FLUTICASONE FUROATE AND VILANTEROL TRIFENATATE 100; 25 UG/1; UG/1
1 POWDER RESPIRATORY (INHALATION)
Qty: 0 | Refills: 0 | COMMUNITY

## 2017-06-22 RX ORDER — INSULIN LISPRO 100/ML
VIAL (ML) SUBCUTANEOUS AT BEDTIME
Qty: 0 | Refills: 0 | Status: DISCONTINUED | OUTPATIENT
Start: 2017-06-22 | End: 2017-06-25

## 2017-06-22 RX ORDER — GLIMEPIRIDE 1 MG
1 TABLET ORAL
Qty: 0 | Refills: 0 | COMMUNITY

## 2017-06-22 RX ORDER — DEXTROSE 50 % IN WATER 50 %
25 SYRINGE (ML) INTRAVENOUS ONCE
Qty: 0 | Refills: 0 | Status: DISCONTINUED | OUTPATIENT
Start: 2017-06-22 | End: 2017-06-25

## 2017-06-22 RX ORDER — WARFARIN SODIUM 2.5 MG/1
1 TABLET ORAL
Qty: 0 | Refills: 0 | COMMUNITY

## 2017-06-22 RX ORDER — POMALIDOMIDE 1 MG/1
1 CAPSULE ORAL
Qty: 0 | Refills: 0 | COMMUNITY

## 2017-06-22 RX ORDER — BUDESONIDE AND FORMOTEROL FUMARATE DIHYDRATE 160; 4.5 UG/1; UG/1
2 AEROSOL RESPIRATORY (INHALATION)
Qty: 0 | Refills: 0 | COMMUNITY

## 2017-06-22 RX ORDER — FEXOFENADINE HCL 30 MG
1 TABLET ORAL
Qty: 0 | Refills: 0 | COMMUNITY

## 2017-06-22 RX ORDER — ATORVASTATIN CALCIUM 80 MG/1
40 TABLET, FILM COATED ORAL AT BEDTIME
Qty: 0 | Refills: 0 | Status: DISCONTINUED | OUTPATIENT
Start: 2017-06-22 | End: 2017-06-25

## 2017-06-22 RX ORDER — TIOTROPIUM BROMIDE 18 UG/1
1 CAPSULE ORAL; RESPIRATORY (INHALATION)
Qty: 0 | Refills: 0 | COMMUNITY

## 2017-06-22 RX ORDER — DEXTROSE 50 % IN WATER 50 %
12.5 SYRINGE (ML) INTRAVENOUS ONCE
Qty: 0 | Refills: 0 | Status: DISCONTINUED | OUTPATIENT
Start: 2017-06-22 | End: 2017-06-25

## 2017-06-22 RX ORDER — BUDESONIDE AND FORMOTEROL FUMARATE DIHYDRATE 160; 4.5 UG/1; UG/1
2 AEROSOL RESPIRATORY (INHALATION)
Qty: 0 | Refills: 0 | Status: DISCONTINUED | OUTPATIENT
Start: 2017-06-22 | End: 2017-06-25

## 2017-06-22 RX ORDER — INSULIN LISPRO 100/ML
VIAL (ML) SUBCUTANEOUS
Qty: 0 | Refills: 0 | Status: DISCONTINUED | OUTPATIENT
Start: 2017-06-22 | End: 2017-06-25

## 2017-06-22 RX ORDER — WARFARIN SODIUM 2.5 MG/1
0.5 TABLET ORAL
Qty: 0 | Refills: 0 | COMMUNITY

## 2017-06-22 RX ORDER — ASPIRIN/CALCIUM CARB/MAGNESIUM 324 MG
81 TABLET ORAL DAILY
Qty: 0 | Refills: 0 | Status: DISCONTINUED | OUTPATIENT
Start: 2017-06-22 | End: 2017-06-25

## 2017-06-22 RX ORDER — ACETAMINOPHEN 500 MG
650 TABLET ORAL EVERY 6 HOURS
Qty: 0 | Refills: 0 | Status: DISCONTINUED | OUTPATIENT
Start: 2017-06-22 | End: 2017-06-25

## 2017-06-22 RX ORDER — CELECOXIB 200 MG/1
200 CAPSULE ORAL DAILY
Qty: 0 | Refills: 0 | Status: DISCONTINUED | OUTPATIENT
Start: 2017-06-22 | End: 2017-06-25

## 2017-06-22 RX ORDER — DEXAMETHASONE 0.5 MG/5ML
4 ELIXIR ORAL
Qty: 0 | Refills: 0 | COMMUNITY

## 2017-06-22 RX ORDER — ALBUTEROL 90 UG/1
2 AEROSOL, METERED ORAL
Qty: 0 | Refills: 0 | COMMUNITY

## 2017-06-22 RX ORDER — LISINOPRIL 2.5 MG/1
40 TABLET ORAL DAILY
Qty: 0 | Refills: 0 | Status: DISCONTINUED | OUTPATIENT
Start: 2017-06-22 | End: 2017-06-23

## 2017-06-22 RX ORDER — CHOLECALCIFEROL (VITAMIN D3) 125 MCG
4000 CAPSULE ORAL
Qty: 0 | Refills: 0 | COMMUNITY

## 2017-06-22 RX ORDER — CELECOXIB 200 MG/1
200 CAPSULE ORAL DAILY
Qty: 90 | Refills: 2 | Status: ACTIVE | COMMUNITY

## 2017-06-22 RX ORDER — ASPIRIN/CALCIUM CARB/MAGNESIUM 324 MG
1 TABLET ORAL
Qty: 0 | Refills: 0 | COMMUNITY

## 2017-06-22 RX ORDER — UMECLIDINIUM 62.5 UG/1
1 AEROSOL, POWDER ORAL
Qty: 0 | Refills: 0 | COMMUNITY

## 2017-06-22 RX ORDER — ALUMINUM ZIRCONIUM TRICHLOROHYDREX GLY 0.2 G/G
1 STICK TOPICAL
Qty: 0 | Refills: 0 | COMMUNITY

## 2017-06-22 RX ORDER — ATORVASTATIN CALCIUM 80 MG/1
1 TABLET, FILM COATED ORAL
Qty: 0 | Refills: 0 | COMMUNITY

## 2017-06-22 RX ORDER — CHOLECALCIFEROL (VITAMIN D3) 125 MCG
0.5 CAPSULE ORAL
Qty: 0 | Refills: 0 | COMMUNITY

## 2017-06-22 RX ORDER — HYDROMORPHONE HYDROCHLORIDE 2 MG/ML
2 INJECTION INTRAMUSCULAR; INTRAVENOUS; SUBCUTANEOUS EVERY 4 HOURS
Qty: 0 | Refills: 0 | Status: DISCONTINUED | OUTPATIENT
Start: 2017-06-22 | End: 2017-06-25

## 2017-06-22 RX ADMIN — HYDROMORPHONE HYDROCHLORIDE 2 MILLIGRAM(S): 2 INJECTION INTRAMUSCULAR; INTRAVENOUS; SUBCUTANEOUS at 19:10

## 2017-06-22 RX ADMIN — ATORVASTATIN CALCIUM 40 MILLIGRAM(S): 80 TABLET, FILM COATED ORAL at 21:16

## 2017-06-22 RX ADMIN — Medication 200 MILLIGRAM(S): at 21:18

## 2017-06-22 RX ADMIN — HYDROMORPHONE HYDROCHLORIDE 2 MILLIGRAM(S): 2 INJECTION INTRAMUSCULAR; INTRAVENOUS; SUBCUTANEOUS at 19:45

## 2017-06-22 RX ADMIN — HEPARIN SODIUM 5000 UNIT(S): 5000 INJECTION INTRAVENOUS; SUBCUTANEOUS at 21:16

## 2017-06-22 NOTE — CONSULT NOTE ADULT - SUBJECTIVE AND OBJECTIVE BOX
Date of Consult: 6/22/2017    CHIEF COMPLAINT: syncope    HISTORY OF PRESENT ILLNESS:    Benjamin Mckeon is a 67 year old M with PMHx significant for HTN, HLD, CAD s/p 2VCABG 2011, with Sofia JOEL at that time, TAVR 4/2016, DM2, MM s/p chemo, and b/l PE on coumadin  who presents with syncope. This occured 2x since then, the first time when bending back up after tieing shoe laces and the other one last weekend when bending up after he had bent down to get instruments from toolbox. He was seen by Dr. Goldsmith in the clinic today with this complaint but given a new LBBB that wasnt present on pre TAVR EKG on 3/2017, he was sent into the hospital for a EP study. Patient is otherwise without any complaints.     Allergies    penicillin (Rash)    Intolerances      MEDICATIONS:  amaryl 1 mg daily, aspirin 81 mg daiy, ellipta, celebrex, dexamethasone, janumet, lipitor, lyrica, pepside, ramipril, warfarin       PAST MEDICAL & SURGICAL HISTORY:  Other pulmonary embolism without acute cor pulmonale, unspecified chronicity  Malignant neoplasm of ascending colon  Deep vein thrombosis (DVT) of lower extremity, unspecified chronicity, unspecified laterality, unspecified vein  Congestive heart failure, unspecified congestive heart failure chronicity, unspecified congestive heart failure type  Chronic obstructive pulmonary disease, unspecified COPD type  History of CVA (cerebrovascular accident)  Multiple myeloma, remission status unspecified  Mitral valve insufficiency, unspecified etiology  Aortic valve insufficiency, unspecified etiology  Hyperlipidemia, unspecified hyperlipidemia type  Essential hypertension  Anxiety disorder, unspecified type  Anxiety state  Type 2 diabetes mellitus without complication, with long-term current use of insulin  Coronary artery disease involving native coronary artery of native heart, angina presence unspecified  History of colon resection  S/P CABG x 2  Aortic valve replaced      FAMILY HISTORY:    SOCIAL HISTORY:    [ ] Non-smoker  [ ] Smoker  [ ] Alcohol      REVIEW OF SYSTEMS:  See HPI. Otherwise, 10 point ROS done and otherwise negative.    PHYSICAL EXAM:  T(C): 36.4, Max: 36.6 (06-22 @ 12:31)  HR: 57 (57 - 65)  BP: 127/65 (113/66 - 136/68)  RR: 14 (14 - 18)  SpO2: 99% (98% - 100%)  Wt(kg): --  I&O's Summary      Appearance: Normal	  HEENT:   Normal oral mucosa, PERRL, EOMI	  Lymphatic: No lymphadenopathy  Cardiovascular: Normal S1 S2, No JVD, No murmurs, No edema  Respiratory: Lungs clear to auscultation	  Psychiatry: A & O x 3, Mood & affect appropriate  Gastrointestinal:  Soft, Non-tender, + BS	  Skin: No rashes, No ecchymoses, No cyanosis	  Neurologic: Non-focal  Extremities: Normal range of motion, No clubbing, cyanosis or edema  Vascular: Peripheral pulses palpable 2+ bilaterally        LABS:	 	    CBC Full  -  ( 22 Jun 2017 15:04 )  WBC Count : 6.2 K/uL  Hemoglobin : 11.7 g/dL  Hematocrit : 34.6 %  Platelet Count - Automated : 127 K/uL  Mean Cell Volume : 93.1 fl  Mean Cell Hemoglobin : 31.4 pg  Mean Cell Hemoglobin Concentration : 33.7 gm/dL  Auto Neutrophil # : x  Auto Lymphocyte # : x  Auto Monocyte # : x  Auto Eosinophil # : x  Auto Basophil # : x  Auto Neutrophil % : x  Auto Lymphocyte % : x  Auto Monocyte % : x  Auto Eosinophil % : x  Auto Basophil % : x    06-22    139  |  102  |  15  ----------------------------<  127<H>  4.2   |  25  |  0.60    Ca    8.9      22 Jun 2017 15:04    TPro  6.8  /  Alb  3.7  /  TBili  0.6  /  DBili  x   /  AST  16  /  ALT  13  /  AlkPhos  60  06-22      proBNP: Serum Pro-Brain Natriuretic Peptide: 384 pg/mL (06-22 @ 15:04)    Lipid Profile:   HgA1c:   TSH:     TELEMETRY: 	    ECG:  NSR with LBBB  RADIOLOGY:  OTHER: 	    PREVIOUS DIAGNOSTIC TESTING:    [ ] Echocardiogram: 6/2017: Conclusions:  1. Mitral annular calcification, otherwise normal mitral  valve. Mild-moderate mitral regurgitation.  2. Patient status-post valve-in-valve transcatheter aortic  valve replacement. Peak transaortic valve gradient equals  14 mm Hg, mean transaortic valve gradient equals 7 mm Hg,  which is probably normal in the presence of a transcatheter  aortic valve replacement. No aortic valve regurgitation  seen.  3. Mild left ventricular enlargement.  4. Endocardium not well visualized; grossly low normal left  ventricular systolic function. Paradoxical septal motion  consistent wtih post-operative septum  5. Mild diastolic dysfunction (Stage I).  6. Normal right ventricular size and function.  *** Compared with echocardiogram of 3/16/2017, results are  similar on today's study.  [ ]  Catheterization:  [ ] Stress Test:  	  	  ASSESSMENT/PLAN:

## 2017-06-22 NOTE — H&P ADULT - NSHPPHYSICALEXAM_GEN_ALL_CORE
Vital Signs Last 24 Hrs: T(F): 97.5, HR: 67 (57 - 67), BP: 127/65 (113/66 - 136/68), RR: 15 (14 - 18), SpO2: 99% (98% - 100%) Vital Signs Last 24 Hrs: T(F): 97.5, HR: 67 (57 - 67), BP: 127/65 (113/66 - 136/68), RR: 15 (14 - 18), SpO2: 99% (98% - 100%)    GENERAL: No acute distress, well-developed  HEAD:  Atraumatic, Normocephalic  ENT: EOMI, PERRLA, conjunctiva and sclera clear, Neck supple, No JVD, moist mucosa, wearing b/l hearing aids  CHEST/LUNG: Clear to auscultation bilaterally; No wheeze, equal breath sounds bilaterally   BACK: min focal spinal tenderness  HEART: Regular rate and rhythm; + 2/6 systolic murmur loudest LUSB, no rubs, or gallops, + sternotomy scar  ABDOMEN: Soft, Nontender, Nondistended; Bowel sounds present  EXTREMITIES:  No clubbing, cyanosis, min edema to knees b/l  PSYCH: Nl behavior, nl affect  NEUROLOGY: AAOx3, non-focal, cranial nerves intact  SKIN: Normal color, No rashes, mild abrasion R elbow - dressing C/D/I    ORTHOSTATICS: Done by me: Layin/73, Sittin/69, Standin/77

## 2017-06-22 NOTE — H&P ADULT - NSHPSOCIALHISTORY_GEN_ALL_CORE
Patient is a retired , lives with wife (who is EMT), smoked from age [age], social EtOH use ~ 1 drink per week, no drug use

## 2017-06-22 NOTE — H&P ADULT - PROBLEM SELECTOR PLAN 1
-Monitor on Tele  -Repeat orthostatics is patient develops symptoms  -Avoid AVN blocking agents  -Per EP note (recs appreciated), NPO after MN and hold Coumadin for EP study tomorrow  -If EP study unremarkable would check TTE to examine TAVR  -For back pain 2/2 compression Fx 2/2 syncope c/w Dilaudid (outpatient medication)

## 2017-06-22 NOTE — H&P ADULT - PROBLEM SELECTOR PLAN 3
-The patient's VTE was provoked (immobility) and occurred 6m ago - will not bridge for this reason  -Hold Coumadin for EP study  -HSQ for DVT PPx

## 2017-06-22 NOTE — H&P ADULT - HISTORY OF PRESENT ILLNESS
67M w/ PMHx of CAD (s/p AVR, CABG 2011), DM2(HbA1c:7.1 from 3/2017), MM (on CT), PE(????), Colon Ca (s/p resection), CVA, COPD, recent TAVR (Core Valve 3/2017) who p/w   ED Triage Vitals: T:97.8, HR:65, BP:113/66, RR:18, SpO2:98% on RA  ED Course: CXR, EP consult, Admit to Tele 67M w/ PMHx of CAD (s/p bioAVR, 2vsl CABG 2011), DM2(HbA1c:7.1 from 3/2017, on orals), MM (Dx 2012, in remission, on Pomalyst), Provoked b/l PE(Dx 1/2017), Colon Ca (s/p resection), CVA, COPD(not on home O2), recent TAVR (Core Valve 3/2017) who p/w syncope.  The patient syncopized twice in April - the first episode occurred after he bent down to tie his shoes. He was standing up when he felt lightheaded and lost consciousness. His wife moved him to a chair where he lost consciousness again - this time without moving. His BP was taken twice by EMS - 90 systolic and 230 systolic per wife. He was started on an ACEI for HTN at the time and had no further syncopal episodes.  His has been off AVN blocking agents since his TAVR. 5d PTA the patient again syncopized after picking up a tool from the bottom drawer of his toolbox - no HT, unwitnessed, +LOC - hit R elbow and shoulder. He denies palpitations, CP. He was not twisting his head when any of the episodes occurred. No recent medication or dietary changes. He states he felt lightheaded at Dr. Goldsmith's Office today.  ED Triage Vitals: T:97.8, HR:65, BP:113/66, RR:18, SpO2:98% on RA  ED Course: CXR, EP consult, Admit to Tele  Note: Med rec performed by me

## 2017-06-22 NOTE — ED PROVIDER NOTE - ATTENDING CONTRIBUTION TO CARE
66 y/o m with pmhx HTN, HLD, CAD s/p 2vCABG and bovine AVR (Caesar Adame - 2011), DM on Janumet, glimeperide, MM s/p chemo now remission on maintenance oral chemo, b/l PE on coumadin (INR 2.9) chronic back pain from compression frx of lumbar spine presents from Cardiologist Dr. Shin for concern of episode of syncope.  Episode was 2 days ago, went to follow up today and was sent to ER for eval.  no vomiting. not witnessed. wife in room. believes that lasted less than 10 min. no incontinence of bowel or bladder. no fever. no chills. no chest pain.   Gen. no acute distress, Non toxic   HEENT: EOMI, mmm,   Lungs: CTAB/L no C/ W /R   CVS: S1S2   Abd; Soft non tender, non distended   Ext: no edema   Neuro AAOx3 non focal clear speech   PMD - Wilson Whitt

## 2017-06-22 NOTE — H&P ADULT - NSHPOUTPATIENTPROVIDERS_GEN_ALL_CORE
PCP: Dr. Wilson Whitt, Outpatient Cardiology: Dr. Heri Goldsmith PCP: Dr. Wilson Whitt, Outpatient Cardiology: Dr. Heri Goldsmith, Outpatient Oncologist: Dr. Geoff Lawson, Outpatient Pulm: Dr. Patel

## 2017-06-22 NOTE — H&P ADULT - PMH
Chronic obstructive pulmonary disease, unspecified COPD type  CHronic bronchitis  Coronary artery disease involving native coronary artery of native heart, angina presence unspecified  s/p 2vsl CABG  Essential hypertension    History of CVA (cerebrovascular accident)  2011  Hyperlipidemia, unspecified hyperlipidemia type    Malignant neoplasm of ascending colon  s/p resection 2016  Multiple myeloma, remission status unspecified  on Pomalyst - 21d on and 1w off  Other pulmonary embolism without acute cor pulmonale, unspecified chronicity  Provoked  Type 2 diabetes mellitus without complication, with long-term current use of insulin

## 2017-06-22 NOTE — CONSULT NOTE ADULT - ATTENDING COMMENTS
seen and examined with fellow.  I agree with H & P, A & P.  WHile syncope sounds orthostatic on arising, he has a new LBBB post TAVR and 3 episodes of LOC.  I suggested EPS to assess his HPS.  If long HV we will proceed with PPM, alternative ILR.  ALl questions answered

## 2017-06-22 NOTE — ED ADULT NURSE NOTE - OBJECTIVE STATEMENT
Pt adm to the ED due to being referred by his PMD due to abnormal electrical activity in the heart. Pt has been experiencing Syncope episodes sporically. No chest pain, no sonia arms tingling and numbness. No N/V or dizziness.

## 2017-06-22 NOTE — H&P ADULT - NSHPLABSRESULTS_GEN_ALL_CORE
Labs: Personally reviewed: CBC notable for WBC:6.2, Hb:11.7 (@ baseline), Plt:127 (@ baseline), INR:1.71, CMP unremarkable, Cr:0.6 (@ baseline), 1 set of Kailee negative, BNP:384 (prior BNP from 3/2017 was 4351), HbA1c:7.1 from 3/2017    CXR: Personally reviewed: Impression: Clear lungs    EKG: Personally reviewed:  ______    TTE from 4/14/2017: s/p valve-in-valve TAVR, mild-mod MR, nl LA, low nl LVSF, Stage I diastolic dysfunction Labs: Personally reviewed: CBC notable for WBC:6.2, Hb:11.7 (@ baseline), Plt:127 (@ baseline), INR:1.71, CMP unremarkable, Cr:0.6 (@ baseline), 1 set of Kailee negative, BNP:384 (prior BNP from 3/2017 was 4351), HbA1c:7.1 from 3/2017    CXR: Personally reviewed: Impression: Clear lungs    EKG: Personally reviewed: NSR @ 61, KY:172 (KY from 3/16/2017 was 158), QRSd:160 (QRSd from 3/2017 was 156), LBBB morphology, does not meet Sgarbosa's. The TWIs that were present in lead III, aVf in 3/2017 have resolved. No dropped beats    TTE from 4/14/2017: s/p valve-in-valve TAVR, mild-mod MR, nl LA, low nl LVSF, Stage I diastolic dysfunction Labs: Personally reviewed: CBC notable for WBC:6.2, Hb:11.7 (@ baseline), Plt:127 (@ baseline), INR:1.71, CMP unremarkable, Cr:0.6 (@ baseline), 1 set of Kailee negative, BNP:384 (prior BNP from 3/2017 was 4351), HbA1c:7.1 from 3/2017    CXR: Personally reviewed: Impression: Clear lungs    EKG: Personally reviewed: NSR @ 61, MI:172 (MI from 3/16/2017 was 158), QRSd:160 (QRSd from 3/2017 was 156), LBBB morphology, does not meet Sgarbosa's. The TWIs that were present in lead III, aVf in 3/2017 have resolved. No dropped beats. Note: the patient's LBBB was not present 3/2/2017 - TAVR was done 3/2017    TTE from 4/14/2017: s/p valve-in-valve TAVR, mild-mod MR, nl LA, low nl LVSF, Stage I diastolic dysfunction

## 2017-06-22 NOTE — H&P ADULT - PROBLEM SELECTOR PLAN 6
-2/2 chronic bronchitis  -Patient is on Breo and Incruse as outpatient - d/w Pharmacy - will use Spiriva and Symbicort

## 2017-06-22 NOTE — ED PROVIDER NOTE - MEDICAL DECISION MAKING DETAILS
67M hx multiple medical comorbidities sent in for admission after episodes of syncope.  VSS upon presentation.  ekg demonstrates LBBB unchanged compared to prior.  labs including h/h, lytes, cardiac enzymes,  chest xray, EP consult, reassess

## 2017-06-22 NOTE — H&P ADULT - ASSESSMENT
67M w/ PMHx of CAD (s/p bioAVR, 2vsl CABG 2011), DM2(HbA1c:7.1 from 3/2017, on orals), MM (Dx 2012, in remission, on Pomalyst), Provoked b/l PE(Dx 1/2017), Colon Ca (s/p resection), CVA, COPD(not on home O2), recent TAVR (Core Valve 3/2017) who p/w syncope. This is the patient's 3rd episode. Based on symptoms suspect 2/2 orthostasis however orthostatics negative on my exam. Story not c/w carotid sinus hypersensitivity. Given new LBBB post TAVR conduction disease may also be contributing to syncope. No evidence of block on EKG. Pt not on AVN blocking agents. Pomalyst not known to cause conduction disease. Murmus on exa not c/w severe AS so doubt valvular pathology explains syncope.

## 2017-06-22 NOTE — CONSULT NOTE ADULT - ASSESSMENT
67 year old M with PMHx significant for HTN, HLD, CAD s/p 2VCABG 2011, with Sofia JOEL at that time, TAVR 4/2016, DM2, MM s/p chemo, and b/l PE on coumadin  who presents with syncope.

## 2017-06-22 NOTE — H&P ADULT - NSHPREVIEWOFSYSTEMS_GEN_ALL_CORE
REVIEW OF SYSTEMS:    CONSTITUTIONAL: No weakness, fevers or chills  EYES/ENT: No visual changes;  No dysphagia  NECK: No pain or stiffness  RESPIRATORY: No cough, wheezing, hemoptysis; No shortness of breath  CARDIOVASCULAR: No chest pain or palpitations; mid lower extremity edema  GASTROINTESTINAL: No abdominal or epigastric pain. No nausea, vomiting, or hematemesis; No constipation. Occasional diarrhea - self resolves, No melena or hematochezia.  BACK: + back pain - sustained compression Fx of spine after syncopal event in April  GENITOURINARY: No dysuria, frequency or hematuria  NEUROLOGICAL: No numbness or weakness  SKIN: No itching, burning, rashes, or lesions   All other review of systems is negative unless indicated above.

## 2017-06-22 NOTE — CONSULT NOTE ADULT - PROBLEM SELECTOR RECOMMENDATION 9
Syncope is situational in description, while bending over, which suggests an element of dysautonomia. However, also with new LBBB after TAVR recently which could suggest underlying conduction issue as well.   -continue telemetry monitoring, obtain orthostatics   -plan for EP conduction tomorrow  -keep NPO at midnight  -check INR given on warfarin, hold oral for now  -EP team to follow, plan discussed with primary team.     Omero Olson MD  [id number]

## 2017-06-22 NOTE — H&P ADULT - PROBLEM SELECTOR PLAN 4
-Hold oral agents as inpatient  -HbA1c was checked in March, will not repeat at this time  -Check FS and give HISS qAC and qHS  -Consistent Carb diet  -c/w Lyrica for DM neuropathy

## 2017-06-22 NOTE — H&P ADULT - PSH
Aortic valve replaced  bio AVR w/ CABG and then TAVR (Core) 3/2017  History of colon resection  2016  S/P CABG x 2

## 2017-06-22 NOTE — PATIENT PROFILE ADULT. - TEACHING/LEARNING LEARNING PREFERENCES
group instruction/written material/pictorial/skill demonstration/computer/internet/individual instruction/video/verbal instruction/audio

## 2017-06-22 NOTE — ED PROVIDER NOTE - OBJECTIVE STATEMENT
67M HTN, HLD, CAD s/p 2vCABG and bovine AVR (Caesar Adame - 2011), DM on Janumet, glimeperide, MM s/p chemo now remission on maintenance oral chemo, b/l PE on coumadin (INR 2.9) chronic back pain from compression frx of lumbar spine presents from Cardiologist Dr. Shin after evaluation for syncope 4 days ago while bending over.  Preceding lightheadedness.  2 similar episodes in April, all while bending over.  No head trauma, but reports some neck stiffness.  No new back pain.  Denies fever/chills, chest pain, shortness, nausea/vomiting.  ASA 81mg last night.    Cards - Agus   PMD - Wilson Whitt

## 2017-06-23 LAB
ALBUMIN SERPL ELPH-MCNC: 3.2 G/DL — LOW (ref 3.3–5)
ALP SERPL-CCNC: 52 U/L — SIGNIFICANT CHANGE UP (ref 40–120)
ALT FLD-CCNC: 11 U/L RC — SIGNIFICANT CHANGE UP (ref 10–45)
ANION GAP SERPL CALC-SCNC: 11 MMOL/L — SIGNIFICANT CHANGE UP (ref 5–17)
ANION GAP SERPL CALC-SCNC: 12 MMOL/L — SIGNIFICANT CHANGE UP (ref 5–17)
APTT BLD: 30.3 SEC — SIGNIFICANT CHANGE UP (ref 27.5–37.4)
AST SERPL-CCNC: 13 U/L — SIGNIFICANT CHANGE UP (ref 10–40)
BASE EXCESS BLDA CALC-SCNC: 1.1 MMOL/L — SIGNIFICANT CHANGE UP (ref -2–2)
BASE EXCESS BLDV CALC-SCNC: 0.5 MMOL/L — SIGNIFICANT CHANGE UP (ref -2–2)
BILIRUB SERPL-MCNC: 0.7 MG/DL — SIGNIFICANT CHANGE UP (ref 0.2–1.2)
BLD GP AB SCN SERPL QL: NEGATIVE — SIGNIFICANT CHANGE UP
BUN SERPL-MCNC: 12 MG/DL — SIGNIFICANT CHANGE UP (ref 7–23)
BUN SERPL-MCNC: 13 MG/DL — SIGNIFICANT CHANGE UP (ref 7–23)
CALCIUM SERPL-MCNC: 8.2 MG/DL — LOW (ref 8.4–10.5)
CALCIUM SERPL-MCNC: 9 MG/DL — SIGNIFICANT CHANGE UP (ref 8.4–10.5)
CHLORIDE SERPL-SCNC: 102 MMOL/L — SIGNIFICANT CHANGE UP (ref 96–108)
CHLORIDE SERPL-SCNC: 102 MMOL/L — SIGNIFICANT CHANGE UP (ref 96–108)
CO2 BLDA-SCNC: 26 MMOL/L — SIGNIFICANT CHANGE UP (ref 22–30)
CO2 BLDV-SCNC: 27 MMOL/L — SIGNIFICANT CHANGE UP (ref 22–30)
CO2 SERPL-SCNC: 23 MMOL/L — SIGNIFICANT CHANGE UP (ref 22–31)
CO2 SERPL-SCNC: 24 MMOL/L — SIGNIFICANT CHANGE UP (ref 22–31)
CREAT SERPL-MCNC: 0.62 MG/DL — SIGNIFICANT CHANGE UP (ref 0.5–1.3)
CREAT SERPL-MCNC: 0.65 MG/DL — SIGNIFICANT CHANGE UP (ref 0.5–1.3)
GAS PNL BLDA: SIGNIFICANT CHANGE UP
GAS PNL BLDV: SIGNIFICANT CHANGE UP
GLUCOSE SERPL-MCNC: 101 MG/DL — HIGH (ref 70–99)
GLUCOSE SERPL-MCNC: 137 MG/DL — HIGH (ref 70–99)
HCO3 BLDA-SCNC: 25 MMOL/L — SIGNIFICANT CHANGE UP (ref 21–29)
HCO3 BLDV-SCNC: 26 MMOL/L — SIGNIFICANT CHANGE UP (ref 21–29)
HCT VFR BLD CALC: 32 % — LOW (ref 39–50)
HCT VFR BLD CALC: 34.8 % — LOW (ref 39–50)
HCT VFR BLD CALC: 34.8 % — LOW (ref 39–50)
HGB BLD-MCNC: 11 G/DL — LOW (ref 13–17)
HGB BLD-MCNC: 11.7 G/DL — LOW (ref 13–17)
HGB BLD-MCNC: 11.8 G/DL — LOW (ref 13–17)
HOROWITZ INDEX BLDA+IHG-RTO: 21 — SIGNIFICANT CHANGE UP
HOROWITZ INDEX BLDV+IHG-RTO: 21 — SIGNIFICANT CHANGE UP
INR BLD: 1.83 RATIO — HIGH (ref 0.88–1.16)
INR BLD: 1.83 RATIO — HIGH (ref 0.88–1.16)
MAGNESIUM SERPL-MCNC: 1.9 MG/DL — SIGNIFICANT CHANGE UP (ref 1.6–2.6)
MCHC RBC-ENTMCNC: 31.2 PG — SIGNIFICANT CHANGE UP (ref 27–34)
MCHC RBC-ENTMCNC: 31.4 PG — SIGNIFICANT CHANGE UP (ref 27–34)
MCHC RBC-ENTMCNC: 31.9 PG — SIGNIFICANT CHANGE UP (ref 27–34)
MCHC RBC-ENTMCNC: 33.7 GM/DL — SIGNIFICANT CHANGE UP (ref 32–36)
MCHC RBC-ENTMCNC: 33.8 GM/DL — SIGNIFICANT CHANGE UP (ref 32–36)
MCHC RBC-ENTMCNC: 34.4 GM/DL — SIGNIFICANT CHANGE UP (ref 32–36)
MCV RBC AUTO: 92.5 FL — SIGNIFICANT CHANGE UP (ref 80–100)
MCV RBC AUTO: 92.8 FL — SIGNIFICANT CHANGE UP (ref 80–100)
MCV RBC AUTO: 92.8 FL — SIGNIFICANT CHANGE UP (ref 80–100)
PCO2 BLDA: 39 MMHG — SIGNIFICANT CHANGE UP (ref 32–46)
PCO2 BLDV: 46 MMHG — SIGNIFICANT CHANGE UP (ref 35–50)
PH BLDA: 7.43 — SIGNIFICANT CHANGE UP (ref 7.35–7.45)
PH BLDV: 7.37 — SIGNIFICANT CHANGE UP (ref 7.35–7.45)
PHOSPHATE SERPL-MCNC: 3.4 MG/DL — SIGNIFICANT CHANGE UP (ref 2.5–4.5)
PLATELET # BLD AUTO: 125 K/UL — LOW (ref 150–400)
PLATELET # BLD AUTO: 128 K/UL — LOW (ref 150–400)
PLATELET # BLD AUTO: 140 K/UL — LOW (ref 150–400)
PO2 BLDA: 58 MMHG — LOW (ref 74–108)
PO2 BLDV: 33 MMHG — SIGNIFICANT CHANGE UP (ref 25–45)
POTASSIUM SERPL-MCNC: 3.8 MMOL/L — SIGNIFICANT CHANGE UP (ref 3.5–5.3)
POTASSIUM SERPL-MCNC: 4.3 MMOL/L — SIGNIFICANT CHANGE UP (ref 3.5–5.3)
POTASSIUM SERPL-SCNC: 3.8 MMOL/L — SIGNIFICANT CHANGE UP (ref 3.5–5.3)
POTASSIUM SERPL-SCNC: 4.3 MMOL/L — SIGNIFICANT CHANGE UP (ref 3.5–5.3)
PROT SERPL-MCNC: 5.8 G/DL — LOW (ref 6–8.3)
PROTHROM AB SERPL-ACNC: 20 SEC — HIGH (ref 9.8–12.7)
PROTHROM AB SERPL-ACNC: 20.2 SEC — HIGH (ref 9.8–12.7)
RBC # BLD: 3.45 M/UL — LOW (ref 4.2–5.8)
RBC # BLD: 3.75 M/UL — LOW (ref 4.2–5.8)
RBC # BLD: 3.76 M/UL — LOW (ref 4.2–5.8)
RBC # FLD: 14.1 % — SIGNIFICANT CHANGE UP (ref 10.3–14.5)
RBC # FLD: 14.1 % — SIGNIFICANT CHANGE UP (ref 10.3–14.5)
RBC # FLD: 14.2 % — SIGNIFICANT CHANGE UP (ref 10.3–14.5)
RH IG SCN BLD-IMP: NEGATIVE — SIGNIFICANT CHANGE UP
SAO2 % BLDA: 89 % — LOW (ref 92–96)
SAO2 % BLDV: 55 % — LOW (ref 67–88)
SODIUM SERPL-SCNC: 137 MMOL/L — SIGNIFICANT CHANGE UP (ref 135–145)
SODIUM SERPL-SCNC: 137 MMOL/L — SIGNIFICANT CHANGE UP (ref 135–145)
WBC # BLD: 5.8 K/UL — SIGNIFICANT CHANGE UP (ref 3.8–10.5)
WBC # BLD: 6 K/UL — SIGNIFICANT CHANGE UP (ref 3.8–10.5)
WBC # BLD: 8.3 K/UL — SIGNIFICANT CHANGE UP (ref 3.8–10.5)
WBC # FLD AUTO: 5.8 K/UL — SIGNIFICANT CHANGE UP (ref 3.8–10.5)
WBC # FLD AUTO: 6 K/UL — SIGNIFICANT CHANGE UP (ref 3.8–10.5)
WBC # FLD AUTO: 8.3 K/UL — SIGNIFICANT CHANGE UP (ref 3.8–10.5)

## 2017-06-23 PROCEDURE — 93010 ELECTROCARDIOGRAM REPORT: CPT | Mod: 77

## 2017-06-23 PROCEDURE — 33208 INSRT HEART PM ATRIAL & VENT: CPT | Mod: 59

## 2017-06-23 PROCEDURE — 74176 CT ABD & PELVIS W/O CONTRAST: CPT | Mod: 26

## 2017-06-23 PROCEDURE — 93010 ELECTROCARDIOGRAM REPORT: CPT

## 2017-06-23 PROCEDURE — 99233 SBSQ HOSP IP/OBS HIGH 50: CPT

## 2017-06-23 PROCEDURE — 99152 MOD SED SAME PHYS/QHP 5/>YRS: CPT

## 2017-06-23 PROCEDURE — 93306 TTE W/DOPPLER COMPLETE: CPT | Mod: 26

## 2017-06-23 PROCEDURE — 93620 COMP EP EVL R AT VEN PAC&REC: CPT | Mod: 26

## 2017-06-23 PROCEDURE — 71010: CPT | Mod: 26

## 2017-06-23 RX ORDER — VANCOMYCIN HCL 1 G
1000 VIAL (EA) INTRAVENOUS ONCE
Qty: 0 | Refills: 0 | Status: COMPLETED | OUTPATIENT
Start: 2017-06-23 | End: 2017-06-23

## 2017-06-23 RX ORDER — ATROPINE SULFATE 0.1 MG/ML
0.5 SYRINGE (ML) INJECTION ONCE
Qty: 0 | Refills: 0 | Status: COMPLETED | OUTPATIENT
Start: 2017-06-23 | End: 2017-06-23

## 2017-06-23 RX ORDER — SODIUM CHLORIDE 9 MG/ML
1000 INJECTION INTRAMUSCULAR; INTRAVENOUS; SUBCUTANEOUS ONCE
Qty: 0 | Refills: 0 | Status: COMPLETED | OUTPATIENT
Start: 2017-06-23 | End: 2017-06-23

## 2017-06-23 RX ORDER — SODIUM CHLORIDE 9 MG/ML
500 INJECTION INTRAMUSCULAR; INTRAVENOUS; SUBCUTANEOUS ONCE
Qty: 0 | Refills: 0 | Status: COMPLETED | OUTPATIENT
Start: 2017-06-23 | End: 2017-06-23

## 2017-06-23 RX ORDER — DIPHENHYDRAMINE HCL 50 MG
25 CAPSULE ORAL ONCE
Qty: 0 | Refills: 0 | Status: COMPLETED | OUTPATIENT
Start: 2017-06-23 | End: 2017-06-23

## 2017-06-23 RX ORDER — FAMOTIDINE 10 MG/ML
20 INJECTION INTRAVENOUS ONCE
Qty: 0 | Refills: 0 | Status: COMPLETED | OUTPATIENT
Start: 2017-06-23 | End: 2017-06-23

## 2017-06-23 RX ORDER — PHENYLEPHRINE HYDROCHLORIDE 10 MG/ML
0.4 INJECTION INTRAVENOUS
Qty: 80 | Refills: 0 | Status: DISCONTINUED | OUTPATIENT
Start: 2017-06-23 | End: 2017-06-23

## 2017-06-23 RX ADMIN — Medication 25 MILLIGRAM(S): at 17:08

## 2017-06-23 RX ADMIN — HYDROMORPHONE HYDROCHLORIDE 2 MILLIGRAM(S): 2 INJECTION INTRAMUSCULAR; INTRAVENOUS; SUBCUTANEOUS at 02:36

## 2017-06-23 RX ADMIN — FAMOTIDINE 20 MILLIGRAM(S): 10 INJECTION INTRAVENOUS at 17:37

## 2017-06-23 RX ADMIN — Medication 250 MILLIGRAM(S): at 23:00

## 2017-06-23 RX ADMIN — CELECOXIB 200 MILLIGRAM(S): 200 CAPSULE ORAL at 22:23

## 2017-06-23 RX ADMIN — HYDROMORPHONE HYDROCHLORIDE 2 MILLIGRAM(S): 2 INJECTION INTRAMUSCULAR; INTRAVENOUS; SUBCUTANEOUS at 06:39

## 2017-06-23 RX ADMIN — Medication 81 MILLIGRAM(S): at 17:30

## 2017-06-23 RX ADMIN — HYDROMORPHONE HYDROCHLORIDE 2 MILLIGRAM(S): 2 INJECTION INTRAMUSCULAR; INTRAVENOUS; SUBCUTANEOUS at 18:24

## 2017-06-23 RX ADMIN — LISINOPRIL 40 MILLIGRAM(S): 2.5 TABLET ORAL at 05:15

## 2017-06-23 RX ADMIN — Medication 5000 UNIT(S): at 21:18

## 2017-06-23 RX ADMIN — Medication 200 MILLIGRAM(S): at 05:15

## 2017-06-23 RX ADMIN — HEPARIN SODIUM 5000 UNIT(S): 5000 INJECTION INTRAVENOUS; SUBCUTANEOUS at 21:19

## 2017-06-23 RX ADMIN — Medication 125 MILLIGRAM(S): at 17:37

## 2017-06-23 RX ADMIN — PHENYLEPHRINE HYDROCHLORIDE 13.95 MICROGRAM(S)/KG/MIN: 10 INJECTION INTRAVENOUS at 17:08

## 2017-06-23 RX ADMIN — ATORVASTATIN CALCIUM 40 MILLIGRAM(S): 80 TABLET, FILM COATED ORAL at 21:17

## 2017-06-23 RX ADMIN — HYDROMORPHONE HYDROCHLORIDE 2 MILLIGRAM(S): 2 INJECTION INTRAMUSCULAR; INTRAVENOUS; SUBCUTANEOUS at 05:30

## 2017-06-23 RX ADMIN — Medication 200 MILLIGRAM(S): at 17:30

## 2017-06-23 RX ADMIN — SODIUM CHLORIDE 1000 MILLILITER(S): 9 INJECTION INTRAMUSCULAR; INTRAVENOUS; SUBCUTANEOUS at 16:36

## 2017-06-23 RX ADMIN — HYDROMORPHONE HYDROCHLORIDE 2 MILLIGRAM(S): 2 INJECTION INTRAMUSCULAR; INTRAVENOUS; SUBCUTANEOUS at 17:30

## 2017-06-23 RX ADMIN — HYDROMORPHONE HYDROCHLORIDE 2 MILLIGRAM(S): 2 INJECTION INTRAMUSCULAR; INTRAVENOUS; SUBCUTANEOUS at 01:29

## 2017-06-23 RX ADMIN — CELECOXIB 200 MILLIGRAM(S): 200 CAPSULE ORAL at 21:17

## 2017-06-23 NOTE — CHART NOTE - NSCHARTNOTEFT_GEN_A_CORE
====================  NEW EVENTS:  ====================  Pt off phenylephrine, asymptomatic     ====================  SUMMARY:  ====================  67M w/ PMHx of CAD (s/p bioAVR, CABG x2 vessels 2011), T2DM (HbA1c of 7.1 from 3/2017, on oral hypoglycemic agents), MM (Dx 2012, in remission, on Pomalyst), provoked bilateral PE(Dx 1/2017), Colon Ca (s/p resection), CVA, COPD(not on home O2), recent TAVR (Core Valve 3/2017) who p/w syncope, found to have new LBBB, s/p PPM course c/b hypotension requiring pressor support.     ====================  VITALS:  ====================    ICU Vital Signs Last 24 Hrs  T(C): 37.4, Max: 37.4 (06-23 @ 19:00)  T(F): 99.4, Max: 99.4 (06-23 @ 19:00)  HR: 64 (64 - 98)  BP: 100/47 (69/41 - 145/70)  BP(mean): 78 (61 - 101)  ABP: --  ABP(mean): --  RR: 21 (12 - 28)  SpO2: 98% (91% - 99%)      I&O's Summary  I & Os for 24h ending 23 Jun 2017 07:00  =============================================  IN: 120 ml / OUT: 0 ml / NET: 120 ml    I & Os for current day (as of 23 Jun 2017 23:05)  =============================================  IN: 881 ml / OUT: 950 ml / NET: -69 ml      Adult Advanced Hemodynamics Last 24 Hrs  CVP(mm Hg): --  CVP(cm H2O): --  CO: --  CI: --  PA: --  PA(mean): --  PCWP: --  SVR: --  SVRI: --  PVR: --  PVRI: --        ====================  LABS:  ====================                          11.7   8.3   )-----------( 140      ( 23 Jun 2017 17:45 )             34.8     06-23    137  |  102  |  12  ----------------------------<  137<H>  3.8   |  24  |  0.62    Ca    8.2<L>      23 Jun 2017 14:16  Phos  3.4     06-23  Mg     1.9     06-23    TPro  5.8<L>  /  Alb  3.2<L>  /  TBili  0.7  /  DBili  x   /  AST  13  /  ALT  11  /  AlkPhos  52  06-23    PT/INR - ( 23 Jun 2017 14:16 )   PT: 20.0 sec;   INR: 1.83 ratio         PTT - ( 23 Jun 2017 14:16 )  PTT:30.3 sec    ABG - ( 23 Jun 2017 14:15 )  pH: 7.43  /  pCO2: 39    /  pO2: 58    / HCO3: 25    / Base Excess: 1.1   /  SaO2: 89                  ====================  PLAN:  ====================  1) hypotension, now resloved. Unclear etiology. Contrast allergy vs. vasovagal.  - f/u offical read of  CT A/P  - lisinopril d/pratibha in setting of hypotension, will consider restarting tomorrow if tolerated

## 2017-06-23 NOTE — PROGRESS NOTE ADULT - SUBJECTIVE AND OBJECTIVE BOX
Patient is a 67y old  Male who presents with a chief complaint of "I passed out again" (22 Jun 2017 17:19)        SUBJECTIVE / OVERNIGHT EVENTS: no cp/sob/palpitations. patient does have some back pain.   tele - SR 70-80 no events overnight      MEDICATIONS  (STANDING):  heparin  Injectable 5000Unit(s) SubCutaneous every 8 hours  insulin lispro (HumaLOG) corrective regimen sliding scale  SubCutaneous three times a day before meals  insulin lispro (HumaLOG) corrective regimen sliding scale  SubCutaneous at bedtime  dextrose 50% Injectable 12.5Gram(s) IV Push once  dextrose 50% Injectable 25Gram(s) IV Push once  aspirin enteric coated 81milliGRAM(s) Oral daily  celecoxib 200milliGRAM(s) Oral daily  lisinopril 40milliGRAM(s) Oral daily  pregabalin 200milliGRAM(s) Oral two times a day  atorvastatin 40milliGRAM(s) Oral at bedtime  cholecalciferol 5000Unit(s) Oral daily  buDESOnide  80 MICROgram(s)/formoterol 4.5 MICROgram(s) Inhaler 2Puff(s) Inhalation two times a day  tiotropium 18 MICROgram(s) Capsule 1Capsule(s) Inhalation daily    MEDICATIONS  (PRN):  acetaminophen   Tablet. 650milliGRAM(s) Oral every 6 hours PRN Mild Pain (1 - 3)  dextrose Gel 1Dose(s) Oral once PRN Blood Glucose LESS THAN 70 milliGRAM(s)/deciliter  glucagon  Injectable 1milliGRAM(s) IntraMuscular once PRN Glucose LESS THAN 70 milligrams/deciliter  HYDROmorphone   Tablet 2milliGRAM(s) Oral every 4 hours PRN Moderate Pain (4 - 6)      Vital Signs Last 24 Hrs  T(C): 36.9, Max: 36.9 (06-22 @ 20:47)  HR: 64 (57 - 73)  BP: 145/70 (113/66 - 145/70)  RR: 18 (14 - 18)  SpO2: 96% (96% - 100%)  Wt(kg): --  CAPILLARY BLOOD GLUCOSE  150 (23 Jun 2017 07:59)  124 (22 Jun 2017 20:47)    I&O's Summary    I & Os for current day (as of 23 Jun 2017 09:10)  =============================================  IN: 120 ml / OUT: 0 ml / NET: 120 ml      PHYSICAL EXAM:  GENERAL: NAD  HEAD:  Atraumatic, Normocephalic  EYES: conjunctiva and sclera clear  NECK: No JVD  CHEST/LUNG: CTA b/l  HEART: S1 S2 RRR  ABDOMEN: +BS Soft, NT/ND  EXTREMITIES:  2+ DP Pulses, No c/c/e  NEUROLOGY: AAOx3, no focal deficits   SKIN: No rashes or lesions    LABS:                        11.8   6.0   )-----------( 128      ( 23 Jun 2017 06:05 )             34.8     06-23    137  |  102  |  13  ----------------------------<  101<H>  4.3   |  23  |  0.65    Ca    9.0      23 Jun 2017 06:05  Phos  3.4     06-23  Mg     1.9     06-23    TPro  6.8  /  Alb  3.7  /  TBili  0.6  /  DBili  x   /  AST  16  /  ALT  13  /  AlkPhos  60  06-22    PT/INR - ( 23 Jun 2017 06:05 )   PT: 20.2 sec;   INR: 1.83 ratio         PTT - ( 22 Jun 2017 17:41 )  PTT:33.1 sec  CARDIAC MARKERS ( 22 Jun 2017 15:04 )  x     / <0.01 ng/mL / 57 U/L / x     / 2.5 ng/mL          RADIOLOGY & ADDITIONAL TESTS:    Imaging Personally Reviewed:  Consultant(s) Notes Reviewed:    Care Discussed with Consultants/Other Providers:

## 2017-06-23 NOTE — CHART NOTE - NSCHARTNOTEFT_GEN_A_CORE
called by RN after pt returned from EP earlier today  s/p PPM placement for hypotension. Pt seen in bed, b/p 78/50, done manually. Left anterior chest wall site C/D/I, no visible hematoma noted. Pt symptomatic. Pt verbalized that he felt like he was going to pass out. RRT called. See note.

## 2017-06-23 NOTE — CHART NOTE - NSCHARTNOTEFT_GEN_A_CORE
RRT called for hypotension. Pt s/p PPM about an hour ago and now SBP in 70s. Pt is awake, alert, following commands, but reports having nausea, dizziness, and feels like he is about to pass out. Given 1L NS bolus RRT called for hypotension. Pt s/p PPM about an hour ago and now SBP in 70s. Pt is awake, alert, following commands, but reports having nausea, dizziness, and feels like he is about to pass out. Afebrile, HR in 60s-70s, O2 sat 97% RA. Given 1L NS bolus with improvement in SBP to 80s. Cardiology team (fellow, attending) at bedside, obtained CXR to r/o pneumothorax. Given atropine 0.5 mg IV x 2 for suspected vagal response post PPM placement per Cardiology recs. STAT TTE at bedside with no obvious signs of tamponade. Given a second liter of NS, SBPs continued to remain in mid 80s sytolic, pt reports improvement in dizziness. CBC, BMP, Coags, T&S sent during RRT. CTAP urgent also ordered to r/o RP bleed, called CT and pt on schedule in 30 mins. Pt is now transferred to CCU for further management and monitoring. CCU team to follow up with CT as to when pt can be brought down. Spoke to hospitalist attending (Dr. Urban) and updated regarding plan.

## 2017-06-23 NOTE — CHART NOTE - NSCHARTNOTEFT_GEN_A_CORE
ACCEPT NOTE   ========================================  67M w/ PMHx of CAD (s/p bioAVR, CABG x2 vessels 2011), T2DM (HbA1c of 7.1 from 3/2017, on oral hypoglycemic agents), MM (Dx 2012, in remission, on Pomalyst), provoked bilateral PE(Dx 1/2017), Colon Ca (s/p resection), CVA, COPD(not on home O2), recent TAVR (Core Valve 3/2017) who p/w syncope.  The patient syncopized twice in April - the first episode occurred after he bent down to tie his shoes. He was standing up when he felt lightheaded and lost consciousness. His wife moved him to a chair where he lost consciousness again - this time without moving. His BP was taken twice by EMS - 90 systolic and 230 systolic per wife. He was started on an ACEI for HTN at the time and had no further syncopal episodes.  He has been off AVN blocking agents since his TAVR. 5d PTA the patient again syncopized after picking up a tool from the bottom drawer of his toolbox - no head trauma, unwitnessed, +LOC - hit R elbow and shoulder. He denies palpitations, CP. He was not twisting his head when any of the episodes occurred. No recent medication or dietary changes. He stated he felt lightheaded at Dr. Goldsmith's Office.     ED Triage Vitals: T:97.8, HR:65, BP:113/66, RR:18, SpO2:98% on RA  Pt's orthostatics were negative.   Labs notable for anemia with H/H of 11.7/34.6, and thrombocytopenia with platelets of 127, and elevated monocytes (20%). INR elevated at 1.71. Troponin was negative   CXR found clear lungs. EKG found LBBB   EP team consulted. Coumadin was held for EP study.    About 1 hour after EP study, pt spontaneously bcame hypotensive to SBP 70s, with HR at baseline 60s-70s, and presyncope. RRT was called. Bedside TTE found no effusion. CXR obtained found clear lungs. Pt given 2L IVNS and 0.5mg atropine x2 and transferred to CCU.  At CCU, patient was persistently hypotensive to SBP 70s, thus pt started on phenylephrine and central line in R femoral placed. CT A/P ordered to rule out RP bleed. Preliminary read was negative.  Pt is currently complaining of pain in shoulders.     Hospital Medications:  heparin  Injectable 5000Unit(s) SubCutaneous every 8 hours  acetaminophen   Tablet. 650milliGRAM(s) Oral every 6 hours PRN  insulin lispro (HumaLOG) corrective regimen sliding scale  SubCutaneous three times a day before meals  insulin lispro (HumaLOG) corrective regimen sliding scale  SubCutaneous at bedtime  dextrose Gel 1Dose(s) Oral once PRN  dextrose 50% Injectable 12.5Gram(s) IV Push once  dextrose 50% Injectable 25Gram(s) IV Push once  glucagon  Injectable 1milliGRAM(s) IntraMuscular once PRN  aspirin enteric coated 81milliGRAM(s) Oral daily  celecoxib 200milliGRAM(s) Oral daily  HYDROmorphone   Tablet 2milliGRAM(s) Oral every 4 hours PRN  lisinopril 40milliGRAM(s) Oral daily  pregabalin 200milliGRAM(s) Oral two times a day  atorvastatin 40milliGRAM(s) Oral at bedtime  cholecalciferol 5000Unit(s) Oral daily  buDESOnide  80 MICROgram(s)/formoterol 4.5 MICROgram(s) Inhaler 2Puff(s) Inhalation two times a day  tiotropium 18 MICROgram(s) Capsule 1Capsule(s) Inhalation daily  atropine Injectable 0.5milliGRAM(s) IV Push once  vancomycin  IVPB 1000milliGRAM(s) IV Intermittent once  phenylephrine    Infusion 0.4MICROgram(s)/kG/Min IV Continuous <Continuous>      ICU Vital Signs Last 24 Hrs  T(C): 37.4, Max: 37.4 (06-23 @ 19:00)  T(F): 99.4, Max: 99.4 (06-23 @ 19:00)  HR: 88 (64 - 98)  BP: 122/57 (69/41 - 145/70)  BP(mean): 75 (61 - 86)  ABP: --  ABP(mean): --  RR: 18 (12 - 22)  SpO2: 94% (91% - 99%)      PHYSICAL EXAM:   Vital Signs:  Vital Signs Last 24 Hrs  T(C): 37.4, Max: 37.4 (06-23 @ 19:00)  T(F): 99.4, Max: 99.4 (06-23 @ 19:00)  HR: 88 (64 - 98)  BP: 122/57 (69/41 - 145/70)  BP(mean): 75 (61 - 86)  RR: 18 (12 - 22)  SpO2: 94% (91% - 99%)  Daily     Daily     I&O's Detail  I & Os for 24h ending 23 Jun 2017 07:00  =============================================  IN:    Oral Fluid: 120 ml    Total IN: 120 ml  ---------------------------------------------  OUT:    Total OUT: 0 ml  ---------------------------------------------  Total NET: 120 ml    I & Os for current day (as of 23 Jun 2017 19:46)  =============================================  IN:    Sodium Chloride 0.9% IV Bolus: 500 ml    Oral Fluid: 240 ml    phenylephrine   Infusion: 21 ml    Total IN: 761 ml  ---------------------------------------------  OUT:    Voided: 450 ml    Total OUT: 450 ml  ---------------------------------------------  Total NET: 311 ml      GENERAL:  Appears stated age, well-groomed, well-nourished, complaining of shoulder pain  HEENT:  NC/AT,  conjunctivae clear, sclera -anicteric, R submandular palpable lymph node  CHEST:  CTAB on anterior auscultation, no rhonci or rales  HEART:  systolic ejection murmur, normal S1S2 no edema  ABDOMEN:  Soft, non-tender, non-distended, normoactive bowel sounds,  no masses   EXTREMITIES:  no cyanosis,clubbing or edema  SKIN:  No rash/erythema/ecchymoses/petechiae/wounds/abscess/warm/dry  NEURO:  Alert, oriented,     LABS:                        11.7   8.3   )-----------( 140      ( 23 Jun 2017 17:45 )             34.8     06-23    137  |  102  |  12  ----------------------------<  137<H>  3.8   |  24  |  0.62    Ca    8.2<L>      23 Jun 2017 14:16  Phos  3.4     06-23  Mg     1.9     06-23    TPro  5.8<L>  /  Alb  3.2<L>  /  TBili  0.7  /  DBili  x   /  AST  13  /  ALT  11  /  AlkPhos  52  06-23    LIVER FUNCTIONS - ( 23 Jun 2017 14:16 )  Alb: 3.2 g/dL / Pro: 5.8 g/dL / ALK PHOS: 52 U/L / ALT: 11 U/L RC / AST: 13 U/L / GGT: x           PT/INR - ( 23 Jun 2017 14:16 )   PT: 20.0 sec;   INR: 1.83 ratio         PTT - ( 23 Jun 2017 14:16 )  PTT:30.3 sec    Imaging:    Patient name: SANCHO DIAZ  YOB: 1950   Age: 67 (M)   MR#: [id number]  Study Date: 6/23/2017  Location: [id number]: Anton Slade RDCS  Study quality: Technically fair  Referring Physician: Saranya Urban MD  Blood Pressure: 87/59 mmHg  Height: 191 cm  Weight: 91 kg  BSA: 2.2 m2  ------------------------------------------------------------------------  PROCEDURE: Transthoracic echocardiogram with 2-D, M-Mode  and complete spectral and color flow Doppler.  INDICATION: Pericardial effusion (noninflammatory) (I31.3)  ------------------------------------------------------------------------  Observations:  Mitral Valve: Normal mitral valve.  Aortic Valve/Aorta: Transcatheter aortic valve replacement.  Peak transaortic valve gradient equals 17 mm Hg, mean  transaortic valve gradient equals 10 mm Hg, which is  probably normal in the presence of a transcatheter aortic  valve replacement.  Normal aortic root.  Left Atrium: Mildly dilated left atrium.  LA volume index =  36 cc/m2.  Left Ventricle:  Overall low normal  left ventricular  systolic function.Paradoxical septal motion noted.  Right Heart: The right ventricle is not well visualized;  grossly probabaly preserved  right ventricular systolic  function. Normal tricuspid valve.  Pericardium/Pleura: No pericardial effusion.  ------------------------------------------------------------------------  Conclusions:  1. Transcatheter aortic valve replacement. Peak transaortic  valve gradient equals 17 mm Hg, mean transaortic valve  gradient equals 10 mm Hg, which is probably normal in the  presence of a transcatheter aortic valve replacement.  2.  Overall low normal  left ventricular systolic  function.Paradoxical septal motion noted.  3. The right ventricle is not well visualized; grossly  probabaly preserved  right ventricular systolic function.  4. No pericardial effusion.  ------------------------------------------------------------------------  Confirmed on  6/23/2017 - 17:02:39 by Chantel Cano M.D.      A/P:   67M w/ PMHx of CAD (s/p bioAVR, CABG x2 vessels 2011), T2DM (HbA1c of 7.1 from 3/2017, on oral hypoglycemic agents), MM (Dx 2012, in remission, on Pomalyst), provoked bilateral PE(Dx 1/2017), Colon Ca (s/p resection), CVA, COPD(not on home O2), recent TAVR (Core Valve 3/2017) who p/w syncope, found to have new LBBB, s/p PPM course c/b hypotension requiring pressor support.     Neuro- at baseline  Pulmonary- satting well on RA, will monitorm. C/w home spiriva  Cardiac- unclear etiology for hypotension- bedside echocardiogram during RRT did not reveal pericardial effusion. Tamponade appears less likely. Pt afebrile, without leukocytosis thus new SIRS unlikely. May be iatrogenic from agents used during EP procedure, or due to allergic reaction from medication.   - will give famotidine, solumedrol, benadryl  - c/w phenylephrine and downtitrate as tolerated  - will check CT A/P to r/o abdominal bleed  - lisinopril d/pratibha in setting of hypotension  CAD  - c/w lipitor, aspirin    Endo   - c/w vitaminD  - c/w insulin SS and fingerstick BS montioring    Neuro  - c/w home lyrica      GI- ACCEPT NOTE   ========================================  67M w/ PMHx of CAD (s/p bioAVR, CABG x2 vessels 2011), T2DM (HbA1c of 7.1 from 3/2017, on oral hypoglycemic agents), MM (Dx 2012, in remission, on Pomalyst), provoked bilateral PE(Dx 1/2017), Colon Ca (s/p resection), CVA, COPD(not on home O2), recent TAVR (Core Valve 3/2017) who p/w syncope.  The patient syncopized twice in April - the first episode occurred after he bent down to tie his shoes. He was standing up when he felt lightheaded and lost consciousness. His wife moved him to a chair where he lost consciousness again - this time without moving. His BP was taken twice by EMS - 90 systolic and 230 systolic per wife. He was started on an ACEI for HTN at the time and had no further syncopal episodes.  He has been off AVN blocking agents since his TAVR. 5d PTA the patient again syncopized after picking up a tool from the bottom drawer of his toolbox - no head trauma, unwitnessed, +LOC - hit R elbow and shoulder. He denies palpitations, CP. He was not twisting his head when any of the episodes occurred. No recent medication or dietary changes. He stated he felt lightheaded at Dr. Goldsmith's Office.     ED Triage Vitals: T:97.8, HR:65, BP:113/66, RR:18, SpO2:98% on RA  Pt's orthostatics were negative.   Labs notable for anemia with H/H of 11.7/34.6, and thrombocytopenia with platelets of 127, and elevated monocytes (20%). INR elevated at 1.71. Troponin was negative   CXR found clear lungs. EKG found LBBB   EP team consulted. Coumadin was held for EP study.    About 1 hour after EP study, pt spontaneously bcame hypotensive to SBP 70s, with HR at baseline 60s-70s, and presyncope. RRT was called. Bedside TTE found no effusion. CXR obtained found clear lungs. Pt given 2L IVNS and 0.5mg atropine x2 and transferred to CCU.  At CCU, patient was persistently hypotensive to SBP 70s, thus pt started on phenylephrine and central line in R femoral placed. CT A/P ordered to rule out RP bleed. Preliminary read was negative.  Pt is currently complaining of pain in shoulders.     Hospital Medications:  heparin  Injectable 5000Unit(s) SubCutaneous every 8 hours  acetaminophen   Tablet. 650milliGRAM(s) Oral every 6 hours PRN  insulin lispro (HumaLOG) corrective regimen sliding scale  SubCutaneous three times a day before meals  insulin lispro (HumaLOG) corrective regimen sliding scale  SubCutaneous at bedtime  dextrose Gel 1Dose(s) Oral once PRN  dextrose 50% Injectable 12.5Gram(s) IV Push once  dextrose 50% Injectable 25Gram(s) IV Push once  glucagon  Injectable 1milliGRAM(s) IntraMuscular once PRN  aspirin enteric coated 81milliGRAM(s) Oral daily  celecoxib 200milliGRAM(s) Oral daily  HYDROmorphone   Tablet 2milliGRAM(s) Oral every 4 hours PRN  lisinopril 40milliGRAM(s) Oral daily  pregabalin 200milliGRAM(s) Oral two times a day  atorvastatin 40milliGRAM(s) Oral at bedtime  cholecalciferol 5000Unit(s) Oral daily  buDESOnide  80 MICROgram(s)/formoterol 4.5 MICROgram(s) Inhaler 2Puff(s) Inhalation two times a day  tiotropium 18 MICROgram(s) Capsule 1Capsule(s) Inhalation daily  atropine Injectable 0.5milliGRAM(s) IV Push once  vancomycin  IVPB 1000milliGRAM(s) IV Intermittent once  phenylephrine    Infusion 0.4MICROgram(s)/kG/Min IV Continuous <Continuous>      ICU Vital Signs Last 24 Hrs  T(C): 37.4, Max: 37.4 (06-23 @ 19:00)  T(F): 99.4, Max: 99.4 (06-23 @ 19:00)  HR: 88 (64 - 98)  BP: 122/57 (69/41 - 145/70)  BP(mean): 75 (61 - 86)  ABP: --  ABP(mean): --  RR: 18 (12 - 22)  SpO2: 94% (91% - 99%)      PHYSICAL EXAM:   Vital Signs:  Vital Signs Last 24 Hrs  T(C): 37.4, Max: 37.4 (06-23 @ 19:00)  T(F): 99.4, Max: 99.4 (06-23 @ 19:00)  HR: 88 (64 - 98)  BP: 122/57 (69/41 - 145/70)  BP(mean): 75 (61 - 86)  RR: 18 (12 - 22)  SpO2: 94% (91% - 99%)  Daily     Daily     I&O's Detail  I & Os for 24h ending 23 Jun 2017 07:00  =============================================  IN:    Oral Fluid: 120 ml    Total IN: 120 ml  ---------------------------------------------  OUT:    Total OUT: 0 ml  ---------------------------------------------  Total NET: 120 ml    I & Os for current day (as of 23 Jun 2017 19:46)  =============================================  IN:    Sodium Chloride 0.9% IV Bolus: 500 ml    Oral Fluid: 240 ml    phenylephrine   Infusion: 21 ml    Total IN: 761 ml  ---------------------------------------------  OUT:    Voided: 450 ml    Total OUT: 450 ml  ---------------------------------------------  Total NET: 311 ml      GENERAL:  Appears stated age, well-groomed, well-nourished, complaining of shoulder pain  HEENT:  NC/AT,  conjunctivae clear, sclera -anicteric, R submandular palpable lymph node  CHEST:  CTAB on anterior auscultation, no rhonci or rales  HEART:  systolic ejection murmur, normal S1S2 no edema  ABDOMEN:  Soft, non-tender, non-distended, normoactive bowel sounds,  no masses   EXTREMITIES:  no cyanosis,clubbing or edema  SKIN:  No rash/erythema/ecchymoses/petechiae/wounds/abscess/warm/dry  NEURO:  Alert, oriented,     LABS:                        11.7   8.3   )-----------( 140      ( 23 Jun 2017 17:45 )             34.8     06-23    137  |  102  |  12  ----------------------------<  137<H>  3.8   |  24  |  0.62    Ca    8.2<L>      23 Jun 2017 14:16  Phos  3.4     06-23  Mg     1.9     06-23    TPro  5.8<L>  /  Alb  3.2<L>  /  TBili  0.7  /  DBili  x   /  AST  13  /  ALT  11  /  AlkPhos  52  06-23    LIVER FUNCTIONS - ( 23 Jun 2017 14:16 )  Alb: 3.2 g/dL / Pro: 5.8 g/dL / ALK PHOS: 52 U/L / ALT: 11 U/L RC / AST: 13 U/L / GGT: x           PT/INR - ( 23 Jun 2017 14:16 )   PT: 20.0 sec;   INR: 1.83 ratio         PTT - ( 23 Jun 2017 14:16 )  PTT:30.3 sec    Imaging:    Patient name: SANCHO DIAZ  YOB: 1950   Age: 67 (M)   MR#: [id number]  Study Date: 6/23/2017  Location: [id number]: Anton Slade RDCS  Study quality: Technically fair  Referring Physician: Saranya Urban MD  Blood Pressure: 87/59 mmHg  Height: 191 cm  Weight: 91 kg  BSA: 2.2 m2  ------------------------------------------------------------------------  PROCEDURE: Transthoracic echocardiogram with 2-D, M-Mode  and complete spectral and color flow Doppler.  INDICATION: Pericardial effusion (noninflammatory) (I31.3)  ------------------------------------------------------------------------  Observations:  Mitral Valve: Normal mitral valve.  Aortic Valve/Aorta: Transcatheter aortic valve replacement.  Peak transaortic valve gradient equals 17 mm Hg, mean  transaortic valve gradient equals 10 mm Hg, which is  probably normal in the presence of a transcatheter aortic  valve replacement.  Normal aortic root.  Left Atrium: Mildly dilated left atrium.  LA volume index =  36 cc/m2.  Left Ventricle:  Overall low normal  left ventricular  systolic function.Paradoxical septal motion noted.  Right Heart: The right ventricle is not well visualized;  grossly probabaly preserved  right ventricular systolic  function. Normal tricuspid valve.  Pericardium/Pleura: No pericardial effusion.  ------------------------------------------------------------------------  Conclusions:  1. Transcatheter aortic valve replacement. Peak transaortic  valve gradient equals 17 mm Hg, mean transaortic valve  gradient equals 10 mm Hg, which is probably normal in the  presence of a transcatheter aortic valve replacement.  2.  Overall low normal  left ventricular systolic  function.Paradoxical septal motion noted.  3. The right ventricle is not well visualized; grossly  probabaly preserved  right ventricular systolic function.  4. No pericardial effusion.  ------------------------------------------------------------------------  Confirmed on  6/23/2017 - 17:02:39 by Chantel Cano M.D.      A/P:   67M w/ PMHx of CAD (s/p bioAVR, CABG x2 vessels 2011), T2DM (HbA1c of 7.1 from 3/2017, on oral hypoglycemic agents), MM (Dx 2012, in remission, on Pomalyst), provoked bilateral PE(Dx 1/2017), Colon Ca (s/p resection), CVA, COPD(not on home O2), recent TAVR (Core Valve 3/2017) who p/w syncope, found to have new LBBB, s/p PPM course c/b hypotension requiring pressor support.     Neuro- at baseline  Pulmonary- satting well on RA, will monitorm. C/w home spiriva  Cardiac- unclear etiology for hypotension- bedside echocardiogram during RRT did not reveal pericardial effusion. Tamponade appears less likely. Pt afebrile, without leukocytosis thus new SIRS unlikely. May be iatrogenic from agents used during EP procedure, or due to allergic reaction from medication.   - will give famotidine, solumedrol, benadryl  - c/w phenylephrine and downtitrate as tolerated  - will check CT A/P to r/o abdominal bleed  - lisinopril d/pratibha in setting of hypotension  CAD  - c/w lipitor, aspirin    GI- if CT A/P negative, c/w DASH diet    Endo   - c/w vitaminD  - c/w insulin SS and fingerstick BS montioring    Neuro  - c/w home lyrica      GI-

## 2017-06-23 NOTE — PROGRESS NOTE ADULT - ASSESSMENT
67M w/ PMHx of CAD (s/p bioAVR, 2vsl CABG 2011), DM2(HbA1c:7.1 from 3/2017, on orals), MM (Dx 2012, in remission, on Pomalyst), Provoked b/l PE(Dx 1/2017), Colon Ca (s/p resection), CVA, COPD(not on home O2), recent TAVR (Core Valve 3/2017), h/o vertebral fracture on dilaudid prn who p/w syncope.

## 2017-06-24 LAB
ANION GAP SERPL CALC-SCNC: 10 MMOL/L — SIGNIFICANT CHANGE UP (ref 5–17)
BUN SERPL-MCNC: 13 MG/DL — SIGNIFICANT CHANGE UP (ref 7–23)
CALCIUM SERPL-MCNC: 8.9 MG/DL — SIGNIFICANT CHANGE UP (ref 8.4–10.5)
CHLORIDE SERPL-SCNC: 104 MMOL/L — SIGNIFICANT CHANGE UP (ref 96–108)
CO2 SERPL-SCNC: 25 MMOL/L — SIGNIFICANT CHANGE UP (ref 22–31)
CREAT SERPL-MCNC: 0.64 MG/DL — SIGNIFICANT CHANGE UP (ref 0.5–1.3)
CULTURE RESULTS: NO GROWTH — SIGNIFICANT CHANGE UP
GLUCOSE SERPL-MCNC: 244 MG/DL — HIGH (ref 70–99)
HCT VFR BLD CALC: 34.8 % — LOW (ref 39–50)
HGB BLD-MCNC: 11.2 G/DL — LOW (ref 13–17)
MAGNESIUM SERPL-MCNC: 2 MG/DL — SIGNIFICANT CHANGE UP (ref 1.6–2.6)
MCHC RBC-ENTMCNC: 29.9 PG — SIGNIFICANT CHANGE UP (ref 27–34)
MCHC RBC-ENTMCNC: 32.2 GM/DL — SIGNIFICANT CHANGE UP (ref 32–36)
MCV RBC AUTO: 92.8 FL — SIGNIFICANT CHANGE UP (ref 80–100)
PHOSPHATE SERPL-MCNC: 3.1 MG/DL — SIGNIFICANT CHANGE UP (ref 2.5–4.5)
PLATELET # BLD AUTO: 116 K/UL — LOW (ref 150–400)
POTASSIUM SERPL-MCNC: 4.6 MMOL/L — SIGNIFICANT CHANGE UP (ref 3.5–5.3)
POTASSIUM SERPL-SCNC: 4.6 MMOL/L — SIGNIFICANT CHANGE UP (ref 3.5–5.3)
RBC # BLD: 3.75 M/UL — LOW (ref 4.2–5.8)
RBC # FLD: 13.9 % — SIGNIFICANT CHANGE UP (ref 10.3–14.5)
SODIUM SERPL-SCNC: 139 MMOL/L — SIGNIFICANT CHANGE UP (ref 135–145)
SPECIMEN SOURCE: SIGNIFICANT CHANGE UP
WBC # BLD: 6 K/UL — SIGNIFICANT CHANGE UP (ref 3.8–10.5)
WBC # FLD AUTO: 6 K/UL — SIGNIFICANT CHANGE UP (ref 3.8–10.5)

## 2017-06-24 PROCEDURE — 93321 DOPPLER ECHO F-UP/LMTD STD: CPT | Mod: 26

## 2017-06-24 PROCEDURE — 93308 TTE F-UP OR LMTD: CPT | Mod: 26

## 2017-06-24 RX ORDER — DIPHENHYDRAMINE HCL 50 MG
25 CAPSULE ORAL ONCE
Qty: 0 | Refills: 0 | Status: COMPLETED | OUTPATIENT
Start: 2017-06-24 | End: 2017-06-24

## 2017-06-24 RX ORDER — WARFARIN SODIUM 2.5 MG/1
6 TABLET ORAL ONCE
Qty: 0 | Refills: 0 | Status: DISCONTINUED | OUTPATIENT
Start: 2017-06-24 | End: 2017-06-24

## 2017-06-24 RX ORDER — WARFARIN SODIUM 2.5 MG/1
6 TABLET ORAL ONCE
Qty: 0 | Refills: 0 | Status: COMPLETED | OUTPATIENT
Start: 2017-06-24 | End: 2017-06-24

## 2017-06-24 RX ORDER — HYDROCORTISONE 1 %
1 OINTMENT (GRAM) TOPICAL DAILY
Qty: 0 | Refills: 0 | Status: DISCONTINUED | OUTPATIENT
Start: 2017-06-24 | End: 2017-06-25

## 2017-06-24 RX ORDER — LORATADINE 10 MG/1
10 TABLET ORAL DAILY
Qty: 0 | Refills: 0 | Status: DISCONTINUED | OUTPATIENT
Start: 2017-06-24 | End: 2017-06-25

## 2017-06-24 RX ADMIN — Medication 1: at 12:51

## 2017-06-24 RX ADMIN — Medication 81 MILLIGRAM(S): at 12:52

## 2017-06-24 RX ADMIN — Medication 25 MILLIGRAM(S): at 21:27

## 2017-06-24 RX ADMIN — CELECOXIB 200 MILLIGRAM(S): 200 CAPSULE ORAL at 13:20

## 2017-06-24 RX ADMIN — Medication 2: at 09:15

## 2017-06-24 RX ADMIN — Medication 200 MILLIGRAM(S): at 05:36

## 2017-06-24 RX ADMIN — CELECOXIB 200 MILLIGRAM(S): 200 CAPSULE ORAL at 12:52

## 2017-06-24 RX ADMIN — HEPARIN SODIUM 5000 UNIT(S): 5000 INJECTION INTRAVENOUS; SUBCUTANEOUS at 21:26

## 2017-06-24 RX ADMIN — WARFARIN SODIUM 6 MILLIGRAM(S): 2.5 TABLET ORAL at 21:26

## 2017-06-24 RX ADMIN — ATORVASTATIN CALCIUM 40 MILLIGRAM(S): 80 TABLET, FILM COATED ORAL at 21:26

## 2017-06-24 RX ADMIN — LORATADINE 10 MILLIGRAM(S): 10 TABLET ORAL at 15:07

## 2017-06-24 RX ADMIN — Medication 200 MILLIGRAM(S): at 17:37

## 2017-06-24 RX ADMIN — BUDESONIDE AND FORMOTEROL FUMARATE DIHYDRATE 2 PUFF(S): 160; 4.5 AEROSOL RESPIRATORY (INHALATION) at 20:50

## 2017-06-24 RX ADMIN — Medication 5000 UNIT(S): at 12:55

## 2017-06-24 RX ADMIN — HEPARIN SODIUM 5000 UNIT(S): 5000 INJECTION INTRAVENOUS; SUBCUTANEOUS at 06:00

## 2017-06-24 RX ADMIN — HEPARIN SODIUM 5000 UNIT(S): 5000 INJECTION INTRAVENOUS; SUBCUTANEOUS at 12:55

## 2017-06-24 RX ADMIN — TIOTROPIUM BROMIDE 1 CAPSULE(S): 18 CAPSULE ORAL; RESPIRATORY (INHALATION) at 12:41

## 2017-06-24 NOTE — CHART NOTE - NSCHARTNOTEFT_GEN_A_CORE
====================  NEW EVENTS:  ====================  Fem line removed    ====================  SUMMARY:  ====================  67M w/ PMHx of CAD (s/p bioAVR, CABG x2 vessels 2011), T2DM (HbA1c of 7.1 from 3/2017, on oral hypoglycemic agents), MM (Dx 2012, in remission, on Pomalyst), provoked bilateral PE(Dx 1/2017), Colon Ca (s/p resection), CVA, COPD(not on home O2), recent TAVR (Core Valve 3/2017) who p/w syncope, found to have new LBBB, s/p PPM course c/b hypotension requiring pressor support, now resolved.    ====================  VITALS:  ====================    ICU Vital Signs Last 24 Hrs  T(C): 36.6, Max: 36.6 (06-24 @ 08:00)  T(F): 97.8, Max: 97.9 (06-24 @ 08:00)  HR: 60 (58 - 88)  BP: 121/67 (95/52 - 141/62)  BP(mean): 103 (62 - 103)  ABP: --  ABP(mean): --  RR: 15 (9 - 30)  SpO2: 97% (94% - 100%)      I&O's Summary  I & Os for 24h ending 24 Jun 2017 07:00  =============================================  IN: 1001 ml / OUT: 2600 ml / NET: -1599 ml    I & Os for current day (as of 24 Jun 2017 22:53)  =============================================  IN: 360 ml / OUT: 300 ml / NET: 60 ml      Adult Advanced Hemodynamics Last 24 Hrs  CVP(mm Hg): --  CVP(cm H2O): --  CO: --  CI: --  PA: --  PA(mean): --  PCWP: --  SVR: --  SVRI: --  PVR: --  PVRI: --        ====================  LABS:  ====================                          11.2   6.0   )-----------( 116      ( 24 Jun 2017 04:50 )             34.8     06-24    139  |  104  |  13  ----------------------------<  244<H>  4.6   |  25  |  0.64    Ca    8.9      24 Jun 2017 04:50  Phos  3.1     06-24  Mg     2.0     06-24    TPro  5.8<L>  /  Alb  3.2<L>  /  TBili  0.7  /  DBili  x   /  AST  13  /  ALT  11  /  AlkPhos  52  06-23    PT/INR - ( 23 Jun 2017 14:16 )   PT: 20.0 sec;   INR: 1.83 ratio         PTT - ( 23 Jun 2017 14:16 )  PTT:30.3 sec    ABG - ( 23 Jun 2017 14:15 )  pH: 7.43  /  pCO2: 39    /  pO2: 58    / HCO3: 25    / Base Excess: 1.1   /  SaO2: 89                  ====================  PLAN:  ====================  1) hypotension, now resloved. Unclear etiology. Contrast allergy vs. vasovagal  -ACEi d/c'ed in the setting of hypotension, consider restarting in AM

## 2017-06-24 NOTE — DIETITIAN INITIAL EVALUATION ADULT. - NS AS NUTRI INTERV ED CONTENT
Recommended modifications/Nutrition relationship to health/disease/Priority modifications/Purpose of the nutrition education/1) Coumadin education provided including: Coumadin/Vitamin K interaction, vitamin k points and serving sizes, and consistent vitamin K intake. 2) Reviewed T2DM Nutrition Therapy Handout. Discussed balanced meal pattern, monitoring portion sizes, carbohydrate counting, including protein at each meal and snack, and limiting concentrated sweets. Reinforced importance of self-monitoring blood glucose levels. 3) Reviewed importance of general heart healthy diet including foods recommended and foods to avoid. Discussed eating foods low in sodium (<140mg per serving) and importance of limiting cholesterol to <200mg/day

## 2017-06-24 NOTE — PROGRESS NOTE ADULT - SUBJECTIVE AND OBJECTIVE BOX
INTERVAL HPI/OVERNIGHT EVENTS:    SUBJECTIVE: Patient seen and examined at bedside. Overnight d/c phenylephrine, patient was not hypotension. No CP, SOB, fever, Chills, nausea, vomiting, diarrhea. States he feels much better    OBJECTIVE:    VITAL SIGNS:  ICU Vital Signs Last 24 Hrs  T(C): 36.5, Max: 37.4 (06-23 @ 19:00)  T(F): 97.7, Max: 99.4 (06-23 @ 19:00)  HR: 58 (58 - 98)  BP: 121/60 (69/41 - 133/64)  BP(mean): 83 (61 - 101)  ABP: --  ABP(mean): --  RR: 11 (10 - 28)  SpO2: 96% (91% - 100%)        I & Os for current day (as of 06-24 @ 07:53)  =============================================  IN: 1001 ml / OUT: 2600 ml / NET: -1599 ml    CAPILLARY BLOOD GLUCOSE  232 (23 Jun 2017 22:00)      PHYSICAL EXAM:    General: WDWN ; NAD  HEENT: NC/AT; PERRL, anicteric sclera  Neck: supple, no JVD  Respiratory: Rhonchi in bases. CTAB in upper airway  Cardiovascular: +S1/S2; RRR; no M/R/G  Gastrointestinal: soft, NT/ND; +BS x4  Extremities: WWP; 2+ peripheral pulses B/L; no LE edema. R groin site soft, non-tender. L femoral site dry, dressing intact. No eccyhmosis  Skin: normal color and turgor; no rash  Neurological: AOx3, no focal deficits     MEDICATIONS:  MEDICATIONS  (STANDING):  heparin  Injectable 5000Unit(s) SubCutaneous every 8 hours  insulin lispro (HumaLOG) corrective regimen sliding scale  SubCutaneous three times a day before meals  insulin lispro (HumaLOG) corrective regimen sliding scale  SubCutaneous at bedtime  dextrose 50% Injectable 12.5Gram(s) IV Push once  dextrose 50% Injectable 25Gram(s) IV Push once  aspirin enteric coated 81milliGRAM(s) Oral daily  celecoxib 200milliGRAM(s) Oral daily  pregabalin 200milliGRAM(s) Oral two times a day  atorvastatin 40milliGRAM(s) Oral at bedtime  cholecalciferol 5000Unit(s) Oral daily  buDESOnide  80 MICROgram(s)/formoterol 4.5 MICROgram(s) Inhaler 2Puff(s) Inhalation two times a day  tiotropium 18 MICROgram(s) Capsule 1Capsule(s) Inhalation daily    MEDICATIONS  (PRN):  acetaminophen   Tablet. 650milliGRAM(s) Oral every 6 hours PRN Mild Pain (1 - 3)  dextrose Gel 1Dose(s) Oral once PRN Blood Glucose LESS THAN 70 milliGRAM(s)/deciliter  glucagon  Injectable 1milliGRAM(s) IntraMuscular once PRN Glucose LESS THAN 70 milligrams/deciliter  HYDROmorphone   Tablet 2milliGRAM(s) Oral every 4 hours PRN Moderate Pain (4 - 6)      ALLERGIES:  Allergies    penicillin (Rash)    Intolerances        LABS:                        11.2   6.0   )-----------( 116      ( 24 Jun 2017 04:50 )             34.8     06-24    139  |  104  |  13  ----------------------------<  244<H>  4.6   |  25  |  0.64    Ca    8.9      24 Jun 2017 04:50  Phos  3.1     06-24  Mg     2.0     06-24    TPro  5.8<L>  /  Alb  3.2<L>  /  TBili  0.7  /  DBili  x   /  AST  13  /  ALT  11  /  AlkPhos  52  06-23    LIVER FUNCTIONS - ( 23 Jun 2017 14:16 )  Alb: 3.2 g/dL / Pro: 5.8 g/dL / ALK PHOS: 52 U/L / ALT: 11 U/L RC / AST: 13 U/L / GGT: x           PT/INR - ( 23 Jun 2017 14:16 )   PT: 20.0 sec;   INR: 1.83 ratio         PTT - ( 23 Jun 2017 14:16 )  PTT:30.3 sec    CARDIAC MARKERS ( 22 Jun 2017 15:04 )  x     / <0.01 ng/mL / 57 U/L / x     / 2.5 ng/mL        RADIOLOGY & ADDITIONAL TESTS: Reviewed.    67M w/ PMHx of CAD (s/p bioAVR, CABG x2 vessels 2011), T2DM (HbA1c of 7.1 from 3/2017, on oral hypoglycemic agents), MM (Dx 2012, in remission, on Pomalyst), provoked bilateral PE(Dx 1/2017), Colon Ca (s/p resection), CVA, COPD(not on home O2), recent TAVR (Core Valve 3/2017) who p/w syncope, found to have new LBBB, s/p PPM course c/b hypotension requiring pressor support.     Neuro- at baseline  Pulmonary- satting well on RA, will monitor. C/w home spiriva  Cardiac- unclear etiology for hypotension but now resolved  -bedside echocardiogram during RRT did not reveal pericardial effusion. Tamponade appears less likely. Pt afebrile, without leukocytosis thus new SIRS unlikely. May be iatrogenic from agents used during EP procedure, or due to allergic reaction from medication ( Versed/fentanyl)   - s/p famotidine, solumedrol, benadryl  - D/c phenylephrine, will likely pull femoral line  -  CT A/P  negative for abdominal bleed. H/H stable  - lisinopril d/pratibha in setting of hypotension  CAD  - c/w lipitor, aspirin    GI- if CT A/P negative, c/w DASH diet    Endo   - c/w vitaminD  - c/w insulin SS and fingerstick BS montioring  Neuro  - c/w home lyrica    GI- No issues  DVT ppx- HSQ

## 2017-06-24 NOTE — PROGRESS NOTE ADULT - SUBJECTIVE AND OBJECTIVE BOX
24H hour events: overnight events noted    MEDICATIONS:  heparin  Injectable 5000Unit(s) SubCutaneous every 8 hours  aspirin enteric coated 81milliGRAM(s) Oral daily      buDESOnide  80 MICROgram(s)/formoterol 4.5 MICROgram(s) Inhaler 2Puff(s) Inhalation two times a day  tiotropium 18 MICROgram(s) Capsule 1Capsule(s) Inhalation daily    acetaminophen   Tablet. 650milliGRAM(s) Oral every 6 hours PRN  celecoxib 200milliGRAM(s) Oral daily  HYDROmorphone   Tablet 2milliGRAM(s) Oral every 4 hours PRN  pregabalin 200milliGRAM(s) Oral two times a day      insulin lispro (HumaLOG) corrective regimen sliding scale  SubCutaneous three times a day before meals  insulin lispro (HumaLOG) corrective regimen sliding scale  SubCutaneous at bedtime  dextrose Gel 1Dose(s) Oral once PRN  dextrose 50% Injectable 12.5Gram(s) IV Push once  dextrose 50% Injectable 25Gram(s) IV Push once  glucagon  Injectable 1milliGRAM(s) IntraMuscular once PRN  atorvastatin 40milliGRAM(s) Oral at bedtime    cholecalciferol 5000Unit(s) Oral daily      REVIEW OF SYSTEMS:  Complete 10point ROS negative except as documented above.    PHYSICAL EXAM:  T(C): 36.5, Max: 37.4 (06-23 @ 19:00)  HR: 58 (58 - 98)  BP: 113/62 (69/41 - 133/64)  RR: 9 (9 - 28)  SpO2: 97% (91% - 100%)  Wt(kg): --  I&O's Summary    I & Os for current day (as of 24 Jun 2017 11:04)  =============================================  IN: 1001 ml / OUT: 2600 ml / NET: -1599 ml      Appearance: Normal	  HEENT:   Normal oral mucosa, PERRL, EOMI	  Lymphatic: No lymphadenopathy  Cardiovascular: Normal S1 S2, No JVD, No murmurs, No edema  Respiratory: Lungs clear to auscultation	  Psychiatry: A & O x 3, Mood & affect appropriate  Gastrointestinal:  Soft, Non-tender, + BS	  Skin: No rashes, No ecchymoses, No cyanosis	  Neurologic: Non-focal  Extremities: Normal range of motion, No clubbing, cyanosis or edema  Vascular: Peripheral pulses palpable 2+ bilaterally        LABS:	 	    CBC Full  -  ( 24 Jun 2017 04:50 )  WBC Count : 6.0 K/uL  Hemoglobin : 11.2 g/dL  Hematocrit : 34.8 %  Platelet Count - Automated : 116 K/uL  Mean Cell Volume : 92.8 fl  Mean Cell Hemoglobin : 29.9 pg  Mean Cell Hemoglobin Concentration : 32.2 gm/dL  Auto Neutrophil # : x  Auto Lymphocyte # : x  Auto Monocyte # : x  Auto Eosinophil # : x  Auto Basophil # : x  Auto Neutrophil % : x  Auto Lymphocyte % : x  Auto Monocyte % : x  Auto Eosinophil % : x  Auto Basophil % : x    06-24    139  |  104  |  13  ----------------------------<  244<H>  4.6   |  25  |  0.64  06-23    137  |  102  |  12  ----------------------------<  137<H>  3.8   |  24  |  0.62    Ca    8.9      24 Jun 2017 04:50  Ca    8.2<L>      23 Jun 2017 14:16  Phos  3.1     06-24  Phos  3.4     06-23  Mg     2.0     06-24  Mg     1.9     06-23    TPro  5.8<L>  /  Alb  3.2<L>  /  TBili  0.7  /  DBili  x   /  AST  13  /  ALT  11  /  AlkPhos  52  06-23  TPro  6.8  /  Alb  3.7  /  TBili  0.6  /  DBili  x   /  AST  16  /  ALT  13  /  AlkPhos  60  06-22      proBNP: Serum Pro-Brain Natriuretic Peptide: 384 pg/mL (06-22 @ 15:04)      	  ASSESSMENT/PLAN: 	    67M w/ PMHx of CAD (s/p bioAVR, CABG x2 vessels 2011), T2DM (HbA1c of 7.1 from 3/2017, on oral hypoglycemic agents), MM (Dx 2012, in remission, on Pomalyst), provoked bilateral PE(Dx 1/2017), Colon Ca (s/p resection), CVA, COPD(not on home O2), recent TAVR (Core Valve 3/2017) who p/w syncope, found to have new LBBB, s/p PPM     - incision site C/D/I  - no events on telemetry  - care as per CCU team

## 2017-06-24 NOTE — DIETITIAN INITIAL EVALUATION ADULT. - NS AS NUTRI INTERV MEALS SNACK
1) Continue current diet as ordered. 2) Provide food preferences within diet restrictions as feasible. 3) Encourage po intake w/ snacks ordered at meals./Carbohydrate - modified diet/General/healthful diet

## 2017-06-24 NOTE — DIETITIAN INITIAL EVALUATION ADULT. - ADHERENCE
fair/follows a consistent CHO and consistent vitamin K diet. Tries to limit salt but is not as successful with sodium restriction. Confirms NKFA.  Took a vitamin D3 vitamin PTA. Also on glimepiride, Janumet, coumadin.

## 2017-06-24 NOTE — DIETITIAN INITIAL EVALUATION ADULT. - SOURCE
wife, comprehensive chart review, previous RD note (March 2017)/other (specify)/family/significant other/patient

## 2017-06-24 NOTE — DIETITIAN INITIAL EVALUATION ADULT. - ENERGY NEEDS
ht: 75 inches, wt: 215.1 pounds, BMI: 26.9 kg/m2, IBW: 196 pounds (+/- 10%), 110 %IBW  Edema: 1+ sonia foot. Skin: no breakdown, surgical incision noted.  Other pertinent information: 66 y/o male presented s/p syncope. History of T2DM, CABG, TAVR, COPD. RRT called yesterday 1 hr after PPM placement and transferred to CCU for further management/monitoring.

## 2017-06-25 VITALS
SYSTOLIC BLOOD PRESSURE: 147 MMHG | HEART RATE: 58 BPM | DIASTOLIC BLOOD PRESSURE: 75 MMHG | RESPIRATION RATE: 16 BRPM | OXYGEN SATURATION: 95 %

## 2017-06-25 LAB
ANION GAP SERPL CALC-SCNC: 8 MMOL/L — SIGNIFICANT CHANGE UP (ref 5–17)
BUN SERPL-MCNC: 19 MG/DL — SIGNIFICANT CHANGE UP (ref 7–23)
CALCIUM SERPL-MCNC: 9 MG/DL — SIGNIFICANT CHANGE UP (ref 8.4–10.5)
CHLORIDE SERPL-SCNC: 105 MMOL/L — SIGNIFICANT CHANGE UP (ref 96–108)
CO2 SERPL-SCNC: 28 MMOL/L — SIGNIFICANT CHANGE UP (ref 22–31)
CREAT SERPL-MCNC: 0.56 MG/DL — SIGNIFICANT CHANGE UP (ref 0.5–1.3)
GLUCOSE SERPL-MCNC: 112 MG/DL — HIGH (ref 70–99)
HCT VFR BLD CALC: 33.3 % — LOW (ref 39–50)
HGB BLD-MCNC: 11.1 G/DL — LOW (ref 13–17)
INR BLD: 1.51 RATIO — HIGH (ref 0.88–1.16)
MAGNESIUM SERPL-MCNC: 2.2 MG/DL — SIGNIFICANT CHANGE UP (ref 1.6–2.6)
MCHC RBC-ENTMCNC: 31.1 PG — SIGNIFICANT CHANGE UP (ref 27–34)
MCHC RBC-ENTMCNC: 33.3 GM/DL — SIGNIFICANT CHANGE UP (ref 32–36)
MCV RBC AUTO: 93.2 FL — SIGNIFICANT CHANGE UP (ref 80–100)
PHOSPHATE SERPL-MCNC: 2.6 MG/DL — SIGNIFICANT CHANGE UP (ref 2.5–4.5)
PLATELET # BLD AUTO: 103 K/UL — LOW (ref 150–400)
POTASSIUM SERPL-MCNC: 4.3 MMOL/L — SIGNIFICANT CHANGE UP (ref 3.5–5.3)
POTASSIUM SERPL-SCNC: 4.3 MMOL/L — SIGNIFICANT CHANGE UP (ref 3.5–5.3)
PROTHROM AB SERPL-ACNC: 16.6 SEC — HIGH (ref 9.8–12.7)
RBC # BLD: 3.58 M/UL — LOW (ref 4.2–5.8)
RBC # FLD: 13.9 % — SIGNIFICANT CHANGE UP (ref 10.3–14.5)
SODIUM SERPL-SCNC: 141 MMOL/L — SIGNIFICANT CHANGE UP (ref 135–145)
WBC # BLD: 5.7 K/UL — SIGNIFICANT CHANGE UP (ref 3.8–10.5)
WBC # FLD AUTO: 5.7 K/UL — SIGNIFICANT CHANGE UP (ref 3.8–10.5)

## 2017-06-25 PROCEDURE — 87040 BLOOD CULTURE FOR BACTERIA: CPT

## 2017-06-25 PROCEDURE — 93005 ELECTROCARDIOGRAM TRACING: CPT

## 2017-06-25 PROCEDURE — C1769: CPT

## 2017-06-25 PROCEDURE — 99152 MOD SED SAME PHYS/QHP 5/>YRS: CPT

## 2017-06-25 PROCEDURE — 80048 BASIC METABOLIC PNL TOTAL CA: CPT

## 2017-06-25 PROCEDURE — 94640 AIRWAY INHALATION TREATMENT: CPT

## 2017-06-25 PROCEDURE — 80053 COMPREHEN METABOLIC PANEL: CPT

## 2017-06-25 PROCEDURE — 83880 ASSAY OF NATRIURETIC PEPTIDE: CPT

## 2017-06-25 PROCEDURE — 71045 X-RAY EXAM CHEST 1 VIEW: CPT

## 2017-06-25 PROCEDURE — 85610 PROTHROMBIN TIME: CPT

## 2017-06-25 PROCEDURE — 99285 EMERGENCY DEPT VISIT HI MDM: CPT | Mod: 25

## 2017-06-25 PROCEDURE — 85027 COMPLETE CBC AUTOMATED: CPT

## 2017-06-25 PROCEDURE — 93321 DOPPLER ECHO F-UP/LMTD STD: CPT

## 2017-06-25 PROCEDURE — C1898: CPT

## 2017-06-25 PROCEDURE — 85730 THROMBOPLASTIN TIME PARTIAL: CPT

## 2017-06-25 PROCEDURE — 84484 ASSAY OF TROPONIN QUANT: CPT

## 2017-06-25 PROCEDURE — C1730: CPT

## 2017-06-25 PROCEDURE — C1894: CPT

## 2017-06-25 PROCEDURE — 83735 ASSAY OF MAGNESIUM: CPT

## 2017-06-25 PROCEDURE — 82553 CREATINE MB FRACTION: CPT

## 2017-06-25 PROCEDURE — 87086 URINE CULTURE/COLONY COUNT: CPT

## 2017-06-25 PROCEDURE — C1892: CPT

## 2017-06-25 PROCEDURE — C1785: CPT

## 2017-06-25 PROCEDURE — 93620 COMP EP EVL R AT VEN PAC&REC: CPT

## 2017-06-25 PROCEDURE — 74176 CT ABD & PELVIS W/O CONTRAST: CPT

## 2017-06-25 PROCEDURE — 82803 BLOOD GASES ANY COMBINATION: CPT

## 2017-06-25 PROCEDURE — 86901 BLOOD TYPING SEROLOGIC RH(D): CPT

## 2017-06-25 PROCEDURE — 93306 TTE W/DOPPLER COMPLETE: CPT

## 2017-06-25 PROCEDURE — 84100 ASSAY OF PHOSPHORUS: CPT

## 2017-06-25 PROCEDURE — 93010 ELECTROCARDIOGRAM REPORT: CPT

## 2017-06-25 PROCEDURE — 86850 RBC ANTIBODY SCREEN: CPT

## 2017-06-25 PROCEDURE — 82550 ASSAY OF CK (CPK): CPT

## 2017-06-25 PROCEDURE — 33208 INSRT HEART PM ATRIAL & VENT: CPT | Mod: 59,KX

## 2017-06-25 PROCEDURE — 86900 BLOOD TYPING SEROLOGIC ABO: CPT

## 2017-06-25 PROCEDURE — 93308 TTE F-UP OR LMTD: CPT

## 2017-06-25 PROCEDURE — 99153 MOD SED SAME PHYS/QHP EA: CPT

## 2017-06-25 RX ORDER — RAMIPRIL 5 MG
1 CAPSULE ORAL
Qty: 0 | Refills: 0 | COMMUNITY

## 2017-06-25 RX ADMIN — Medication 5000 UNIT(S): at 12:37

## 2017-06-25 RX ADMIN — CELECOXIB 200 MILLIGRAM(S): 200 CAPSULE ORAL at 12:37

## 2017-06-25 RX ADMIN — LORATADINE 10 MILLIGRAM(S): 10 TABLET ORAL at 12:37

## 2017-06-25 RX ADMIN — TIOTROPIUM BROMIDE 1 CAPSULE(S): 18 CAPSULE ORAL; RESPIRATORY (INHALATION) at 11:33

## 2017-06-25 RX ADMIN — HEPARIN SODIUM 5000 UNIT(S): 5000 INJECTION INTRAVENOUS; SUBCUTANEOUS at 06:06

## 2017-06-25 RX ADMIN — Medication 1 APPLICATION(S): at 06:08

## 2017-06-25 RX ADMIN — Medication 200 MILLIGRAM(S): at 06:06

## 2017-06-25 RX ADMIN — Medication 81 MILLIGRAM(S): at 12:37

## 2017-06-25 RX ADMIN — Medication 1: at 12:43

## 2017-06-25 RX ADMIN — BUDESONIDE AND FORMOTEROL FUMARATE DIHYDRATE 2 PUFF(S): 160; 4.5 AEROSOL RESPIRATORY (INHALATION) at 05:50

## 2017-06-25 NOTE — DISCHARGE NOTE ADULT - HOSPITAL COURSE
67M w/ PMHx of CAD (s/p bioAVR, CABG x2 vessels 2011), T2DM (HbA1c of 7.1 from 3/2017, on oral hypoglycemic agents), MM (Dx 2012, in remission, on Pomalyst), provoked bilateral PE(Dx 1/2017), Colon Ca (s/p resection), CVA, COPD(not on home O2), recent TAVR (Core Valve 3/2017) who p/w syncope.  The patient syncopized twice in April - the first episode occurred after he bent down to tie his shoes. He was standing up when he felt lightheaded and lost consciousness. His wife moved him to a chair where he lost consciousness again - this time without moving. His BP was taken twice by EMS - 90 systolic and 230 systolic per wife. He was started on an ACEI for HTN at the time and had no further syncopal episodes.  He has been off AVN blocking agents since his TAVR. 5d PTA the patient again syncopized after picking up a tool from the bottom drawer of his toolbox - no head trauma, unwitnessed, +LOC - hit R elbow and shoulder. He denies palpitations, CP. He was not twisting his head when any of the episodes occurred. No recent medication or dietary changes. He stated he felt lightheaded at Dr. Goldsmith's Office.     ED Triage Vitals: T:97.8, HR:65, BP:113/66, RR:18, SpO2:98% on RA  Pt's orthostatics were negative.   Labs notable for anemia with H/H of 11.7/34.6, and thrombocytopenia with platelets of 127, and elevated monocytes (20%). INR elevated at 1.71. Troponin was negative   CXR found clear lungs. EKG found LBBB   EP team consulted. Coumadin was held for EP study.      About 1 hour after EP study, pt spontaneously became hypotensive to SBP 70s, with HR at baseline 60s-70s, and presyncope. RRT was called. Bedside TTE found no effusion. CXR obtained found clear lungs. Pt given 2L IVNS and 0.5mg atropine x2 and transferred to CCU.  At CCU, patient was persistently hypotensive to SBP 70s, thus pt started on phenylephrine and central line in R femoral placed. CT A/P ordered and negative for RP bleed. The phenylephrine was d/pratibha and the femoral line was removed. Patient remained asymptomatic and was continued on coumadin. His INR was subtherapeutic at 1.51 at time of discharge and was given instructions to continue his previous home dose and follow up for INR check in 2 days.

## 2017-06-25 NOTE — PROGRESS NOTE ADULT - ASSESSMENT
66 yo male with PMH of CAD (s/p bioAVR and CABG x2 vessels 2011, recent TAVR core valve 3/2017), T2DM (HbA1c of 7.1 from 3/2017, on oral meds at home), MM (Dx 2012 in remission on Pomalyst), provoked bilateral PE (1/2017), colon ca (s/p resection), CVA, COPD (not on home O2) who p/w syncope, s/p EP procedure with PPM placement 66 yo male with PMH of CAD (s/p bioAVR and CABG x2 vessels 2011, recent TAVR core valve 3/2017), T2DM (HbA1c of 7.1 from 3/2017, on oral meds at home), MM (Dx 2012 in remission on Pomalyst), provoked bilateral PE (1/2017), colon ca (s/p resection), CVA, COPD (not on home O2) who p/w syncope, s/p EP procedure with PPM placement c/b hypotension requiring pressors.    Neuro - at baseline  Pulmonary - satting 98% on RA  Cardiac - unclear etiology for hypotension which has now resolved  - s/p EP procedure with PPM placement c/b hypotension possibly due to allergic reaction from EP medications or iatrogenic  - received famotidine, solumedrol, benadryl post-EP  - Fem line removed 6/23  - lisinopril d/pratibha because of hypotension  - c/w lipitor, aspirin  - c/w coumadin for history of bilateral PE, INR 1.51 today  GI - negative CT A/P for abdominal bleed  - c/w DASH diet  Endo- c/w vitamin D, insulin SS with fingerstick monitoring  Neuro- c/w home dose lyrica 68 yo male with PMH of CAD (s/p bioAVR and CABG x2 vessels 2011, recent TAVR core valve 3/2017), T2DM (HbA1c of 7.1 from 3/2017, on oral meds at home), MM (Dx 2012 in remission on Pomalyst), provoked bilateral PE (1/2017), colon ca (s/p resection), CVA, COPD (not on home O2) who p/w syncope, s/p EP procedure with PPM placement c/b hypotension requiring pressors.    Neuro - at baseline  Pulmonary - saturating 98% on RA  Cardiac - unclear etiology for hypotension which has now resolved  - s/p EP procedure with PPM placement c/b hypotension possibly due to allergic reaction from EP medications or iatrogenic  - received famotidine, solumedrol, benadryl post-EP  - Fem line removed 6/23  - lisinopril d/pratibha because of hypotension  - c/w lipitor, aspirin  - c/w coumadin for history of bilateral PE, INR 1.51 today  GI - negative CT A/P for abdominal bleed  - c/w DASH diet  Endo- c/w vitamin D, insulin SS with fingerstick monitoring  Neuro- c/w home dose lyrica    DVT ppx - HSQ  Dispo- D/C today 66 yo male with PMH of CAD (s/p bioAVR and CABG x2 vessels 2011, recent TAVR core valve 3/2017), T2DM (HbA1c of 7.1 from 3/2017, on oral meds at home), MM (Dx 2012 in remission on Pomalyst), provoked bilateral PE (1/2017), colon ca (s/p resection), CVA, COPD (not on home O2) who p/w syncope, s/p EP procedure with PPM placement c/b hypotension requiring pressors.    Neuro - at baseline  Pulmonary - saturating 98% on RA  Cardiac - unclear etiology for hypotension which has now resolved  - s/p EP procedure with PPM placement c/b hypotension possibly due to allergic reaction from EP medications or iatrogenic  - received famotidine, solumedrol, benadryl post-EP  - Fem line removed 6/23  - lisinopril d/pratibha because of hypotension, will follow up with cardiologist outpt to assess restarting  - c/w lipitor, aspirin  - c/w home dose of coumadin for history of bilateral PE, INR 1.51 today, will follow up with primary to check INR in 2 days  GI - negative CT A/P for abdominal bleed  - c/w DASH diet  Endo- c/w vitamin D, insulin SS with fingerstick monitoring  Neuro- c/w home dose lyrica    DVT ppx - HSQ  Dispo- D/C today

## 2017-06-25 NOTE — DISCHARGE NOTE ADULT - PATIENT PORTAL LINK FT
“You can access the [facility] Patient Portal, offered by [facility], by registering with the following website: [email or web address]”

## 2017-06-25 NOTE — DISCHARGE NOTE ADULT - ADDITIONAL INSTRUCTIONS
Dr Amaya EP /[facility] on July 5 at 2:50 PM  Call for fever, chills. Please follow up with Dr Amaya EP /[facility] on July 5 at 2:50 PM  Call for fever, chills.    Please follow up with your primary care doctor for coumadin monitoring and do not take your ramipril until you see your primary care doctor.

## 2017-06-25 NOTE — DISCHARGE NOTE ADULT - CARE PROVIDER_API CALL
Rajendra Amaya), Cardiac Electrophysiology; Cardiology  [address] [place] [address]  Phone: [phone]  Fax: [phone] Rajendra Amaya), Cardiac Electrophysiology; Cardiology  [address] [place] [address]  Phone: [phone]  Fax: [phone]    Wilson Whitt (), Internal Medicine  [address] [facility] [address]  [place] [address]  Phone: [phone]  Fax: [phone]

## 2017-06-25 NOTE — DISCHARGE NOTE ADULT - SECONDARY DIAGNOSIS.
Multiple myeloma, remission status unspecified Other pulmonary embolism without acute cor pulmonale, unspecified chronicity Type 2 diabetes mellitus without complication, with long-term current use of insulin Essential hypertension Chronic obstructive pulmonary disease, unspecified COPD type Coronary artery disease involving native coronary artery of native heart, angina presence unspecified

## 2017-06-25 NOTE — DISCHARGE NOTE ADULT - MEDICATION SUMMARY - MEDICATIONS TO TAKE
I will START or STAY ON the medications listed below when I get home from the hospital:    aspirin 81 mg oral tablet  -- 1 tab(s) by mouth once a day  -- Indication: For Coronary artery disease involving native coronary artery of native heart, angina presence unspecified    CeleBREX 200 mg oral capsule  -- 1 cap(s) by mouth once a day  -- Indication: For pain    HYDROmorphone 2 mg oral tablet  -- 1 tab(s) by mouth every 4 hours, As Needed  -- Indication: For pain    warfarin 6 mg oral tablet  -- 1 tab(s) by mouth once a day on Monday, Wednesday,Thursday, Saturday and Sunday  -- Indication: For Other pulmonary embolism without acute cor pulmonale, unspecified chronicity    warfarin 6 mg oral tablet  -- 0.5 tab(s) by mouth once a day on [place] and Friday  -- Indication: For Other pulmonary embolism without acute cor pulmonale, unspecified chronicity    Lyrica 200 mg oral capsule  -- 1 cap(s) by mouth 2 times a day  -- Indication: For pain    glimepiride 1 mg oral tablet  -- 1 tab(s) by mouth once a day  -- Indication: For Type 2 diabetes mellitus without complication, with long-term current use of insulin    Janumet  mg-1000 mg oral tablet, extended release  -- 1 tab(s) by mouth once a day (in the evening)  -- Indication: For Type 2 diabetes mellitus without complication, with long-term current use of insulin    atorvastatin 40 mg oral tablet  -- 1 tab(s) by mouth once a day  -- Indication: For Coronary artery disease involving native coronary artery of native heart, angina presence unspecified    Breo Ellipta 100 mcg-25 mcg/inh inhalation powder  -- 1 puff(s) inhaled once a day  -- Indication: For Chronic obstructive pulmonary disease, unspecified COPD type    Incruse Ellipta 62.5 mcg/inh inhalation powder  -- 1 puff(s) inhaled once a day  -- Indication: For Chronic obstructive pulmonary disease, unspecified COPD type    Pomalyst 4 mg oral capsule  -- 1 cap(s) by mouth once a day  -- on for 21 days/off for 7days  -- Indication: For Multiple myeloma, remission status unspecified    Vitamin D3 1000 intl units oral tablet  -- 5 tab(s) by mouth once a day  -- Indication: For Multiple myeloma, remission status unspecified

## 2017-06-25 NOTE — PROGRESS NOTE ADULT - SUBJECTIVE AND OBJECTIVE BOX
PATIENT:  SANCHO DIAZ  [id number]    CHIEF COMPLAINT:  Patient is a 67y old  Male who presents with a chief complaint of "I passed out again" (2017 17:19)      INTERVAL HISTORY:    REVIEW OF SYSTEMS:    CONSTITUTIONAL: No weakness, fevers or chills  EYES/ENT: No visual changes;  No vertigo or throat pain   NECK: No pain or stiffness  RESPIRATORY: No cough, wheezing, hemoptysis; No shortness of breath  CARDIOVASCULAR: No chest pain or palpitations  GASTROINTESTINAL: No abdominal or epigastric pain. No nausea, vomiting, or hematemesis; No diarrhea or constipation. No melena or hematochezia.  GENITOURINARY: No dysuria, frequency or hematuria  NEUROLOGICAL: No numbness or weakness  SKIN: No itching, burning, rashes, or lesions   All other review of systems is negative unless indicated above.    MEDICATIONS  (STANDING):  heparin  Injectable 5000Unit(s) SubCutaneous every 8 hours  insulin lispro (HumaLOG) corrective regimen sliding scale  SubCutaneous three times a day before meals  insulin lispro (HumaLOG) corrective regimen sliding scale  SubCutaneous at bedtime  dextrose 50% Injectable 12.5Gram(s) IV Push once  dextrose 50% Injectable 25Gram(s) IV Push once  aspirin enteric coated 81milliGRAM(s) Oral daily  celecoxib 200milliGRAM(s) Oral daily  pregabalin 200milliGRAM(s) Oral two times a day  atorvastatin 40milliGRAM(s) Oral at bedtime  cholecalciferol 5000Unit(s) Oral daily  buDESOnide  80 MICROgram(s)/formoterol 4.5 MICROgram(s) Inhaler 2Puff(s) Inhalation two times a day  tiotropium 18 MICROgram(s) Capsule 1Capsule(s) Inhalation daily  loratadine 10milliGRAM(s) Oral daily    MEDICATIONS  (PRN):  acetaminophen   Tablet. 650milliGRAM(s) Oral every 6 hours PRN Mild Pain (1 - 3)  dextrose Gel 1Dose(s) Oral once PRN Blood Glucose LESS THAN 70 milliGRAM(s)/deciliter  glucagon  Injectable 1milliGRAM(s) IntraMuscular once PRN Glucose LESS THAN 70 milligrams/deciliter  HYDROmorphone   Tablet 2milliGRAM(s) Oral every 4 hours PRN Moderate Pain (4 - 6)  hydrocortisone 1% Ointment 1Application(s) Topical daily PRN Rash and/or Itching      OBJECTIVE:  ICU Vital Signs Last 24 Hrs  T(C): 36.5, Max: 36.7 ( @ 23:00)  T(F): 97.7, Max: 98 ( @ 23:00)  HR: 60 (58 - 88)  BP: 126/65 (107/71 - 148/72)  BP(mean): 90 (79 - 103)  ABP: --  ABP(mean): --  RR: 15 (9 - 30)  SpO2: 96% (94% - 98%)      Adult Advanced Hemodynamics Last 24 Hrs  CVP(mm Hg): --  CVP(cm H2O): --  CO: --  CI: --  PA: --  PA(mean): --  PCWP: --  SVR: --  SVRI: --  PVR: --  PVRI: --  CAPILLARY BLOOD GLUCOSE  194 (2017 21:20)  109 (2017 17:19)  198 (2017 12:00)    CAPILLARY BLOOD GLUCOSE  194 (2017 21:20)      I&O's Summary    I & Os for current day (as of 2017 07:33)  =============================================  IN: 480 ml / OUT: 1450 ml / NET: -970 ml    Daily     Daily Weight in k.1 (2017 06:00)    General: WN/WD NAD  HEENT: PERRLA, EOMI, moist mucous membranes  Neurology: A&Ox3, nonfocal, XIE x 4  Respiratory: CTA B/L, normal respiratory effort, no wheezes, crackles, rales  CV: RRR, S1S2, no murmurs, rubs or gallops  Abdominal: Soft, NT, ND +BS, Last BM  Extremities: No edema, + peripheral pulses  Incisions:   Tubes:    TELEMETRY:     EKG:     IMAGING:    LABS:  ABG - ( 2017 14:15 )  pH: 7.43  /  pCO2: 39    /  pO2: 58    / HCO3: 25    / Base Excess: 1.1   /  SaO2: 89                                      11.1   5.7   )-----------( 103      ( 2017 06:17 )             33.3     -    141  |  105  |  19  ----------------------------<  112<H>  4.3   |  28  |  0.56    Ca    9.0      2017 06:17  Phos  2.6       Mg     2.2         TPro  5.8<L>  /  Alb  3.2<L>  /  TBili  0.7  /  DBili  x   /  AST  13  /  ALT  11  /  AlkPhos  52      LIVER FUNCTIONS - ( 2017 14:16 )  Alb: 3.2 g/dL / Pro: 5.8 g/dL / ALK PHOS: 52 U/L / ALT: 11 U/L RC / AST: 13 U/L / GGT: x           PT/INR - ( 2017 06:17 )   PT: 16.6 sec;   INR: 1.51 ratio         PTT - ( 2017 14:16 )  PTT:30.3 sec PATIENT:  SANCHO DIAZ  [id number]    CHIEF COMPLAINT:  Patient is a 67y old  Male who presents with a chief complaint of "I passed out again" (2017 17:19)      INTERVAL HISTORY: No acute events overnight. States he will leave today no matter what.    REVIEW OF SYSTEMS:    CONSTITUTIONAL: No weakness, fevers or chills  EYES/ENT: No visual changes;  No vertigo or throat pain   NECK: No pain or stiffness  RESPIRATORY: No cough, wheezing, hemoptysis; No shortness of breath  CARDIOVASCULAR: No chest pain or palpitations  GASTROINTESTINAL: No abdominal or epigastric pain. No nausea, vomiting, or hematemesis; No diarrhea or constipation. No melena or hematochezia.  GENITOURINARY: No dysuria, frequency or hematuria  NEUROLOGICAL: No numbness or weakness, No dizziness or lightheadedness  SKIN: No itching, burning, rashes, or lesions   All other review of systems is negative unless indicated above.    MEDICATIONS  (STANDING):  heparin  Injectable 5000Unit(s) SubCutaneous every 8 hours  insulin lispro (HumaLOG) corrective regimen sliding scale  SubCutaneous three times a day before meals  insulin lispro (HumaLOG) corrective regimen sliding scale  SubCutaneous at bedtime  dextrose 50% Injectable 12.5Gram(s) IV Push once  dextrose 50% Injectable 25Gram(s) IV Push once  aspirin enteric coated 81milliGRAM(s) Oral daily  celecoxib 200milliGRAM(s) Oral daily  pregabalin 200milliGRAM(s) Oral two times a day  atorvastatin 40milliGRAM(s) Oral at bedtime  cholecalciferol 5000Unit(s) Oral daily  buDESOnide  80 MICROgram(s)/formoterol 4.5 MICROgram(s) Inhaler 2Puff(s) Inhalation two times a day  tiotropium 18 MICROgram(s) Capsule 1Capsule(s) Inhalation daily  loratadine 10milliGRAM(s) Oral daily    MEDICATIONS  (PRN):  acetaminophen   Tablet. 650milliGRAM(s) Oral every 6 hours PRN Mild Pain (1 - 3)  dextrose Gel 1Dose(s) Oral once PRN Blood Glucose LESS THAN 70 milliGRAM(s)/deciliter  glucagon  Injectable 1milliGRAM(s) IntraMuscular once PRN Glucose LESS THAN 70 milligrams/deciliter  HYDROmorphone   Tablet 2milliGRAM(s) Oral every 4 hours PRN Moderate Pain (4 - 6)  hydrocortisone 1% Ointment 1Application(s) Topical daily PRN Rash and/or Itching      OBJECTIVE:  ICU Vital Signs Last 24 Hrs  T(C): 36.5, Max: 36.7 (- @ 23:00)  T(F): 97.7, Max: 98 ( @ 23:00)  HR: 60 (58 - 88)  BP: 126/65 (107/71 - 148/72)  BP(mean): 90 (79 - 103)  ABP: --  ABP(mean): --  RR: 15 (9 - 30)  SpO2: 96% (94% - 98%)      Adult Advanced Hemodynamics Last 24 Hrs  CVP(mm Hg): --  CVP(cm H2O): --  CO: --  CI: --  PA: --  PA(mean): --  PCWP: --  SVR: --  SVRI: --  PVR: --  PVRI: --  CAPILLARY BLOOD GLUCOSE  194 (2017 21:20)  109 (2017 17:19)  198 (2017 12:00)    CAPILLARY BLOOD GLUCOSE  194 (2017 21:20)      I&O's Summary    I & Os for current day (as of 2017 07:33)  =============================================  IN: 480 ml / OUT: 1450 ml / NET: -970 ml    Daily     Daily Weight in k.1 (2017 06:00)    General: WN/WD NAD  HEENT: PERRLA, EOMI, moist mucous membranes  Neurology: A&Ox3, nonfocal, XIE x 4  Respiratory: CTA B/L, normal respiratory effort, no wheezes, crackles, rales  CV: RRR, S1S2 *  Abdominal: Soft, NT, ND +BS, Last BM  Extremities: No edema, + peripheral pulses, *      TELEMETRY:     EKG:     IMAGING:    LABS:  ABG - ( 2017 14:15 )  pH: 7.43  /  pCO2: 39    /  pO2: 58    / HCO3: 25    / Base Excess: 1.1   /  SaO2: 89                                      11.1   5.7   )-----------( 103      ( 2017 06:17 )             33.3     -    141  |  105  |  19  ----------------------------<  112<H>  4.3   |  28  |  0.56    Ca    9.0      2017 06:17  Phos  2.6       Mg     2.2         TPro  5.8<L>  /  Alb  3.2<L>  /  TBili  0.7  /  DBili  x   /  AST  13  /  ALT  11  /  AlkPhos  52      LIVER FUNCTIONS - ( 2017 14:16 )  Alb: 3.2 g/dL / Pro: 5.8 g/dL / ALK PHOS: 52 U/L / ALT: 11 U/L RC / AST: 13 U/L / GGT: x           PT/INR - ( 2017 06:17 )   PT: 16.6 sec;   INR: 1.51 ratio         PTT - ( 2017 14:16 )  PTT:30.3 sec PATIENT:  SANCHO DIAZ  [id number]    CHIEF COMPLAINT:  Patient is a 67y old  Male who presents with a chief complaint of "I passed out again" (2017 17:19)      INTERVAL HISTORY: No acute events overnight. States he will leave today no matter what.    REVIEW OF SYSTEMS:    CONSTITUTIONAL: No weakness, fevers or chills  EYES/ENT: No visual changes;  No vertigo or throat pain   NECK: No pain or stiffness  RESPIRATORY: No cough, wheezing, hemoptysis; No shortness of breath  CARDIOVASCULAR: No chest pain or palpitations  GASTROINTESTINAL: No abdominal or epigastric pain. No nausea, vomiting, or hematemesis; No diarrhea or constipation. No melena or hematochezia.  GENITOURINARY: No dysuria, frequency or hematuria  NEUROLOGICAL: No numbness or weakness, No dizziness or lightheadedness  SKIN: No itching, burning, rashes, or lesions   All other review of systems is negative unless indicated above.    MEDICATIONS  (STANDING):  heparin  Injectable 5000Unit(s) SubCutaneous every 8 hours  insulin lispro (HumaLOG) corrective regimen sliding scale  SubCutaneous three times a day before meals  insulin lispro (HumaLOG) corrective regimen sliding scale  SubCutaneous at bedtime  dextrose 50% Injectable 12.5Gram(s) IV Push once  dextrose 50% Injectable 25Gram(s) IV Push once  aspirin enteric coated 81milliGRAM(s) Oral daily  celecoxib 200milliGRAM(s) Oral daily  pregabalin 200milliGRAM(s) Oral two times a day  atorvastatin 40milliGRAM(s) Oral at bedtime  cholecalciferol 5000Unit(s) Oral daily  buDESOnide  80 MICROgram(s)/formoterol 4.5 MICROgram(s) Inhaler 2Puff(s) Inhalation two times a day  tiotropium 18 MICROgram(s) Capsule 1Capsule(s) Inhalation daily  loratadine 10milliGRAM(s) Oral daily    MEDICATIONS  (PRN):  acetaminophen   Tablet. 650milliGRAM(s) Oral every 6 hours PRN Mild Pain (1 - 3)  dextrose Gel 1Dose(s) Oral once PRN Blood Glucose LESS THAN 70 milliGRAM(s)/deciliter  glucagon  Injectable 1milliGRAM(s) IntraMuscular once PRN Glucose LESS THAN 70 milligrams/deciliter  HYDROmorphone   Tablet 2milliGRAM(s) Oral every 4 hours PRN Moderate Pain (4 - 6)  hydrocortisone 1% Ointment 1Application(s) Topical daily PRN Rash and/or Itching      OBJECTIVE:  ICU Vital Signs Last 24 Hrs  T(C): 36.5, Max: 36.7 (- @ 23:00)  T(F): 97.7, Max: 98 ( @ 23:00)  HR: 60 (58 - 88)  BP: 126/65 (107/71 - 148/72)  BP(mean): 90 (79 - 103)  ABP: --  ABP(mean): --  RR: 15 (9 - 30)  SpO2: 96% (94% - 98%)        I&O's Summary    I & Os for current day (as of 2017 07:33)  =============================================  IN: 480 ml / OUT: 1450 ml / NET: -970 ml    Daily     Daily Weight in k.1 (2017 06:00)    General: WN/WD NAD  HEENT: PERRLA, EOMI, moist mucous membranes  Neurology: A&Ox3, nonfocal, XIE x 4  Respiratory: CTA B/L, normal respiratory effort, no wheezes, crackles, rales  CV: RRR, S1S2 *  Abdominal: Soft, NT, ND +BS, Last BM  Extremities: No edema, + peripheral pulses, *      TELEMETRY:     EKG:     IMAGING:    LABS:  ABG - ( 2017 14:15 )  pH: 7.43  /  pCO2: 39    /  pO2: 58    / HCO3: 25    / Base Excess: 1.1   /  SaO2: 89                                      11.1   5.7   )-----------( 103      ( 2017 06:17 )             33.3     06-25    141  |  105  |  19  ----------------------------<  112<H>  4.3   |  28  |  0.56    Ca    9.0      2017 06:17  Phos  2.6     06-25  Mg     2.2     06-25    TPro  5.8<L>  /  Alb  3.2<L>  /  TBili  0.7  /  DBili  x   /  AST  13  /  ALT  11  /  AlkPhos  52  06-23    LIVER FUNCTIONS - ( 2017 14:16 )  Alb: 3.2 g/dL / Pro: 5.8 g/dL / ALK PHOS: 52 U/L / ALT: 11 U/L RC / AST: 13 U/L / GGT: x           PT/INR - ( 2017 06:17 )   PT: 16.6 sec;   INR: 1.51 ratio         PTT - ( 2017 14:16 )  PTT:30.3 sec PATIENT:  SANCHO DIAZ  [id number]    CHIEF COMPLAINT:  Patient is a 67y old  Male who presents with a chief complaint of "I passed out again" (2017 17:19)      INTERVAL HISTORY: No acute events overnight. States he will leave today no matter what.    REVIEW OF SYSTEMS:    CONSTITUTIONAL: No weakness, fevers or chills  EYES/ENT: No visual changes;  No vertigo or throat pain   NECK: No pain or stiffness  RESPIRATORY: No cough, wheezing, hemoptysis; No shortness of breath  CARDIOVASCULAR: No chest pain or palpitations  GASTROINTESTINAL: No abdominal or epigastric pain. No nausea, vomiting, or hematemesis; No diarrhea or constipation. No melena or hematochezia.  GENITOURINARY: No dysuria, frequency or hematuria  NEUROLOGICAL: No numbness or weakness, No dizziness or lightheadedness  SKIN: No itching, burning, rashes, or lesions   All other review of systems is negative unless indicated above.    MEDICATIONS  (STANDING):  heparin  Injectable 5000Unit(s) SubCutaneous every 8 hours  insulin lispro (HumaLOG) corrective regimen sliding scale  SubCutaneous three times a day before meals  insulin lispro (HumaLOG) corrective regimen sliding scale  SubCutaneous at bedtime  dextrose 50% Injectable 12.5Gram(s) IV Push once  dextrose 50% Injectable 25Gram(s) IV Push once  aspirin enteric coated 81milliGRAM(s) Oral daily  celecoxib 200milliGRAM(s) Oral daily  pregabalin 200milliGRAM(s) Oral two times a day  atorvastatin 40milliGRAM(s) Oral at bedtime  cholecalciferol 5000Unit(s) Oral daily  buDESOnide  80 MICROgram(s)/formoterol 4.5 MICROgram(s) Inhaler 2Puff(s) Inhalation two times a day  tiotropium 18 MICROgram(s) Capsule 1Capsule(s) Inhalation daily  loratadine 10milliGRAM(s) Oral daily    MEDICATIONS  (PRN):  acetaminophen   Tablet. 650milliGRAM(s) Oral every 6 hours PRN Mild Pain (1 - 3)  dextrose Gel 1Dose(s) Oral once PRN Blood Glucose LESS THAN 70 milliGRAM(s)/deciliter  glucagon  Injectable 1milliGRAM(s) IntraMuscular once PRN Glucose LESS THAN 70 milligrams/deciliter  HYDROmorphone   Tablet 2milliGRAM(s) Oral every 4 hours PRN Moderate Pain (4 - 6)  hydrocortisone 1% Ointment 1Application(s) Topical daily PRN Rash and/or Itching      OBJECTIVE:  ICU Vital Signs Last 24 Hrs  T(C): 36.5, Max: 36.7 (- @ 23:00)  T(F): 97.7, Max: 98 ( @ 23:00)  HR: 60 (58 - 88)  BP: 126/65 (107/71 - 148/72)  BP(mean): 90 (79 - 103)  ABP: --  ABP(mean): --  RR: 15 (9 - 30)  SpO2: 96% (94% - 98%)        I&O's Summary    I & Os for current day (as of 2017 07:33)  =============================================  IN: 480 ml / OUT: 1450 ml / NET: -970 ml    Daily     Daily Weight in k.1 (2017 06:00)    General: WN/WD NAD  HEENT: PERRLA, EOMI, moist mucous membranes  Neurology: A&Ox3, nonfocal, XIE x 4  Respiratory: CTA B/L, normal respiratory effort, no wheezes, crackles, rales  CV: RRR, S1S2, no murmurs  Abdominal: Soft, NT, ND +BS, Last BM  Extremities: minimal edema at ankles, + peripheral pulses    TELEMETRY:     EKG:     IMAGING:    LABS:  ABG - ( 2017 14:15 )  pH: 7.43  /  pCO2: 39    /  pO2: 58    / HCO3: 25    / Base Excess: 1.1   /  SaO2: 89                                      11.1   5.7   )-----------( 103      ( 2017 06:17 )             33.3     06-25    141  |  105  |  19  ----------------------------<  112<H>  4.3   |  28  |  0.56    Ca    9.0      2017 06:17  Phos  2.6     06-25  Mg     2.2     06-25    TPro  5.8<L>  /  Alb  3.2<L>  /  TBili  0.7  /  DBili  x   /  AST  13  /  ALT  11  /  AlkPhos  52  06-23    LIVER FUNCTIONS - ( 2017 14:16 )  Alb: 3.2 g/dL / Pro: 5.8 g/dL / ALK PHOS: 52 U/L / ALT: 11 U/L RC / AST: 13 U/L / GGT: x           PT/INR - ( 2017 06:17 )   PT: 16.6 sec;   INR: 1.51 ratio         PTT - ( 2017 14:16 )  PTT:30.3 sec

## 2017-06-25 NOTE — DISCHARGE NOTE ADULT - NS AS ACTIVITY OBS
Limit ativity of left arm.  Do not raise above shoulder Limit activity of left arm.  Do not raise above shoulder

## 2017-06-25 NOTE — DISCHARGE NOTE ADULT - OTHER SIGNIFICANT FINDINGS
Patient name: SANCHO DIAZ  YOB: 1950   Age: 67 (M)   MR#: [id number]  Study Date: 6/24/2017  [id number]: Shannan Smith CHARU  Study quality: Technically difficult  Referring Physician: Glenn Daniels MD  Blood Pressure: 139/69 mmHg  Height: 191 cm  Weight: 93 kg  BSA: 2.2 m2  ------------------------------------------------------------------------  PROCEDURE: Limited transthoracic echocardiogram with 2-D.  M-Mode and spectral and color flow Doppler.  INDICATION: Pericardial effusion (noninflammatory) (I31.3)  ------------------------------------------------------------------------  OBSERVATIONS:  Pericardium/PleuraNo pericardial effusion seen.  ------------------------------------------------------------------------  CONCLUSIONS:  1. No pericardial effusion seen.  ------------------------------------------------------------------------  Confirmed on  6/24/2017 - 12:35:49 by Leobardo Schuster M.D.

## 2017-06-25 NOTE — DISCHARGE NOTE ADULT - MEDICATION SUMMARY - MEDICATIONS TO STOP TAKING
I will STOP taking the medications listed below when I get home from the hospital:    ramipril 10 mg oral capsule  -- 1 cap(s) by mouth once a day

## 2017-06-25 NOTE — DISCHARGE NOTE ADULT - CARE PLAN
Principal Discharge DX:	Syncope  Goal:	Stop fainting spells  Instructions for follow-up, activity and diet:	You were admitted to the [facility] because you fainted. Your EKG showed changes so your cardiologist thought it was helpful for you to have a pacemaker. You were admitted to the CCU after placement of the pacemaker because your blood pressure was low. You were monitored in the CCU until your blood pressure was normal. Please follow up with your cardiologist within 1 week of discharge. If you experience any lightheadedness, dizziness, chest pain, shortness of breath please return to the hospital. Please keep the incision sight from the pacemaker covered in the shower for 1 week. Do not ride your motorcycle. You can drive local distances for appointments. Do not lift your left hand above the level of your shoulder.  Secondary Diagnosis:	Multiple myeloma, remission status unspecified  Instructions for follow-up, activity and diet:	Please continue to take your home medication as prescribed.  Secondary Diagnosis:	Other pulmonary embolism without acute cor pulmonale, unspecified chronicity  Instructions for follow-up, activity and diet:	Please continue to take your home dosages of coumadin. Please get your INR checked by your health care provider within 2 days.  Secondary Diagnosis:	Type 2 diabetes mellitus without complication, with long-term current use of insulin  Instructions for follow-up, activity and diet:	Continue your home medications and a low carbohydrate diet.  Secondary Diagnosis:	Essential hypertension  Instructions for follow-up, activity and diet:	We stopped your ramipril medication while you were in the hospital. Please follow up with your cardiologist to assess your blood pressure and when to restart this medication.  Secondary Diagnosis:	Chronic obstructive pulmonary disease, unspecified COPD type  Instructions for follow-up, activity and diet:	Continue your home medications.  Secondary Diagnosis:	Coronary artery disease involving native coronary artery of native heart, angina presence unspecified  Instructions for follow-up, activity and diet:	Continue your home medications. Principal Discharge DX:	Syncope  Goal:	Stop fainting spells  Instructions for follow-up, activity and diet:	You were admitted to the hospitals because you fainted. Your EKG showed changes so your cardiologist thought it was helpful for you to have a pacemaker. You were admitted to the CCU after placement of the pacemaker because your blood pressure was low. You were monitored in the CCU until your blood pressure was normal. Please follow up with your cardiologist within 1 week of discharge. If you experience any lightheadedness, dizziness, chest pain, shortness of breath please return to the hospital. Please keep the incision sight from the pacemaker covered in the shower for 1 week. Do not ride your motorcycle. You can drive local distances for appointments. Do not lift your left hand above the level of your shoulder.  Secondary Diagnosis:	Multiple myeloma, remission status unspecified  Instructions for follow-up, activity and diet:	Please continue to take your home medication as prescribed.  Secondary Diagnosis:	Other pulmonary embolism without acute cor pulmonale, unspecified chronicity  Instructions for follow-up, activity and diet:	Please continue to take your home dosages of coumadin. Please get your INR checked by your health care provider within 2 days.  Secondary Diagnosis:	Type 2 diabetes mellitus without complication, with long-term current use of insulin  Instructions for follow-up, activity and diet:	Continue your home medications and a low carbohydrate diet.  Secondary Diagnosis:	Essential hypertension  Instructions for follow-up, activity and diet:	We stopped your ramipril medication while you were in the hospital. Please follow up with your cardiologist to assess your blood pressure and when to restart this medication.  Secondary Diagnosis:	Chronic obstructive pulmonary disease, unspecified COPD type  Instructions for follow-up, activity and diet:	Continue your home medications.  Secondary Diagnosis:	Coronary artery disease involving native coronary artery of native heart, angina presence unspecified  Instructions for follow-up, activity and diet:	Continue your home medications.

## 2017-06-25 NOTE — DISCHARGE NOTE ADULT - CARE PROVIDERS DIRECT ADDRESSES
[email or web address]

## 2017-06-28 LAB
CULTURE RESULTS: SIGNIFICANT CHANGE UP
CULTURE RESULTS: SIGNIFICANT CHANGE UP
SPECIMEN SOURCE: SIGNIFICANT CHANGE UP
SPECIMEN SOURCE: SIGNIFICANT CHANGE UP

## 2017-07-05 ENCOUNTER — APPOINTMENT (OUTPATIENT)
Dept: ELECTROPHYSIOLOGY | Facility: CLINIC | Age: 67
End: 2017-07-05

## 2017-07-05 ENCOUNTER — NON-APPOINTMENT (OUTPATIENT)
Age: 67
End: 2017-07-05

## 2017-07-05 VITALS — SYSTOLIC BLOOD PRESSURE: 136 MMHG | HEART RATE: 71 BPM | DIASTOLIC BLOOD PRESSURE: 71 MMHG | OXYGEN SATURATION: 97 %

## 2017-07-19 PROBLEM — Z95.2 S/P TAVR (TRANSCATHETER AORTIC VALVE REPLACEMENT): Status: ACTIVE | Noted: 2017-06-22

## 2017-07-19 PROBLEM — I10 HYPERTENSION: Status: ACTIVE | Noted: 2017-02-28

## 2017-07-19 RX ORDER — RAMIPRIL 10 MG/1
10 CAPSULE ORAL
Qty: 30 | Refills: 1 | Status: ACTIVE | COMMUNITY
Start: 2017-04-14

## 2017-10-05 ENCOUNTER — APPOINTMENT (OUTPATIENT)
Dept: ELECTROPHYSIOLOGY | Facility: CLINIC | Age: 67
End: 2017-10-05

## 2017-10-12 ENCOUNTER — APPOINTMENT (OUTPATIENT)
Dept: ELECTROPHYSIOLOGY | Facility: CLINIC | Age: 67
End: 2017-10-12
Payer: MEDICARE

## 2017-10-12 VITALS
SYSTOLIC BLOOD PRESSURE: 130 MMHG | DIASTOLIC BLOOD PRESSURE: 72 MMHG | HEART RATE: 59 BPM | BODY MASS INDEX: 28.12 KG/M2 | OXYGEN SATURATION: 98 % | WEIGHT: 225 LBS

## 2017-10-12 PROCEDURE — 93280 PM DEVICE PROGR EVAL DUAL: CPT

## 2018-01-29 ENCOUNTER — APPOINTMENT (OUTPATIENT)
Dept: ELECTROPHYSIOLOGY | Facility: CLINIC | Age: 68
End: 2018-01-29

## 2018-04-11 NOTE — PATIENT PROFILE ADULT. - SPIRITUAL CULTURAL, RELIGIOUS PRACTICES/VALUES, PROFILE
Patient was admitted to floor in improved and stable condition via cart by BEKA Bah; belongings sent with patient.     Nondenominational

## 2018-04-30 ENCOUNTER — APPOINTMENT (OUTPATIENT)
Dept: ELECTROPHYSIOLOGY | Facility: CLINIC | Age: 68
End: 2018-04-30

## 2018-05-30 NOTE — DISCHARGE NOTE ADULT - PLAN OF CARE
(0) understands/communicates without difficulty Stop fainting spells You were admitted to the [facility]opt[facility] because you fainted. Your EKG showed changes so your cardiologist thought it was helpful for you to have a pacemaker. You were admitted to the CCU after placement of the pacemaker because your blood pressure was low. You were monitored in the CCU until your blood pressure was normal. Please follow up with your cardiologist within 1 week of discharge. If you experience any lightheadedness, dizziness, chest pain, shortness of breath please return to the hospital. Please keep the incision sight from the pacemaker covered in the shower for 1 week. Do not ride your motorcycle. You can drive local distances for appointments. Do not lift your left hand above the level of your shoulder. Please continue to take your home medication as prescribed. Please continue to take your home dosages of coumadin. Please get your INR checked by your health care provider within 2 days. Continue your home medications and a low carbohydrate diet. We stopped your ramipril medication while you were in the hospital. Please follow up with your cardiologist to assess your blood pressure and when to restart this medication. Continue your home medications.

## 2018-07-16 PROBLEM — I50.9 HEART FAILURE, UNSPECIFIED: Chronic | Status: INACTIVE | Noted: 2017-03-02 | Resolved: 2017-06-22

## 2018-07-16 PROBLEM — I26.99 OTHER PULMONARY EMBOLISM WITHOUT ACUTE COR PULMONALE: Chronic | Status: ACTIVE | Noted: 2017-03-02

## 2018-07-16 PROBLEM — I35.1 NONRHEUMATIC AORTIC (VALVE) INSUFFICIENCY: Chronic | Status: INACTIVE | Noted: 2017-03-02 | Resolved: 2017-06-22

## 2018-07-16 PROBLEM — I25.10 ATHEROSCLEROTIC HEART DISEASE OF NATIVE CORONARY ARTERY WITHOUT ANGINA PECTORIS: Chronic | Status: ACTIVE | Noted: 2017-03-02

## 2018-07-16 PROBLEM — C90.00 MULTIPLE MYELOMA NOT HAVING ACHIEVED REMISSION: Chronic | Status: ACTIVE | Noted: 2017-03-02

## 2018-07-16 PROBLEM — I34.0 NONRHEUMATIC MITRAL (VALVE) INSUFFICIENCY: Chronic | Status: INACTIVE | Noted: 2017-03-02 | Resolved: 2017-06-22

## 2018-07-16 PROBLEM — I82.409 ACUTE EMBOLISM AND THROMBOSIS OF UNSPECIFIED DEEP VEINS OF UNSPECIFIED LOWER EXTREMITY: Chronic | Status: INACTIVE | Noted: 2017-03-02 | Resolved: 2017-06-22

## 2018-07-16 PROBLEM — F41.9 ANXIETY DISORDER, UNSPECIFIED: Chronic | Status: INACTIVE | Noted: 2017-03-02 | Resolved: 2017-06-22

## 2018-07-16 PROBLEM — J44.9 CHRONIC OBSTRUCTIVE PULMONARY DISEASE, UNSPECIFIED: Chronic | Status: ACTIVE | Noted: 2017-03-02

## 2018-07-16 PROBLEM — Z86.73 PERSONAL HISTORY OF TRANSIENT ISCHEMIC ATTACK (TIA), AND CEREBRAL INFARCTION WITHOUT RESIDUAL DEFICITS: Chronic | Status: ACTIVE | Noted: 2017-03-02

## 2018-07-16 PROBLEM — C18.2 MALIGNANT NEOPLASM OF ASCENDING COLON: Chronic | Status: ACTIVE | Noted: 2017-03-02

## 2018-08-02 ENCOUNTER — APPOINTMENT (OUTPATIENT)
Dept: ELECTROPHYSIOLOGY | Facility: CLINIC | Age: 68
End: 2018-08-02
Payer: MEDICARE

## 2018-08-02 PROCEDURE — XXXXX: CPT

## 2018-12-07 ENCOUNTER — OTHER (OUTPATIENT)
Age: 68
End: 2018-12-07

## 2018-12-17 ENCOUNTER — APPOINTMENT (OUTPATIENT)
Dept: ELECTROPHYSIOLOGY | Facility: CLINIC | Age: 68
End: 2018-12-17
Payer: MEDICARE

## 2018-12-17 DIAGNOSIS — R55 SYNCOPE AND COLLAPSE: ICD-10-CM

## 2018-12-17 PROCEDURE — 93296 REM INTERROG EVL PM/IDS: CPT

## 2018-12-17 PROCEDURE — 93294 REM INTERROG EVL PM/LDLS PM: CPT

## 2018-12-26 PROBLEM — R55 SYNCOPE: Status: ACTIVE | Noted: 2017-04-14

## 2018-12-27 PROBLEM — I10 ESSENTIAL (PRIMARY) HYPERTENSION: Chronic | Status: ACTIVE | Noted: 2017-03-02

## 2018-12-27 PROBLEM — E11.9 TYPE 2 DIABETES MELLITUS WITHOUT COMPLICATIONS: Chronic | Status: ACTIVE | Noted: 2017-03-02

## 2018-12-27 PROBLEM — E78.5 HYPERLIPIDEMIA, UNSPECIFIED: Chronic | Status: ACTIVE | Noted: 2017-03-02

## 2019-02-19 ENCOUNTER — APPOINTMENT (OUTPATIENT)
Dept: ELECTROPHYSIOLOGY | Facility: CLINIC | Age: 69
End: 2019-02-19
Payer: MEDICARE

## 2019-02-19 PROCEDURE — XXXXX: CPT

## 2021-10-11 NOTE — H&P ADULT. - GASTROINTESTINAL
negative Soft, non-tender, no hepatosplenomegaly, normal bowel sounds Zyclara Pregnancy And Lactation Text: This medication is Pregnancy Category C. It is unknown if this medication is excreted in breast milk.

## 2022-07-21 NOTE — H&P ADULT - NSHPRISKHEPCSCREEN_GEN_A_CORE
Patient free from falls and injuries throughout shift.  AAO X 4 and VSS. Continues on Insulin gtt 1.5 units/hr. Chest tube output 50cc for shift. Pt refuses to move without PT present. Unable to obtain weight this AM. Weaned to 4L NC sats 94%. Patient denies chest pain and SOB. No acute events overnight. Patient resting well at this time.  Plan of care discussed with patient.  Patient verbalizes understanding.  Will continue to monitor.      Offered and patient declined

## 2023-08-27 NOTE — PROGRESS NOTE ADULT - I WAS PHYSICALLY PRESENT FOR THE KEY PORTIONS OF THE EVALUATION AND MANAGEMENT (E/M) SERVICE PROVIDED.  I AGREE WITH THE ABOVE HISTORY, PHYSICAL, AND PLAN WHICH I HAVE REVIEWED AND EDITED WHERE APPROPRIATE
Problem: At Risk for Falls  Goal: # Patient does not fall  Outcome: Outcome Met, Continue evaluating goal progress toward completion  Goal: # Takes action to control fall-related risks  Outcome: Outcome Met, Continue evaluating goal progress toward completion     Problem: Pressure Injury, Risk for  Goal: # Skin remains intact  Outcome: Outcome Met, Continue evaluating goal progress toward completion     Problem: Pain  Goal: #Acceptable pain level achieved/maintained at rest using NRS/Faces  Description: This goal is used for patients who can self-report.  Acceptable means the level is at or below the identified comfort/function goal.  Outcome: Outcome Met, Continue evaluating goal progress toward completion     
Statement Selected
Statement Selected

## 2024-09-24 NOTE — ED PROVIDER NOTE - NS ED ATTENDING STATEMENT MOD
I have personally seen and examined this patient.  I have fully participated in the care of this patient. I have reviewed all pertinent clinical information, including history, physical exam, plan and the Resident’s note and agree except as noted.
Unknown

## 2025-04-04 NOTE — H&P ADULT. - GENITOURINARY
April 4, 2025    Hello, may I speak with Shane Hood?    My name is DERRICK      I am  with [facility]  [address]  [facility] [address] [phone].    Before we get started may I verify your date of birth? 1966    I am calling to officially welcome you to our practice and ask about your recent visit. Is this a good time to talk? yes    Tell me about your visit with us. What things went well?  ALL WAS GREAT, NO COMPLAINTS       We're always looking for ways to make our patients' experiences even better. Do you have recommendations on ways we may improve?  no    Overall were you satisfied with your first visit to our practice? yes       I appreciate you taking the time to speak with me today. Is there anything else I can do for you? no      Thank you, and have a great day.      
negative

## 2025-04-28 NOTE — DIETITIAN INITIAL EVALUATION ADULT. - ORAL INTAKE PTA
04/28/25                            Francesca Ochoa  [address] [address]    To Whom It May Concern:    This is to certify Frnacesca Ochoa was evaluated with JENA Gomez on 04/28/25 and is excused from school               Electronically signed by:  JENA Gomez  Formerly named [facility]  [address] [facility] [address]  Dept Phone: [phone]       
good/Breakfast- toast/english muffin/scooped out bagel with butter or sugar-free cereal with milk; Lunch- tuna fish/cold cuts (ham, turkey, salami); Dinner- beef/pork/chicken/fish starch with salad/fish. Drinks diet soda, diet ice  tea. Snacks on diet/sugar-free products. Wife does cook with salt and sometimes Pt adds salt to foods.